# Patient Record
Sex: FEMALE | Race: WHITE | NOT HISPANIC OR LATINO | Employment: FULL TIME | URBAN - METROPOLITAN AREA
[De-identification: names, ages, dates, MRNs, and addresses within clinical notes are randomized per-mention and may not be internally consistent; named-entity substitution may affect disease eponyms.]

---

## 2017-02-02 ENCOUNTER — TELEPHONE (OUTPATIENT)
Dept: OBGYN | Facility: CLINIC | Age: 32
End: 2017-02-02

## 2017-02-02 NOTE — TELEPHONE ENCOUNTER
[facility] Call Center    Phone Message    Name of Caller: Clarissa    Phone Number: Work number on file:  [phone] (work)    Best time to return call: any    May a detailed message be left on voicemail: yes    Relation to patient: Self    Reason for Call: Form or Letter   Type or form/letter needing completion: Referral for Infertility management needs to be faxed to the patients insurance [employer]   Provider: Dr. Brush   Date form needed: ASAP  Once completed: Fax form to: [phone]      Action Taken: Message routed to:  [facility] p [phone]

## 2017-04-21 ENCOUNTER — TELEPHONE (OUTPATIENT)
Dept: OBGYN | Facility: CLINIC | Age: 32
End: 2017-04-21

## 2017-04-21 ENCOUNTER — TRANSFERRED RECORDS (OUTPATIENT)
Dept: HEALTH INFORMATION MANAGEMENT | Facility: CLINIC | Age: 32
End: 2017-04-21

## 2017-04-21 NOTE — TELEPHONE ENCOUNTER
[facility] Call Center    Phone Message    Name of Caller: Clarissa    Phone Number: Cell number on file:    Telephone Information:   Mobile [phone]       Best time to return call: any    May a detailed message be left on voicemail: yes    Relation to patient: Self    Reason for Call: Other: Patient is calling requesting her Infertility management referral be sent to [facility] in [place]. Please advise.     Action Taken: Message routed to:  [facility] p [phone]

## 2017-04-24 NOTE — TELEPHONE ENCOUNTER
Patient is requesting a referral to [facility]. Told patient a referral was faxed to insurance company on 2/2/17. She stated she will contact her insurance to see if it specifically needs to go to [facility] since one was placed.    Also, will come in to clinic and sign a SHOAIB for records.    Gena Kapoor CMA

## 2017-04-24 NOTE — TELEPHONE ENCOUNTER
Gena Kapoor contacted Clarissa on 04/24/17 and left a message. If patient calls back please inform patient that she will need to sign a SHOAIB and then we can release requested lab, office notes, etc to patient or [facility].    Gena Kapoor, SONY

## 2017-05-10 ENCOUNTER — OFFICE VISIT (OUTPATIENT)
Dept: SURGERY | Facility: CLINIC | Age: 32
End: 2017-05-10
Payer: COMMERCIAL

## 2017-05-10 VITALS
RESPIRATION RATE: 18 BRPM | HEART RATE: 74 BPM | BODY MASS INDEX: 45.99 KG/M2 | WEIGHT: 293 LBS | HEIGHT: 67 IN | SYSTOLIC BLOOD PRESSURE: 126 MMHG | OXYGEN SATURATION: 97 % | DIASTOLIC BLOOD PRESSURE: 83 MMHG | TEMPERATURE: 97 F

## 2017-05-10 DIAGNOSIS — R60.0 BILATERAL EDEMA OF LOWER EXTREMITY: ICD-10-CM

## 2017-05-10 DIAGNOSIS — N39.3 STRESS INCONTINENCE: ICD-10-CM

## 2017-05-10 DIAGNOSIS — E66.812 OBESITY, CLASS II, BMI 35-39.9: ICD-10-CM

## 2017-05-10 DIAGNOSIS — Z13.228 SCREENING FOR ENDOCRINE, NUTRITIONAL, METABOLIC AND IMMUNITY DISORDER: ICD-10-CM

## 2017-05-10 DIAGNOSIS — Z13.29 SCREENING FOR ENDOCRINE, NUTRITIONAL, METABOLIC AND IMMUNITY DISORDER: ICD-10-CM

## 2017-05-10 DIAGNOSIS — Z13.21 SCREENING FOR ENDOCRINE, NUTRITIONAL, METABOLIC AND IMMUNITY DISORDER: ICD-10-CM

## 2017-05-10 DIAGNOSIS — Z87.42 HISTORY OF INFERTILITY: ICD-10-CM

## 2017-05-10 DIAGNOSIS — E66.01 MORBID OBESITY WITH BODY MASS INDEX OF 40.0-44.9 IN ADULT (H): Primary | ICD-10-CM

## 2017-05-10 DIAGNOSIS — Z13.0 SCREENING FOR ENDOCRINE, NUTRITIONAL, METABOLIC AND IMMUNITY DISORDER: ICD-10-CM

## 2017-05-10 DIAGNOSIS — Z13.0 SCREENING FOR IRON DEFICIENCY ANEMIA: ICD-10-CM

## 2017-05-10 PROCEDURE — 97802 MEDICAL NUTRITION INDIV IN: CPT | Performed by: DIETITIAN, REGISTERED

## 2017-05-10 PROCEDURE — 99205 OFFICE O/P NEW HI 60 MIN: CPT | Performed by: PHYSICIAN ASSISTANT

## 2017-05-10 NOTE — PROGRESS NOTES
INITIAL BARIATRIC EVALUATION    [address]  [place] [address]  Phone [phone] Fax [phone]    Date: 5/10/2017    RE: OLEGARIO SESAY  MR#: [id number]  : 1985    Dear Kathleen Martin MD,   I had the pleasure of seeing your patient, OLEGARIO SESAY, to evaluate her obesity and consider her for possible weight loss surgery. As you know, OLEGARIO is 31 years old. She has been overweight since puberty. She has been morbidly obese for the last 5 years and has been unable to achieve long-term success with weight loss attempts, such as: Optifast and Weight Watchers  Comorbidities of obesity from her past medical history include: Infertility, Low back pain.  The symptoms related to her obesity include Irregular menstrual cycles, heartburn, Stress Incontinence, Knee pain, Back pain, Swelling of legs, Hip pain, Loud snoring, Shortness of Breath with activity, Varicose veins, Leg swelling. The following ADLs are hindered due to her weight: Lower body dressing.  Non-Obesity Related Past Medical History:  Anxiety - recently restarted medication and is helping  Past Surgical History:  elbow surgery, wisdom teeth extraction  Family History:  Mother- Blood clots,(Mother at age of 55 had 1 DVT believed from oral BCP) pulmonary embolism (PE), DVT;High blood pressure;Obese  PGF- Cancer: Cancer type: prostate  MGF- Diabetes  MGM- Cancer: Cancer type: Pancreatic  Sibling 1- None  Sibling 2- None  Social History:  She is here with [employer].  She works as a pharmacist  Ethnicity: white/  ETOH: Never  Illicit Drug Use: None  Tobacco Use: No    Support system: my fiance, my father will be available to help the patient in the early post op period. my whole family and my fiance and friends is who the patient can count on for support throughout her weight loss journey.  Can you afford three meals per day? Yes  ?Can you afford the $50-60 per month for vitamins? Yes  A 14 point review of system shows:  Female:  Irregular menstrual cycles,  Gastrointestinal: Reflux / heartburn, - occasional tums  Genitourinary: Stress Incontinence,  Musculoskeletal: Back pain, Hip pain, Knee pain, Swelling of legs,  Psychological: Anxiety,  Pulmonary: Shortness of Breath with activity, Snoring,  Skin: Varicose veins , Leg swelling    Vital Signs: Ht: 67 in, Wt: 298.4 lbs., BMI: 46.7, BP: 126/83 , Pulse: 74, RR: 18, Temp: 97.6    PHYSICAL EXAMINATION:  GENERAL: Alert and oriented x3. NAD  HEENT: Normocephalic, atraumatic, EOMI, Oral dentition adequate for chewing  CARDIOVASCULAR: Regular rate and rhythm, No murmurs, rubs or gallops  RESPIRATORY: Lung sounds clear to auscultation bilaterally  GASTROINTESTINAL: Soft, Obese, Nontender  LOWER EXTREMITIES: No edema  MUSCULOSKELETAL: Examination gait was normal  NEUROLOGIC: Alert and oriented x 3, no gross defect  SKIN: dry, warm to touch    In summary, OLEGARIO is morbidly obese with a body mass index of 46.7 kg/m2 and the comorbidities stated above. She attended an informational seminar and is a candidate for Laparoscopic Gastric Sleeve. She will have to complete the following pre-requisites:  Received weight loss goal of 5 lb prior to surgery.  Attend a bariatric support group  Achieve clearance from dietitian to see surgeon.  Establish care with a primary care provider.  Have preoperative laboratory tests drawn.  Psychological Evaluation with MMPI and clearance for weight loss surgery.     Today in the office we discussed gastric sleeve surgery. Preoperative, perioperative, and postoperative processes, management, and follow up were addressed. Risks and benefits were outlined including the risk of death, PE, DVT, ulcer, worsening GERD, N/V, stricture, hernia, wound infection, and vitamin deficiencies. All questions were answered. A goal sheet and support group handout were given to the patient.  Once the patient has completed the requirements set forth in paragraph one, and there are no further  recommendations, she will be allowed to see the surgeon of her choice for consultation on the Laparoscopic Gastric Sleeve surgery. Patient verbalizes understanding of the process to surgery and expectations for the postoperative period including the need for lifelong lifestyle changes, vitamin supplementation, and laboratory monitoring.  Thank you for allowing us to participate in her care.  Sincerely,    Signed by Dallas San MS, DEANDRE  At [facility] we are committed to providing you exceptional care. Our surgeons specialize in performing the following minimally invasive weight-loss surgeries:  Bob-en-Y gastric bypass  Laparoscopic adjustable gastric band  Sleeve gastrectomy    If you would like to review aspects of our weight loss surgery program, please visit our website at [email or web address]. If you have any questions, please do not hesitate to contact us at [phone].

## 2017-05-10 NOTE — MR AVS SNAPSHOT
MRN:[id number]                      After Visit Summary   5/10/2017    Clarissa Paulino    MRN: [id number]           Visit Information        Provider Department      5/10/2017 1:00 PM 1, Hannah Pina, FER [facility] BARIATRICS      Your next 10 appointments already scheduled     Jun 08, 2017  1:30 PM CDT   Weight Loss Visit with Sh Wl Diet 3, RD   [facility] ([facility])    [address] [place] [id number]   [phone]            Jun 16, 2017 12:00 PM CDT   New Visit with Lizzette Marx, PhD   [facility] ([facility])    [address] [place] [id number] [phone]            Jun 23, 2017  9:00 AM CDT   Return Visit with Lizzette Marx, PhD   [facility] ([facility])    [address] [place] [id number] [phone]            Jul 14, 2017  9:00 AM CDT   Return Visit with Lizzette Marx, PhD   [facility] ([facility])    [address] [place] [id number] [phone]              MyChart Information     [facility] gives you secure access to your electronic health record. If you see a primary care provider, you can also send messages to your care team and make appointments. If you have questions, please call your primary care clinic.  If you do not have a primary care provider, please call [phone] and they will assist you.        Care EveryWhere ID     This is your Care EveryWhere ID. This could be used by other organizations to access your [facility] records  [id number]

## 2017-05-10 NOTE — PROGRESS NOTES
Name: OLEGARIO SESAY  : 1985  Gender: Female  MRN: [id number]  Age: 31  INITIAL BARIATRIC NUTRITION ASSESSMENT  REASON FOR VISIT:  OLEGARIO SESAY is a 31 year old Female presents today for initial nutrition visit for bariatric surgery. She was accompanied by her Nick Fournier (for part of the visit)  DIAGNOSIS:  Morbid Obesity.  ANTHROPOMETRICS:  Height: 67 inches  Weight: 298.4 lbs  BMI: 46.7 kg/m2  CURRENT SUPPLEMENTS:  1 prenatal vitamin  WEIGHT LOSS ATTEMPTS:  Optifast, Weight Watchers  NUTRITION HISTORY:  Meals per day: 3  Snacking: Yes  Breakfast: protein shake-within 1 hour of waking  Lunch: Salad from [employer] or [employer]   Supper: some sort of protein and usually a side dish  Snacks: string cheese, turkey beef sticks  Drinks: at least 8 cups of water, Crystal Light  Emotional eating: Yes-related to stress (moving, infertility)  Binge eating: No  Night eating: No  Can you afford three meals per day? Yes  Can you afford the $50-60 per month for vitamins? Yes  Comments: patient works as a pharmacist (day shift) for the [employer] ; glynn does most of the cooking (works as a ); seeking weight loss surgery to help with infertility; patient is moving (locally) in the middle of    DINING OUT HISTORY:  Frequency per week: Around once a week  Location: sit-down restaurants  Type of food: burger, burrito bowl, chicken entree with potatoes  PHYSICAL ACTIVITY:  Type: Other;Walking  Frequency: 3-4x/week  Duration (min): 40  NUTRITION DIAGNOSIS:  Patient with excessive oral food/beverage intake related to intake of calorically dense food/beverages at meals and/or snacks as evidenced by BMI of 46.7  INTERVENTION:  Nutrition Prescription: Recommend nutrient/ energy modification   Goals:  Practice alternatives to emotional eating  Start: 9376-3114 mg calcium (2-3 separate does), 2000 International Units vitamin D  Avoid all pop  Implementation:  Provided written guidelines for  Laparoscopic Gastric Sleeve surgery.  Provided weight loss suggestions.  Explained diet changes that would occur after surgery.  Emphasized importance of diet and lifestyle modifications.  Discussed necessity for chewable multivitamin/ mineral supplements, calcium with vitamin D, vitamin B-12, vitamin D, Iron and vitamin C supplements.  NUTRITION MONITORING AND EVALUATION:  Verbalizes understanding of surgery diet guidelines.  Anticipated compliance: good    FOLLOW UP:  Recommend 2 to 3 follow up visits to assist with lifestyle changes or per insurance requirements.  RD name and number provided.  TIME SPENT WITH PATIENT: 50 minutes  Tod Quiñones RD, LD  [facility]  [phone]

## 2017-05-16 ENCOUNTER — OFFICE VISIT (OUTPATIENT)
Dept: FAMILY MEDICINE | Facility: CLINIC | Age: 32
End: 2017-05-16
Payer: COMMERCIAL

## 2017-05-16 VITALS
WEIGHT: 293 LBS | DIASTOLIC BLOOD PRESSURE: 82 MMHG | HEIGHT: 67 IN | BODY MASS INDEX: 45.99 KG/M2 | SYSTOLIC BLOOD PRESSURE: 125 MMHG | TEMPERATURE: 96.9 F | HEART RATE: 75 BPM | OXYGEN SATURATION: 98 %

## 2017-05-16 DIAGNOSIS — Z13.29 SCREENING FOR ENDOCRINE, NUTRITIONAL, METABOLIC AND IMMUNITY DISORDER: ICD-10-CM

## 2017-05-16 DIAGNOSIS — E66.01 MORBID OBESITY WITH BODY MASS INDEX OF 40.0-44.9 IN ADULT (H): ICD-10-CM

## 2017-05-16 DIAGNOSIS — Z13.228 SCREENING FOR ENDOCRINE, NUTRITIONAL, METABOLIC AND IMMUNITY DISORDER: ICD-10-CM

## 2017-05-16 DIAGNOSIS — F41.1 GENERALIZED ANXIETY DISORDER: Primary | ICD-10-CM

## 2017-05-16 DIAGNOSIS — Z13.21 SCREENING FOR ENDOCRINE, NUTRITIONAL, METABOLIC AND IMMUNITY DISORDER: ICD-10-CM

## 2017-05-16 DIAGNOSIS — Z13.0 SCREENING FOR IRON DEFICIENCY ANEMIA: ICD-10-CM

## 2017-05-16 DIAGNOSIS — Z00.00 ROUTINE HISTORY AND PHYSICAL EXAMINATION OF ADULT: ICD-10-CM

## 2017-05-16 DIAGNOSIS — Z13.0 SCREENING FOR ENDOCRINE, NUTRITIONAL, METABOLIC AND IMMUNITY DISORDER: ICD-10-CM

## 2017-05-16 LAB
ALBUMIN SERPL-MCNC: 3.5 G/DL (ref 3.4–5)
ALP SERPL-CCNC: 80 U/L (ref 40–150)
ALT SERPL W P-5'-P-CCNC: 37 U/L (ref 0–50)
ANION GAP SERPL CALCULATED.3IONS-SCNC: 6 MMOL/L (ref 3–14)
AST SERPL W P-5'-P-CCNC: 31 U/L (ref 0–45)
BILIRUB SERPL-MCNC: 0.9 MG/DL (ref 0.2–1.3)
BUN SERPL-MCNC: 10 MG/DL (ref 7–30)
CALCIUM SERPL-MCNC: 9 MG/DL (ref 8.5–10.1)
CHLORIDE SERPL-SCNC: 102 MMOL/L (ref 94–109)
CO2 SERPL-SCNC: 30 MMOL/L (ref 20–32)
CREAT SERPL-MCNC: 0.74 MG/DL (ref 0.52–1.04)
DEPRECATED CALCIDIOL+CALCIFEROL SERPL-MC: 38 UG/L (ref 20–75)
ERYTHROCYTE [DISTWIDTH] IN BLOOD BY AUTOMATED COUNT: 11.7 % (ref 10–15)
GFR SERPL CREATININE-BSD FRML MDRD: NORMAL ML/MIN/1.7M2
GLUCOSE SERPL-MCNC: 98 MG/DL (ref 70–99)
HBA1C MFR BLD: 5.1 % (ref 4.3–6)
HCT VFR BLD AUTO: 41.2 % (ref 35–47)
HGB BLD-MCNC: 14.6 G/DL (ref 11.7–15.7)
MCH RBC QN AUTO: 32.4 PG (ref 26.5–33)
MCHC RBC AUTO-ENTMCNC: 35.4 G/DL (ref 31.5–36.5)
MCV RBC AUTO: 92 FL (ref 78–100)
PLATELET # BLD AUTO: 243 10E9/L (ref 150–450)
POTASSIUM SERPL-SCNC: 4.2 MMOL/L (ref 3.4–5.3)
PROT SERPL-MCNC: 7.6 G/DL (ref 6.8–8.8)
RBC # BLD AUTO: 4.5 10E12/L (ref 3.8–5.2)
SODIUM SERPL-SCNC: 138 MMOL/L (ref 133–144)
WBC # BLD AUTO: 7.9 10E9/L (ref 4–11)

## 2017-05-16 PROCEDURE — 83036 HEMOGLOBIN GLYCOSYLATED A1C: CPT | Performed by: PHYSICIAN ASSISTANT

## 2017-05-16 PROCEDURE — 82306 VITAMIN D 25 HYDROXY: CPT | Performed by: PHYSICIAN ASSISTANT

## 2017-05-16 PROCEDURE — 85027 COMPLETE CBC AUTOMATED: CPT | Performed by: PHYSICIAN ASSISTANT

## 2017-05-16 PROCEDURE — 99395 PREV VISIT EST AGE 18-39: CPT | Performed by: PREVENTIVE MEDICINE

## 2017-05-16 PROCEDURE — 36415 COLL VENOUS BLD VENIPUNCTURE: CPT | Performed by: PHYSICIAN ASSISTANT

## 2017-05-16 PROCEDURE — 80053 COMPREHEN METABOLIC PANEL: CPT | Performed by: PHYSICIAN ASSISTANT

## 2017-05-16 RX ORDER — ESCITALOPRAM OXALATE 10 MG/1
10 TABLET ORAL DAILY
Qty: 90 TABLET | Refills: 1 | Status: SHIPPED | OUTPATIENT
Start: 2017-05-16 | End: 2017-07-06

## 2017-05-16 ASSESSMENT — PAIN SCALES - GENERAL: PAINLEVEL: NO PAIN (0)

## 2017-05-16 NOTE — NURSING NOTE
"   Chief Complaint   Patient presents with     Physical       Initial /82  Pulse 75  Temp 96.9  F (36.1  C) (Oral)  Ht 5' 7\" (1.702 m)  Wt 298 lb (135.2 kg)  LMP 05/10/2017  SpO2 98%  Breastfeeding? No  BMI 46.67 kg/m2 Estimated body mass index is 46.67 kg/(m^2) as calculated from the following:    Height as of this encounter: 5' 7\" (1.702 m).    Weight as of this encounter: 298 lb (135.2 kg).  Medication Reconciliation: complete   Sylvie MAZARIEGOS                "

## 2017-05-16 NOTE — PATIENT INSTRUCTIONS
At [facility], we strive to deliver an exceptional experience to you, every time we see you.    If you receive a survey in the mail, please send us back your thoughts. We really do value your feedback.    Thank you for visiting [facility]    Normal or non-critical lab and imaging results will be communicated to you by MyChart, letter or phone within 7 days.  If you do not hear from us within 10 days, please call the clinic. If you have a critical or abnormal lab result, we will notify you by phone as soon as possible.     If you have any questions regarding your visit please contact:     Team [facility]  Clinic Hours Telephone Number   Dr. Zafar Lange   7am-7pm  Monday through Thursday  7am-5pm Friday [phone]  Nona SANDHU RN   Pharmacy 8:30am-9pm Monday-Friday    9am-5pm Saturday-Sunday [phone]   Urgent Care 11am-9pm Monday-Friday        9am-5pm Saturday-Sunday [phone]     After hours, weekend or if you need to make an appointment with your primary provider please call [phone].   After Hours nurse advise: call [facility]: [phone]    Use [facility] (secure email communication and access to your chart) to send your primary care provider a message or make an appointment. Ask someone on your Team how to sign up for [employer]. To log on to [employer] or for more information in [employer] please visit the website at [email or web address].   As of October 8, 2013, all password changes, disabled accounts, or ID changes in [facility]/MyHealth will be done by our Access Services Department.   If you need help with your account or password, call: [phone]. Clinic staff no longer has the ability to change passwords.     Preventive Health Recommendations  Female Ages 26 - 39  Yearly exam:   See your health care provider every year in order  to    Review health changes.     Discuss preventive care.      Review your medicines if you your doctor has prescribed any.    Until age 30: Get a Pap test every three years (more often if you have had an abnormal result).    After age 30: Talk to your doctor about whether you should have a Pap test every 3 years or have a Pap test with HPV screening every 5 years.   You do not need a Pap test if your uterus was removed (hysterectomy) and you have not had cancer.  You should be tested each year for STDs (sexually transmitted diseases), if you're at risk.   Talk to your provider about how often to have your cholesterol checked.  If you are at risk for diabetes, you should have a diabetes test (fasting glucose).  Shots: Get a flu shot each year. Get a tetanus shot every 10 years.   Nutrition:     Eat at least 5 servings of fruits and vegetables each day.    Eat whole-grain bread, whole-wheat pasta and brown rice instead of white grains and rice.    Talk to your provider about Calcium and Vitamin D.     Lifestyle    Exercise at least 150 minutes a week (30 minutes a day, 5 days of the week). This will help you control your weight and prevent disease.    Limit alcohol to one drink per day.    No smoking.     Wear sunscreen to prevent skin cancer.    See your dentist every six months for an exam and cleaning.

## 2017-05-16 NOTE — PROGRESS NOTES
SUBJECTIVE:     CC: Clarissa Paulino is an 31 year old woman who presents for preventive health visit.     Healthy Habits:    Do you get at least three servings of calcium containing foods daily (dairy, green leafy vegetables, etc.)? No, 2 servings    Amount of exercise or daily activities, outside of work: 3-4 day(s) per week    Problems taking medications regularly No    Medication side effects: No    Have you had an eye exam in the past two years? no    Do you see a dentist twice per year? yes    Do you have sleep apnea, excessive snoring or daytime drowsiness?no        Anxiety Follow-Up    Status since last visit: No change    Other associated symptoms:None    Complicating factors:   Significant life event: No   Current substance abuse: None  Depression symptoms: No  No flowsheet data found.     GAD7                     Today's PHQ-2 Score:   PHQ-2 ( 1999 Pfizer) 5/16/2017 3/28/2016   Q1: Little interest or pleasure in doing things 0 0   Q2: Feeling down, depressed or hopeless 0 0   PHQ-2 Score 0 0   Little interest or pleasure in doing things - -   Feeling down, depressed or hopeless - -   PHQ-2 Score - -       Abuse: Current or Past(Physical, Sexual or Emotional)- No  Do you feel safe in your environment - Yes    Social History   Substance Use Topics     Smoking status: Never Smoker     Smokeless tobacco: Never Used     Alcohol use No     The patient does not drink >3 drinks per day nor >7 drinks per week.    Recent Labs   Lab Test  03/13/15   1244   CHOL  190   HDL  62   LDL  107   TRIG  105   CHOLHDLRATIO  3.1       Reviewed orders with patient.  Reviewed health maintenance and updated orders accordingly - Yes    Mammo Decision Support:  Mammogram not appropriate for this patient based on age.    Pertinent mammograms are reviewed under the imaging tab.  History of abnormal Pap smear: NO - age 30-65 PAP every 5 years with negative HPV co-testing recommended    Reviewed and updated as needed this visit by  clinical staff  Tobacco  Allergies  Meds         Reviewed and updated as needed this visit by Provider        No past medical history on file.   Past Surgical History:   Procedure Laterality Date     ORTHOPEDIC SURGERY  1991    Left elbow       ROS:  C: NEGATIVE for fever, chills, change in weight  I: NEGATIVE for worrisome rashes, moles or lesions  E: NEGATIVE for vision changes or irritation  ENT: NEGATIVE for ear, mouth and throat problems  R: NEGATIVE for significant cough or SOB  B: NEGATIVE for masses, tenderness or discharge  GI: NEGATIVE for nausea, abdominal pain, heartburn, or change in bowel habits  : NEGATIVE for unusual urinary or vaginal symptoms. Periods are regular.  M: NEGATIVE for significant arthralgias or myalgia  N: NEGATIVE for weakness, dizziness or paresthesias  E: NEGATIVE for temperature intolerance, skin/hair changes  H: NEGATIVE for bleeding problems  P: NEGATIVE for changes in mood or affect    Problem list, Medication list, Allergies, and Medical/Social/Surgical histories reviewed in EPIC and updated as appropriate.  BP Readings from Last 3 Encounters:   05/16/17 125/82   05/10/17 126/83   10/05/16 129/82    Wt Readings from Last 3 Encounters:   05/16/17 298 lb (135.2 kg)   05/10/17 298 lb 6.4 oz (135.4 kg)   10/05/16 264 lb 4.8 oz (119.9 kg)                  Patient Active Problem List   Diagnosis     CARDIOVASCULAR SCREENING; LDL GOAL LESS THAN 160     MONTEZ (generalized anxiety disorder)     Morbid obesity with body mass index of 40.0-44.9 in adult (H)     History of infertility     Stress incontinence     Bilateral edema of lower extremity     Past Surgical History:   Procedure Laterality Date     ORTHOPEDIC SURGERY  1991    Left elbow       Social History   Substance Use Topics     Smoking status: Never Smoker     Smokeless tobacco: Never Used     Alcohol use No     Family History   Problem Relation Age of Onset     Prostate Cancer Paternal Grandfather      Depression/Anxiety  "Sister      Anxiety     Depression/Anxiety Mother      Anxiety     Thyroid Disease Mother      Hypothyroid     Other Cancer Maternal Grandmother      Hodgkins Lymphoma     Anxiety Disorder Brother      Anxiety Disorder Sister      Anxiety Disorder Mother      Thyroid Disease Mother      hypothyroid     Obesity Mother      Obesity Maternal Grandmother          Current Outpatient Prescriptions   Medication Sig Dispense Refill     escitalopram (LEXAPRO) 10 MG tablet Take 1 tablet (10 mg) by mouth daily 90 tablet 1     OMEPRAZOLE PO Take 20 mg by mouth daily       Prenatal Multivit-Min-Fe-FA (PRENATAL VITAMINS PO) Take by mouth daily       [DISCONTINUED] escitalopram (LEXAPRO) 10 MG tablet TAKE 1 TABLET BY MOUTH EVERY DAY 90 tablet 1     Allergies   Allergen Reactions     No Clinical Screening - See Comments Anaphylaxis     Mice     OBJECTIVE:     /82  Pulse 75  Temp 96.9  F (36.1  C) (Oral)  Ht 5' 7\" (1.702 m)  Wt 198 lb (89.8 kg)  LMP 05/10/2017  SpO2 98%  Breastfeeding? No  BMI 31.01 kg/m2  EXAM:  GENERAL: healthy, alert and no distress  EYES: Eyes grossly normal to inspection, PERRL and conjunctivae and sclerae normal  HENT: ear canals and TM's normal, nose and mouth without ulcers or lesions  NECK: no adenopathy, no asymmetry, masses, or scars and thyroid normal to palpation  RESP: lungs clear to auscultation - no rales, rhonchi or wheezes  BREAST: normal without masses, tenderness or nipple discharge and no palpable axillary masses or adenopathy  CV: regular rate and rhythm, normal S1 S2, no S3 or S4, no murmur, click or rub, no peripheral edema and peripheral pulses strong  ABDOMEN: soft, nontender, no hepatosplenomegaly, no masses   MS: no gross musculoskeletal defects noted, Trace bilateral peripheral edema.   SKIN: no suspicious lesions or rashes  NEURO: Normal strength and tone, mentation intact and speech normal  PSYCH: mentation appears normal, affect normal/bright  LYMPH: no cervical, " "supraclavicular, axillary,  adenopathy    ASSESSMENT/PLAN:     Clarissa was seen today for physical.    Diagnoses and all orders for this visit:    Generalized anxiety disorder  -     escitalopram (LEXAPRO) 10 MG tablet; Take 1 tablet (10 mg) by mouth daily  -Refills on medication provided     Routine history and physical examination of adult  -USPSTF guidelines reviewed  -Labs ordered by Bariatric surgery  -Lipid panel normal in 2015     Morbid obesity with body mass index of 40.0-44.9 in adult (H)        COUNSELING:   Reviewed preventive health counseling, as reflected in patient instructions       Regular exercise       Healthy diet/nutrition    BP Screening:   Last 3 BP Readings:    BP Readings from Last 3 Encounters:   05/16/17 125/82   05/10/17 126/83   10/05/16 129/82       The following was recommended to the patient:  Re-screen BP within a year and recommended lifestyle modifications     reports that she has never smoked. She has never used smokeless tobacco.    Estimated body mass index is 31.01 kg/(m^2) as calculated from the following:    Height as of this encounter: 5' 7\" (1.702 m).    Weight as of this encounter: 198 lb (89.8 kg).   Weight management plan: Being followed by Bariatric surgery    Counseling Resources:  ATP IV Guidelines  Pooled Cohorts Equation Calculator  Breast Cancer Risk Calculator  FRAX Risk Assessment  ICSI Preventive Guidelines  Dietary Guidelines for Americans, 2010  USDA's MyPlate  ASA Prophylaxis  Lung CA Screening    Krista Landaverde MD MPH    [facility]  "

## 2017-05-18 ENCOUNTER — TRANSFERRED RECORDS (OUTPATIENT)
Dept: HEALTH INFORMATION MANAGEMENT | Facility: CLINIC | Age: 32
End: 2017-05-18

## 2017-05-18 LAB — PHQ9 SCORE: 0

## 2017-06-03 DIAGNOSIS — F41.1 GENERALIZED ANXIETY DISORDER: ICD-10-CM

## 2017-06-03 NOTE — TELEPHONE ENCOUNTER
escitalopram (LEXAPRO) 10 MG tablet     Last Written Prescription Date: 5/16/17  Last Fill Quantity: 90, # refills: 1  Last Office Visit with [facility] [place] care provider:  5/16/17        Last PHQ-9 score on record= No flowsheet data found.          [facility]

## 2017-06-05 RX ORDER — ESCITALOPRAM OXALATE 10 MG/1
TABLET ORAL
Qty: 90 TABLET | Refills: 0 | OUTPATIENT
Start: 2017-06-05

## 2017-06-05 NOTE — TELEPHONE ENCOUNTER
The following prescription was sent to [place], [place]:    escitalopram (LEXAPRO) 10 MG tablet 90 tablet 1 5/16/2017  No   Sig: Take 1 tablet (10 mg) by mouth daily   Class: E-Prescribe   Notes to Pharmacy: Patient will call when she needs the refills.   Route: Oral   Order: [id number]   E-Prescribing Status: Receipt confirmed by pharmacy (5/16/2017  7:46 AM CDT)     Request denied.  Too soon to fill.    Swapna Doran RN

## 2017-06-08 ENCOUNTER — OFFICE VISIT (OUTPATIENT)
Dept: SURGERY | Facility: CLINIC | Age: 32
End: 2017-06-08
Payer: COMMERCIAL

## 2017-06-08 DIAGNOSIS — E66.01 MORBID OBESITY WITH BODY MASS INDEX OF 40.0-44.9 IN ADULT (H): ICD-10-CM

## 2017-06-08 PROCEDURE — 97803 MED NUTRITION INDIV SUBSEQ: CPT | Performed by: DIETITIAN, REGISTERED

## 2017-06-08 NOTE — MR AVS SNAPSHOT
MRN:[id number]                      After Visit Summary   6/8/2017    Clarissa Paulino    MRN: [id number]           Visit Information        Provider Department      6/8/2017 1:30 PM 3, Hannah Pina RD [facility] [place] [facility]      Your next 10 appointments already scheduled     Jun 21, 2017 10:00 AM CDT   Weight Loss Visit with Sh Wl Diet 1, RD   [facility] [place] ([facility])    [address] [place] [address] [id number] [phone]              MyChart Information     [employer] gives you secure access to your electronic health record. If you see a primary care provider, you can also send messages to your care team and make appointments. If you have questions, please call your primary care clinic.  If you do not have a primary care provider, please call [phone] and they will assist you.        Care EveryWhere ID     This is your Care EveryWhere ID. This could be used by other organizations to access your [facility] medical records  [id number]

## 2017-06-08 NOTE — PROGRESS NOTES
PRE-SURGICAL WEIGHT LOSS NUTRITION APPOINTMENT  DATE OF VISIT: 2017  Name: OLEGARIO SESAY  : 1985  Gender: Female  MRN: [id number]  Age: 31  ASSESSMENT  REASON FOR VISIT:  OLEGARIO SESAY is a 31 year old Female presents today for a pre-surgical weight loss follow-up appointment.  DIAGNOSIS:  Class III Obesity    ANTHROPOMETRICS:  Height: 67 inches  Initial Weight: 298.4 lbs  Weight last visit: 298.4 lbs  Current Weight: 298.2 lbs  BMI: 46.7 kg/m2    NUTRITION HISTORY:  Breakfast: (5:50am wakes, eats at 6:15am) protein shake  Lunch: (12:30-1pm) salads or lean cuisine  Supper: house salad w/chicken, chicken tacos, baked/homemade chicken wings, grilled burgers sometimes   Snacks: (1-2x, usually afternoons and after dinner) banana, string cheese, turkey beef stick   Drinks: at least 8 cups of water, Crystal Light  Consuming liquid calories: protein drink  Eating 3 meals per day: Yes  Eating slower: Yes  Chewing foods thoroughly: Yes  Take 30 minutes to consume each meal: Yes  Fluids and meals separate by at least 30 minutes: Sometimes  Comments: 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 Shares that her fiancée has been very supportive - keeping sweets out of the house, preparing healthy meals and holding pt accountable for bariatric lifestyle habits. Good progress on task list items. Pt states she lost weight however now just started period and believes this is causing weight to appear unchanged today. Ok'd next visit in 2-3 weeks.   Desired Surgical Procedure: Laparoscopic Gastric Sleeve  PHYSICAL ACTIVITY:  Type: Walking;elliptical;Exercise bike  Frequency: 5-6x/week  Duration (min): 40  DIAGNOSIS:  Previous Nutrition  Diagnosis: Obesity related to excess energy intake as evidenced by BMI of 46.7 kg/m2.   Unchanged, modified below  Previous goals:  Practice alternatives to emotional eating - met  Start: 4739-8066 mg calcium (2-3 separate does), 2000 International Units vitamin D - partially met (will start extra vitamin D, only taking 1400 international units  Avoid all pop - met  Current Nutrition Diagnosis: Obesity related to excess energy intake as evidenced by BMI of 46.7kg/m2.   IMPLEMENTATION:  Nutrition Prescription: Recommended energy/nutrient modification  Goals:  Continue to practice  fluids and meals by 30 minutes.  Focus on protein and fruit/vegetables, reduce portions of starch by ~50%.   Implementation:  - Discussed progress towards previous goals  - Reinforced importance of making behavior changes in preparation for bariatric surgery.  NUTRITION MONITORING AND EVALUATION:  Anticipated compliance: Good  Patient verbalized good understanding of bariatric diet guidelines.  Follow up: 1 month  # of visits needed: 1  Cleared by RD: No  TIME SPENT WITH PATIENT: 20 minutes  Iman Alberto RD, LD  Clinical Dietitian

## 2017-06-21 ENCOUNTER — OFFICE VISIT (OUTPATIENT)
Dept: SURGERY | Facility: CLINIC | Age: 32
End: 2017-06-21
Payer: COMMERCIAL

## 2017-06-21 DIAGNOSIS — E66.01 MORBID OBESITY WITH BODY MASS INDEX OF 40.0-44.9 IN ADULT (H): ICD-10-CM

## 2017-06-21 PROCEDURE — 97803 MED NUTRITION INDIV SUBSEQ: CPT | Performed by: DIETITIAN, REGISTERED

## 2017-06-21 NOTE — PROGRESS NOTES
PRE-SURGICAL WEIGHT LOSS NUTRITION APPOINTMENT  DATE OF VISIT: 2017  Name: OLEGARIO SESAY  : 1985  Gender: Female  MRN: [id number]  Age: 31  ASSESSMENT  REASON FOR VISIT:  OLEGARIO SESAY is a 31 year old Female presents today for a pre-surgical weight loss follow-up appointment.  DIAGNOSIS:  Class III, Morbid Obesity.    ANTHROPOMETRICS:  Height: 67 inches  Initial Weight: 298.4 lbs  Weight last visit: 298.2 lbs  Current Weight: 296.1 lbs  BMI: 46.4 kg/m2    NUTRITION HISTORY:  Breakfast: (5:50am wakes, eats at 6:15am) protein shake  Lunch: (12:30-1pm) entrée salads or lean cuisine  Supper: house salad w/chicken, chicken tacos, baked/homemade chicken wings, grilled burgers sometimes   Snacks: (1-2x, usually afternoons and after dinner) banana, string cheese, turkey beef stick   Drinks: at least 8 cups of water, Crystal Light  Consuming liquid calories: no  Eating 3 meals per day: Yes  Eating slower: Yes  Chewing foods thoroughly: Yes  Take 30 minutes to consume each meal: Yes  Fluids and meals separate by at least 30 minutes: Yes  Comments: 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 ; glynn does most of the cooking (works as a ); seeking weight loss surgery to help with infertility; patient is moving (locally) in the middle of 17: pt is confident she can lose 3 more pounds prior to surgery; she is tracking on an kevin and feels that has been very helpful   Desired Surgical Procedure: sleeve gastrectomy  PHYSICAL ACTIVITY:  Type: Walking;elliptical;Exercise bike  Frequency: 5-6x/week  Duration (min): 40  DIAGNOSIS:  Previous Nutrition Diagnosis: Obesity related to excess energy intake as evidenced by BMI of 46.7  no change, modified below  Previous goals:  Continue to practice  fluids and meals by 30 minutes-met  Focus on protein and fruit/vegetables, reduce portions of starch by ~50%-met     Current Nutrition Diagnosis: Obesity related to excess energy  intake as evidenced by BMI of 46.4  IMPLEMENTATION:  Nutrition Prescription: Recommended energy/nutrient modification  Goals:  Continue to practice all prebariatric surgery diet guidelines  Continue to track on kevin (My Fitness Pal) and keep calories at ~ 1500 kcal .day (11 kcal/kg)  Implementation:  - Discussed progress towards previous goals  - Reinforced importance of making behavior changes in preparation for bariatric surgery.  NUTRITION MONITORING AND EVALUATION:  Anticipated compliance: good  Patient verbalized good understanding of bariatric diet guidelines.  Patient has met prebariatric surgery diet requirements.  Follow up:  # of visits needed: 0  Cleared by RD: Yes  TIME SPENT WITH PATIENT: 20 minutes  Tod Quiñones RD, LD  [facility]  [phone]

## 2017-06-21 NOTE — MR AVS SNAPSHOT
MRN:[id number]                      After Visit Summary   6/21/2017    Clarissa Paulino    MRN: [id number]           Visit Information        Provider Department      6/21/2017 10:00 AM 1, Sh Tabatha Pina, FER [facility] [place] [facility]      [facility] Information     [facility] gives you secure access to your electronic health record. If you see a primary care provider, you can also send messages to your care team and make appointments. If you have questions, please call your primary care clinic.  If you do not have a primary care provider, please call [phone] and they will assist you.        Care EveryWhere ID     This is your Care EveryWhere ID. This could be used by other organizations to access your [facility] medical records  [id number]        Equal Access to Services     QIAN SANCHEZ : Alec Broderick, wamanoj phan, topher castellanosalbay roberts, soren shelley. So [facility] [phone].    ATENCIÓN: Si habla español, tiene a chirinos disposición servicios gratuitos de asistencia lingüística. Llame al [phone].    We comply with applicable federal civil rights laws and [place] laws. We do not discriminate on the basis of race, color, national origin, age, disability sex, sexual orientation or gender identity.

## 2017-06-28 ENCOUNTER — OFFICE VISIT (OUTPATIENT)
Dept: SURGERY | Facility: CLINIC | Age: 32
End: 2017-06-28
Payer: COMMERCIAL

## 2017-06-28 ENCOUNTER — MYC MEDICAL ADVICE (OUTPATIENT)
Dept: FAMILY MEDICINE | Facility: CLINIC | Age: 32
End: 2017-06-28

## 2017-06-28 VITALS
BODY MASS INDEX: 45.59 KG/M2 | HEART RATE: 85 BPM | DIASTOLIC BLOOD PRESSURE: 77 MMHG | WEIGHT: 291.1 LBS | SYSTOLIC BLOOD PRESSURE: 126 MMHG

## 2017-06-28 DIAGNOSIS — E66.01 MORBID OBESITY WITH BMI OF 45.0-49.9, ADULT (H): Primary | ICD-10-CM

## 2017-06-28 PROCEDURE — 99215 OFFICE O/P EST HI 40 MIN: CPT | Performed by: SURGERY

## 2017-06-28 NOTE — PROGRESS NOTES
2017  RE: OLEGARIO SESAY  : 1985             Dear Krista Landaverde      I had the distinct pleasure of meeting with your patient, OLEGARIO SESAY, in the [facility].  As you may know, OLEGARIO has been undergoing the thorough preoperative screening process in anticipation of potential bariatric surgery.      At present your patient's BMI is 45.6, and weight is 291  lbs. OLEGARIO suffers from multiple medical comorbidities, some of them related to morbid obesity. The comorbidities include Infertility, Low back pain, Hip pain.      OLEGARIO has tried multiple attempts at weight loss including Optifast, Weight Watchers.      PHYSICAL EXAMINATION:    Vital Signs: Ht: 67 in, Wt: 291  lbs., BMI: 45.6, Pulse: 74,     GENERAL: Alert and oriented x3. NAD  HEENT:  Oral dentition adequate for chewing  CA  RESPIRATORY: Breathing unlabored  GASTROINTESTINAL: Soft, Obese, Nontender, no hernia  LOWER EXTREMITIES: No ankle edema, minimal spider veins  MUSCULOSKELETAL: Examination gait was normal  NEUROLOGIC: Alert and oriented x 3, no gross defect  SKIN: dry, warm to touch      In summary, OLEGARIO SESAY has been evaluated by our medical provider, dietitian and psychologist and appears to be an appropriate candidate for the procedure.      In the office today, I discussed the Laparoscopic Gastric Sleeve as well as the potential risks and benefits. I do feel that some of the related comorbidities can be improved through weight loss, and that surgical weight loss may be the best option. At present we are going to present your patient's file for prior authorization to the insurance provider. Pending prior authorization, I anticipate a surgical date in the near future.      If you have any questions regarding your patient's care, please do not hesitate to contact me. I look forward to keeping you updated on OLEGARIO's progress through our clinic.      Sincerely,      Sloan Burnette,  MD                    Total encopunter time 60 minutes, more than half spent in counseling, review of data and coordination of care

## 2017-06-28 NOTE — PROGRESS NOTES
Mild GERD at times, on once a morning omeprazole for less than a year. Mother had DVT/PE on OCP. Negative work up for clotting disorder. She chose sleeve. Risks reviewed in detail.

## 2017-06-28 NOTE — TELEPHONE ENCOUNTER
Please assist patient in setting up a My Chart or Telephone visit, to address these concerns.  Thanks,  Krista Landaverde MD MPH

## 2017-06-29 ENCOUNTER — E-VISIT (OUTPATIENT)
Dept: FAMILY MEDICINE | Facility: CLINIC | Age: 32
End: 2017-06-29
Payer: COMMERCIAL

## 2017-06-29 DIAGNOSIS — Z30.011 ENCOUNTER FOR INITIAL PRESCRIPTION OF CONTRACEPTIVE PILLS: ICD-10-CM

## 2017-06-29 PROCEDURE — 99444 ZZC PHYSICIAN ONLINE EVALUATION & MANAGEMENT SERVICE: CPT | Performed by: PREVENTIVE MEDICINE

## 2017-06-29 RX ORDER — NORETHINDRONE ACETATE AND ETHINYL ESTRADIOL .02; 1 MG/1; MG/1
1 TABLET ORAL DAILY
Qty: 90 TABLET | Refills: 3 | Status: ON HOLD | OUTPATIENT
Start: 2017-06-29 | End: 2017-08-07

## 2017-07-06 DIAGNOSIS — F41.1 GENERALIZED ANXIETY DISORDER: ICD-10-CM

## 2017-07-07 NOTE — TELEPHONE ENCOUNTER
escitalopram (LEXAPRO) 10 MG tablet     Last Written Prescription Date: 5/16/17  Last Fill Quantity: 90, # refills: 1  Last Office Visit with G primary care provider:  5/16/17        Last PHQ-9 score on record= No flowsheet data found.              Juarez Faaapoloniax  [facility]

## 2017-07-11 RX ORDER — ESCITALOPRAM OXALATE 10 MG/1
TABLET ORAL
Qty: 90 TABLET | Refills: 1 | Status: ON HOLD | OUTPATIENT
Start: 2017-07-11 | End: 2017-08-07

## 2017-07-26 ENCOUNTER — OFFICE VISIT (OUTPATIENT)
Dept: FAMILY MEDICINE | Facility: CLINIC | Age: 32
End: 2017-07-26
Payer: COMMERCIAL

## 2017-07-26 VITALS
WEIGHT: 293 LBS | HEART RATE: 86 BPM | HEIGHT: 67 IN | TEMPERATURE: 98.1 F | DIASTOLIC BLOOD PRESSURE: 78 MMHG | BODY MASS INDEX: 45.99 KG/M2 | SYSTOLIC BLOOD PRESSURE: 116 MMHG | OXYGEN SATURATION: 96 %

## 2017-07-26 DIAGNOSIS — Z01.818 PREOP GENERAL PHYSICAL EXAM: Primary | ICD-10-CM

## 2017-07-26 DIAGNOSIS — E66.01 MORBID OBESITY WITH BODY MASS INDEX OF 40.0-44.9 IN ADULT (H): ICD-10-CM

## 2017-07-26 PROCEDURE — 99214 OFFICE O/P EST MOD 30 MIN: CPT | Performed by: PREVENTIVE MEDICINE

## 2017-07-26 NOTE — NURSING NOTE
"Chief Complaint   Patient presents with     Pre-Op Exam     Pt is fasting.       Initial /78 (BP Location: Left arm, Patient Position: Chair, Cuff Size: Adult Large)  Pulse 86  Temp 98.1  F (36.7  C) (Oral)  Ht 5' 6.53\" (1.69 m)  Wt 296 lb (134.3 kg)  LMP 07/05/2017  SpO2 96%  Breastfeeding? No  BMI 47.01 kg/m2 Estimated body mass index is 47.01 kg/(m^2) as calculated from the following:    Height as of this encounter: 5' 6.53\" (1.69 m).    Weight as of this encounter: 296 lb (134.3 kg).  Medication Reconciliation: complete   An,CMA (AMAA)      "

## 2017-07-26 NOTE — PROGRESS NOTES
Faxed Pre-op notes, no labs, no EKG to [facility],  [phone], right fax confirmed at 1:29 pm today.  Oneida Fuller MA/  For Teams Spirit and Vandana

## 2017-07-26 NOTE — MR AVS SNAPSHOT
After Visit Summary   7/26/2017    Clarissa Paulino    MRN: [id number]           Patient Information     Date Of Birth          1985        Visit Information        Provider Department      7/26/2017 11:00 AM Krista Landaverde MD [facility]        Today's Diagnoses     Preop general physical exam    -  1    Morbid obesity with body mass index of 40.0-44.9 in adult (H)          Care Instructions      Before Your Surgery      Call your surgeon if there is any change in your health. This includes signs of a cold or flu (such as a sore throat, runny nose, cough, rash or fever).    Do not smoke, drink alcohol or take over the counter medicine (unless your surgeon or primary care doctor tells you to) for the 24 hours before and after surgery.    If you take prescribed drugs: Follow your doctor s orders about which medicines to take and which to stop until after surgery.    Eating and drinking prior to surgery: follow the instructions from your surgeon    Take a shower or bath the night before surgery. Use the soap your surgeon gave you to gently clean your skin. If you do not have soap from your surgeon, use your regular soap. Do not shave or scrub the surgery site.  Wear clean pajamas and have clean sheets on your bed.         Based on your medical history and these are the current health maintenance or preventive care services that you are due for (some may have been done at this visit)  There are no preventive care reminders to display for this patient.      At [facility], we strive to deliver an exceptional experience to you, every time we see you.    If you receive a survey in the mail, please send us back your thoughts. We really do value your feedback.    Your care team's suggested websites for health information:  Www.[facility].org : Up to date and easily searchable information on multiple topics.  Www.medlineplus.gov : medication info, interactive  tutorials, watch real surgeries online  Www.familydoctor.org : good info from the Academy of Family Physicians  Www.cdc.gov : public health info, travel advisories, epidemics (H1N1)  Www.aap.org : children's health info, normal development, vaccinations  Www.health.state.[place].us : [place] dept of health, public health issues in [place], N1N1    How to contact your care team:   Team Vandana/Spirit [phone]         Pharmacy [phone]    Dr. Stephen, Stephanie Davis PA-C, Dr. Landaverde, Gabriela ALCALA CNP, Radha Purvis PA-C, Dr. Rocha, and CARI Hicks CNP    Team RNs: Nona & Fany      Clinic hours  M-Th 7 am-7 pm   Fri 7 am-5 pm.   Urgent care M-F 11 am-9 pm,   Sat/Sun 9 am-5 pm.  Pharmacy M-Th 8 am-8 pm Fri 8 am-6 pm  Sat/Sun 9 am-5 pm.     All password changes, disabled accounts, or ID changes in [employer]MyHealth will be done by our Access Services Department.    If you need help with your account or password, call: [phone]. Clinic staff no longer has the ability to change passwords.               Follow-ups after your visit        Your next 10 appointments already scheduled     Aug 07, 2017  7:30 AM CDT   [facility] OR with Sloan Burnette MD   [facility])    [facility])   [phone]            Aug 07, 2017   Procedure with Sloan Burnette MD   [facility] (--)    [address] [place] [address], [address]  [facility] [address] [phone]            Aug 15, 2017  1:00 PM CDT   Post Op with Hannah Mays Rn, RN   [facility] ([facility])    [address]  [address]  [facility] [phone] [phone]            Aug 15, 2017  1:20 PM CDT   Post Op with Dallas San PA-C   [facility] [place] ([facility])    [address]  [place] [address]  [place] [address]   [phone]            Aug 21, 2017 10:00 AM CDT   Post Op with Hannah Mays Rn, RN   [facility] [place] ([facility])    [address] [place], [place] [address]  [place] [address]   [phone]            Aug 21, 2017 10:30 AM CDT   Post Op with Sh Tabatha Diet 1, RD   [facility] [place] ([facility])    [address] [place] [address]  [place] [address]   [phone]            Sep 07, 2017  9:00 AM CDT   Post Op with Sh Tabatha Diet 3, RD   [facility] [place] ([facility])    [address] [place], [place] [address]  [place] [address] [phone]              Who to contact     If you have questions or need follow up information about today's clinic visit or your schedule please contact [facility] directly at [phone].  Normal or non-critical lab and imaging results will be communicated to you by [employer], letter or phone within 4 business days after the clinic has received the results. If you do not hear from us within 7 days, please contact the clinic through [employer] or phone. If you have a critical or abnormal lab result, we will notify you by phone as soon as possible.  Submit refill requests through [employer] or call your pharmacy and they will forward the refill request to us. Please allow 3 business days for your refill to be completed.          Additional Information About Your Visit        [employer] Information     [employer] gives you secure access to your electronic health record. If you see a primary care provider, you can also send messages to your care team and make appointments. If you have questions, please call your primary care clinic.  If you do not have a primary care provider, please call [phone] and they will assist you.        Care EveryWhere ID     This is your Care EveryWhere ID. This  "could be used by other organizations to access your [facility] medical records  [id number]        Your Vitals Were     Pulse Temperature Height Last Period Pulse Oximetry Breastfeeding?    86 98.1  F (36.7  C) (Oral) 5' 6.53\" (1.69 m) 07/05/2017 96% No    BMI (Body Mass Index)                   47.01 kg/m2            Blood Pressure from Last 3 Encounters:   07/26/17 116/78   06/28/17 126/77   05/16/17 125/82    Weight from Last 3 Encounters:   07/26/17 296 lb (134.3 kg)   06/28/17 291 lb 1.6 oz (132 kg)   05/16/17 298 lb (135.2 kg)              Today, you had the following     No orders found for display       Primary Care Provider Office Phone # Fax #    Krista Landaverde [phone]       [facility] [address]  [facility] [address]        Equal Access to Services     [facility] : Hadii aad ku hadasho Soomaali, waaxda luqadaha, qaybta kaalmada adeegyada, waxay idiin hayaan paulineeg nicolásaraisabela la'madina . So [facility] [phone].    ATENCIÓN: Si habla español, tiene a chirinos disposición servicios gratuitos de asistencia lingüística. Llame al [phone].    We comply with applicable federal civil rights laws and [place] laws. We do not discriminate on the basis of race, color, national origin, age, disability sex, sexual orientation or gender identity.            Thank you!     Thank you for choosing [facility]  for your care. Our goal is always to provide you with excellent care. Hearing back from our patients is one way we can continue to improve our services. Please take a few minutes to complete the written survey that you may receive in the mail after your visit with us. Thank you!             Your Updated Medication List - Protect others around you: Learn how to safely use, store and throw away your medicines at www.disposemymeds.org.          This list is accurate as of: 7/26/17 12:10 PM.  Always use your most recent med list.                   Brand Name Dispense " Instructions for use Diagnosis    calcium citrate-vitamin D 315-250 MG-UNIT Tabs per tablet    CITRACAL     Take by mouth daily        escitalopram 10 MG tablet    LEXAPRO    90 tablet    TAKE 1 TABLET BY MOUTH EVERY DAY    Generalized anxiety disorder       norethindrone-ethinyl estradiol 1-20 MG-MCG per tablet    MICROGESTIN 1/20    90 tablet    Take 1 tablet by mouth daily    Encounter for initial prescription of contraceptive pills       OMEPRAZOLE PO      Take 20 mg by mouth daily        PRENATAL VITAMINS PO      Take by mouth daily        VITAMIN D (CHOLECALCIFEROL) PO      Take 2,000 Units by mouth daily

## 2017-07-26 NOTE — PROGRESS NOTES
[address] [address]  [phone]  Dept: [phone]    PRE-OP EVALUATION:  Today's date: 2017    Clarissa Paulino (: 1985) presents for pre-operative evaluation assessment as requested by Sloan Bustamante MD.  She requires evaluation and anesthesia risk assessment prior to undergoing surgery/procedure for treatment of COMBINED LAPAROSCOPIC GASTRIC SLEEVE, CHOLECYSTECTOMY .  Proposed procedure: LAPAROSCOPIC GASTRIC SLEEVE, POSSIBLE LAPAROSCOPIC CHOLECYSTECTOMY    Date of Surgery/ Procedure: 2017  Time of Surgery/ Procedure: 7:30 am  Hospital/Surgical Facility: [facility]  Fax number for surgical facility: [phone]  Primary Physician: Krista Landaverde  Type of Anesthesia Anticipated: General     Patient has a Health Care Directive or Living Will:  NO    1. NO - Do you have a history of heart attack, stroke, stent, bypass or surgery on an artery in the head, neck, heart or legs?  2. NO - Do you ever have any pain or discomfort in your chest?  3. NO - Do you have a history of  Heart Failure?  4. NO - Are you troubled by shortness of breath when: walking on the level, up a slight hill or at night?  5. NO - Do you currently have a cold, bronchitis or other respiratory infection?  6. NO - Do you have a cough, shortness of breath or wheezing?  7. NO - Do you sometimes get pains in the calves of your legs when you walk?  8. YES - Do you or anyone in your family have previous history of blood clots? Mother with DVT and PE in her 50s, hypercoagulation work up was negative, believed to be due to exogenous hormones   9. NO - Do you or does anyone in your family have a serious bleeding problem such as prolonged bleeding following surgeries or cuts?  10. NO - Have you ever had problems with anemia or been told to take iron pills?  11. NO - Have you had any abnormal blood loss such as black, tarry or bloody stools, or  abnormal vaginal bleeding?  12. NO - Have you ever had a blood transfusion?  13. NO - Have you or any of your relatives ever had problems with anesthesia?  14. NO - Do you have sleep apnea, excessive snoring or daytime drowsiness?  15. NO - Do you have any prosthetic heart valves?  16. NO - Do you have prosthetic joints?  17. NO - Is there any chance that you may be pregnant?        HPI:                                                      Brief HPI related to upcoming procedure: Scheduled for elective weight loss surgery       See problem list for active medical problems.  Problems all longstanding and stable, except as noted/documented.  See ROS for pertinent symptoms related to these conditions.                                                                                                  .    MEDICAL HISTORY:                                                    Patient Active Problem List    Diagnosis Date Noted     Morbid obesity with body mass index of 40.0-44.9 in adult (H) 05/10/2017     Priority: Medium     History of infertility 05/10/2017     Priority: Medium     Stress incontinence 05/10/2017     Priority: Medium     Bilateral edema of lower extremity 05/10/2017     Priority: Medium     MONTEZ (generalized anxiety disorder) 03/28/2016     Priority: Medium     CARDIOVASCULAR SCREENING; LDL GOAL LESS THAN 160 10/31/2010     Priority: Medium      History reviewed. No pertinent past medical history.  Past Surgical History:   Procedure Laterality Date     ORTHOPEDIC SURGERY  1991    Left elbow     Current Outpatient Prescriptions   Medication Sig Dispense Refill     calcium citrate-vitamin D (CITRACAL) 315-250 MG-UNIT TABS per tablet Take by mouth daily       escitalopram (LEXAPRO) 10 MG tablet TAKE 1 TABLET BY MOUTH EVERY DAY 90 tablet 1     norethindrone-ethinyl estradiol (MICROGESTIN 1/20) 1-20 MG-MCG per tablet Take 1 tablet by mouth daily 90 tablet 3     VITAMIN D, CHOLECALCIFEROL, PO Take 2,000 Units by  "mouth daily       OMEPRAZOLE PO Take 20 mg by mouth daily       Prenatal Multivit-Min-Fe-FA (PRENATAL VITAMINS PO) Take by mouth daily       OTC products: None, except as noted above    Allergies   Allergen Reactions     No Clinical Screening - See Comments Anaphylaxis     Mice      Latex Allergy: NO    Social History   Substance Use Topics     Smoking status: Never Smoker     Smokeless tobacco: Never Used     Alcohol use No     History   Drug Use No       REVIEW OF SYSTEMS:                                                    Constitutional, neuro, ENT, endocrine, pulmonary, cardiac, gastrointestinal, genitourinary, musculoskeletal, integument and psychiatric systems are negative, except as otherwise noted.      EXAM:                                                    /78 (BP Location: Left arm, Patient Position: Chair, Cuff Size: Adult Large)  Pulse 86  Temp 98.1  F (36.7  C) (Oral)  Ht 5' 6.53\" (1.69 m)  Wt 296 lb (134.3 kg)  LMP 07/05/2017  SpO2 96%  Breastfeeding? No  BMI 47.01 kg/m2    GENERAL APPEARANCE: healthy, alert and no distress     EYES: EOMI, PERRL     HENT: ear canals and TM's normal and nose and mouth without ulcers or lesions     NECK: no adenopathy, no asymmetry, masses, or scars and thyroid normal to palpation     RESP: lungs clear to auscultation - no rales, rhonchi or wheezes     CV: regular rates and rhythm, normal S1 S2, no S3 or S4 and no murmur, click or rub     ABDOMEN:  soft, nontender, no HSM or masses      MS: extremities normal- no gross deformities noted, no evidence of inflammation in joints, FROM in all extremities.     SKIN: no suspicious lesions or rashes     NEURO: Normal strength and tone, sensory exam grossly normal, mentation intact and speech normal     PSYCH: mentation appears normal. and affect normal/bright     LYMPHATICS: No cervical, or supraclavicular nodes    DIAGNOSTICS:                                                      EKG: Not indicated due to " non-vascular surgery and low risk of event (age <65 and without cardiac risk factors)  Labs Resulted Today:   Results for orders placed or performed in visit on 05/18/17   PHQ-9 DEPRESSION SCREENING ORDER   Result Value Ref Range    PHQ9 SCORE 0     Narrative    [facility]AL[facility] 5/18/17.       Recent Labs   Lab Test  05/16/17   0801  06/28/16   1603   HGB  14.6  13.9   PLT  243  227   NA  138  137   POTASSIUM  4.2  3.6   CR  0.74  0.83   A1C  5.1   --         IMPRESSION:                                                    Reason for surgery/procedure: Morbid Obesity/laparascopic gastric lsleeve  Diagnosis/reason for consult: Pre operative evaluation     The proposed surgical procedure is considered INTERMEDIATE risk.    REVISED CARDIAC RISK INDEX  The patient has the following serious cardiovascular risks for perioperative complications such as (MI, PE, VFib and 3  AV Block):  No serious cardiac risks  INTERPRETATION: 0 risks: Class I (very low risk - 0.4% complication rate)    Caprini score 3     The patient has the following additional risks for perioperative complications:  No identified additional risks  The ASCVD Risk score (Dianne DC Jr, et al., 2013) failed to calculate for the following reasons:    The 2013 ASCVD risk score is only valid for ages 40 to 79    Encounter Diagnoses   Name Primary?     Preop general physical exam Yes     Morbid obesity with body mass index of 40.0-44.9 in adult (H)          RECOMMENDATIONS:                                                        Cardiovascular Risk  Performs 4 METs exercise without symptoms (Light housework (dusting, washing dishes) and Climb a flight of stairs) .         --Patient is to take all scheduled medications on the day of surgery EXCEPT for modifications listed below.  --Has not started oral contraceptives at this time, we discussed holding off on starting them to reduce risk of thromboembolic events, till her next  menstrual cycle after surgery. Is using condoms for contraception. Urine pregnancy test to be checked on day of surgery.     APPROVAL GIVEN to proceed with proposed procedure, without further diagnostic evaluation       Signed Electronically by: Krista Landaverde MD MPH    Copy of this evaluation report is provided to requesting physician.    [facility] Preop Guidelines

## 2017-07-26 NOTE — PATIENT INSTRUCTIONS
Before Your Surgery      Call your surgeon if there is any change in your health. This includes signs of a cold or flu (such as a sore throat, runny nose, cough, rash or fever).    Do not smoke, drink alcohol or take over the counter medicine (unless your surgeon or primary care doctor tells you to) for the 24 hours before and after surgery.    If you take prescribed drugs: Follow your doctor s orders about which medicines to take and which to stop until after surgery.    Eating and drinking prior to surgery: follow the instructions from your surgeon    Take a shower or bath the night before surgery. Use the soap your surgeon gave you to gently clean your skin. If you do not have soap from your surgeon, use your regular soap. Do not shave or scrub the surgery site.  Wear clean pajamas and have clean sheets on your bed.         Based on your medical history and these are the current health maintenance or preventive care services that you are due for (some may have been done at this visit)  There are no preventive care reminders to display for this patient.      At [facility], we strive to deliver an exceptional experience to you, every time we see you.    If you receive a survey in the mail, please send us back your thoughts. We really do value your feedback.    Your care team's suggested websites for health information:  Www.[place].org : Up to date and easily searchable information on multiple topics.  Www.medlineplus.gov : medication info, interactive tutorials, watch real surgeries online  Www.familydoctor.org : good info from the Academy of Family Physicians  Www.cdc.gov : public health info, travel advisories, epidemics (H1N1)  Www.aap.org : children's health info, normal development, vaccinations  Www.health.state.[place].us : [place] dept of health, public health issues in [place], N1N1    How to contact your care team:   Team [facility] [phone]         Pharmacy [phone]      Zafar, Stephanie Davis PA-C, Dr. Landaverde, Gabriela ALCALA CNP, Radha Purvis PA-C, Dr. Rocha, and CARI Hicks CNP    Team RNs: Nona Soares      Clinic hours  M-Th 7 am-7 pm   Fri 7 am-5 pm.   Urgent care M-F 11 am-9 pm,   Sat/Sun 9 am-5 pm.  Pharmacy M-Th 8 am-8 pm Fri 8 am-6 pm  Sat/Sun 9 am-5 pm.     All password changes, disabled accounts, or ID changes in [facility] will be done by our Access Services Department.    If you need help with your account or password, call: [phone]. Clinic staff no longer has the ability to change passwords.

## 2017-07-31 ENCOUNTER — TELEPHONE (OUTPATIENT)
Dept: SURGERY | Facility: CLINIC | Age: 32
End: 2017-07-31

## 2017-07-31 NOTE — TELEPHONE ENCOUNTER
LM for patient to call clinic.  When she returns call, please inform her:    Her weight was 296# at preop on 7/26/17.  She was 299.1# at preop class on 7/6/17.  Her goal is 293.4#.  She will need to be seen in clinic this week for weigh in and needs to be within 2# of goal.  Leatha Bradford, MS, RD, RN

## 2017-08-01 NOTE — TELEPHONE ENCOUNTER
Patient called.  Stated she can get in for a weight check with a nurse in her clinic at 0720 Friday 8/4/17.  Informed her that this was okay and we would look for her weight to be posted early Friday morning.  Leatha Bradford, MS, RD, RN

## 2017-08-01 NOTE — TELEPHONE ENCOUNTER
Patient returned call this morning.  Informed her re: weight goal as listed below.  She stated she will go into [facility] clinic closest to home to have weight rechecked on Thursday 8/3/17.  She is aware she needs to be at 295# when weighed on Thursday.  Leatha Bradford, MS, RD, RN

## 2017-08-03 NOTE — H&P (VIEW-ONLY)
[address] [address]  [phone]  Dept: [phone]    PRE-OP EVALUATION:  Today's date: 2017    Clarissa Paulino (: 1985) presents for pre-operative evaluation assessment as requested by Sloan Bustamante MD.  She requires evaluation and anesthesia risk assessment prior to undergoing surgery/procedure for treatment of COMBINED LAPAROSCOPIC GASTRIC SLEEVE, CHOLECYSTECTOMY .  Proposed procedure: LAPAROSCOPIC GASTRIC SLEEVE, POSSIBLE LAPAROSCOPIC CHOLECYSTECTOMY    Date of Surgery/ Procedure: 2017  Time of Surgery/ Procedure: 7:30 am  Hospital/Surgical Facility: [facility]  Fax number for surgical facility: [phone]  Primary Physician: Krista Landaverde  Type of Anesthesia Anticipated: General     Patient has a Health Care Directive or Living Will:  NO    1. NO - Do you have a history of heart attack, stroke, stent, bypass or surgery on an artery in the head, neck, heart or legs?  2. NO - Do you ever have any pain or discomfort in your chest?  3. NO - Do you have a history of  Heart Failure?  4. NO - Are you troubled by shortness of breath when: walking on the level, up a slight hill or at night?  5. NO - Do you currently have a cold, bronchitis or other respiratory infection?  6. NO - Do you have a cough, shortness of breath or wheezing?  7. NO - Do you sometimes get pains in the calves of your legs when you walk?  8. YES - Do you or anyone in your family have previous history of blood clots? Mother with DVT and PE in her 50s, hypercoagulation work up was negative, believed to be due to exogenous hormones   9. NO - Do you or does anyone in your family have a serious bleeding problem such as prolonged bleeding following surgeries or cuts?  10. NO - Have you ever had problems with anemia or been told to take iron pills?  11. NO - Have you had any abnormal blood loss such as black, tarry or bloody stools, or  abnormal vaginal bleeding?  12. NO - Have you ever had a blood transfusion?  13. NO - Have you or any of your relatives ever had problems with anesthesia?  14. NO - Do you have sleep apnea, excessive snoring or daytime drowsiness?  15. NO - Do you have any prosthetic heart valves?  16. NO - Do you have prosthetic joints?  17. NO - Is there any chance that you may be pregnant?        HPI:                                                      Brief HPI related to upcoming procedure: Scheduled for elective weight loss surgery       See problem list for active medical problems.  Problems all longstanding and stable, except as noted/documented.  See ROS for pertinent symptoms related to these conditions.                                                                                                  .    MEDICAL HISTORY:                                                    Patient Active Problem List    Diagnosis Date Noted     Morbid obesity with body mass index of 40.0-44.9 in adult (H) 05/10/2017     Priority: Medium     History of infertility 05/10/2017     Priority: Medium     Stress incontinence 05/10/2017     Priority: Medium     Bilateral edema of lower extremity 05/10/2017     Priority: Medium     MONTEZ (generalized anxiety disorder) 03/28/2016     Priority: Medium     CARDIOVASCULAR SCREENING; LDL GOAL LESS THAN 160 10/31/2010     Priority: Medium      History reviewed. No pertinent past medical history.  Past Surgical History:   Procedure Laterality Date     ORTHOPEDIC SURGERY  1991    Left elbow     Current Outpatient Prescriptions   Medication Sig Dispense Refill     calcium citrate-vitamin D (CITRACAL) 315-250 MG-UNIT TABS per tablet Take by mouth daily       escitalopram (LEXAPRO) 10 MG tablet TAKE 1 TABLET BY MOUTH EVERY DAY 90 tablet 1     norethindrone-ethinyl estradiol (MICROGESTIN 1/20) 1-20 MG-MCG per tablet Take 1 tablet by mouth daily 90 tablet 3     VITAMIN D, CHOLECALCIFEROL, PO Take 2,000 Units by  "mouth daily       OMEPRAZOLE PO Take 20 mg by mouth daily       Prenatal Multivit-Min-Fe-FA (PRENATAL VITAMINS PO) Take by mouth daily       OTC products: None, except as noted above    Allergies   Allergen Reactions     No Clinical Screening - See Comments Anaphylaxis     Mice      Latex Allergy: NO    Social History   Substance Use Topics     Smoking status: Never Smoker     Smokeless tobacco: Never Used     Alcohol use No     History   Drug Use No       REVIEW OF SYSTEMS:                                                    Constitutional, neuro, ENT, endocrine, pulmonary, cardiac, gastrointestinal, genitourinary, musculoskeletal, integument and psychiatric systems are negative, except as otherwise noted.      EXAM:                                                    /78 (BP Location: Left arm, Patient Position: Chair, Cuff Size: Adult Large)  Pulse 86  Temp 98.1  F (36.7  C) (Oral)  Ht 5' 6.53\" (1.69 m)  Wt 296 lb (134.3 kg)  LMP 07/05/2017  SpO2 96%  Breastfeeding? No  BMI 47.01 kg/m2    GENERAL APPEARANCE: healthy, alert and no distress     EYES: EOMI, PERRL     HENT: ear canals and TM's normal and nose and mouth without ulcers or lesions     NECK: no adenopathy, no asymmetry, masses, or scars and thyroid normal to palpation     RESP: lungs clear to auscultation - no rales, rhonchi or wheezes     CV: regular rates and rhythm, normal S1 S2, no S3 or S4 and no murmur, click or rub     ABDOMEN:  soft, nontender, no HSM or masses      MS: extremities normal- no gross deformities noted, no evidence of inflammation in joints, FROM in all extremities.     SKIN: no suspicious lesions or rashes     NEURO: Normal strength and tone, sensory exam grossly normal, mentation intact and speech normal     PSYCH: mentation appears normal. and affect normal/bright     LYMPHATICS: No cervical, or supraclavicular nodes    DIAGNOSTICS:                                                      EKG: Not indicated due to " non-vascular surgery and low risk of event (age <65 and without cardiac risk factors)  Labs Resulted Today:   Results for orders placed or performed in visit on 05/18/17   PHQ-9 DEPRESSION SCREENING ORDER   Result Value Ref Range    PHQ9 SCORE 0     Narrative    [facility] EVALUATION 5/18/17.       Recent Labs   Lab Test  05/16/17   0801  06/28/16   1603   HGB  14.6  13.9   PLT  243  227   NA  138  137   POTASSIUM  4.2  3.6   CR  0.74  0.83   A1C  5.1   --         IMPRESSION:                                                    Reason for surgery/procedure: Morbid Obesity/laparascopic gastric lsleeve  Diagnosis/reason for consult: Pre operative evaluation     The proposed surgical procedure is considered INTERMEDIATE risk.    REVISED CARDIAC RISK INDEX  The patient has the following serious cardiovascular risks for perioperative complications such as (MI, PE, VFib and 3  AV Block):  No serious cardiac risks  INTERPRETATION: 0 risks: Class I (very low risk - 0.4% complication rate)    Caprini score 3     The patient has the following additional risks for perioperative complications:  No identified additional risks  The ASCVD Risk score (Dianne DC Jr, et al., 2013) failed to calculate for the following reasons:    The 2013 ASCVD risk score is only valid for ages 40 to 79    Encounter Diagnoses   Name Primary?     Preop general physical exam Yes     Morbid obesity with body mass index of 40.0-44.9 in adult (H)          RECOMMENDATIONS:                                                        Cardiovascular Risk  Performs 4 METs exercise without symptoms (Light housework (dusting, washing dishes) and Climb a flight of stairs) .         --Patient is to take all scheduled medications on the day of surgery EXCEPT for modifications listed below.  --Has not started oral contraceptives at this time, we discussed holding off on starting them to reduce risk of thromboembolic events, till her next  menstrual cycle after surgery. Is using condoms for contraception. Urine pregnancy test to be checked on day of surgery.     APPROVAL GIVEN to proceed with proposed procedure, without further diagnostic evaluation       Signed Electronically by: Krista Landaverde MD MPH    Copy of this evaluation report is provided to requesting physician.    [place] Preop Guidelines

## 2017-08-04 ENCOUNTER — ALLIED HEALTH/NURSE VISIT (OUTPATIENT)
Dept: NURSING | Facility: CLINIC | Age: 32
End: 2017-08-04

## 2017-08-04 VITALS — BODY MASS INDEX: 46.82 KG/M2 | WEIGHT: 293 LBS

## 2017-08-04 DIAGNOSIS — E66.01 MORBID OBESITY WITH BODY MASS INDEX OF 40.0-44.9 IN ADULT (H): Primary | ICD-10-CM

## 2017-08-07 ENCOUNTER — APPOINTMENT (OUTPATIENT)
Dept: SURGERY | Facility: PHYSICIAN GROUP | Age: 32
End: 2017-08-07
Payer: COMMERCIAL

## 2017-08-07 ENCOUNTER — ANESTHESIA EVENT (OUTPATIENT)
Dept: SURGERY | Facility: CLINIC | Age: 32
DRG: 621 | End: 2017-08-07
Payer: COMMERCIAL

## 2017-08-07 ENCOUNTER — HOSPITAL ENCOUNTER (INPATIENT)
Facility: CLINIC | Age: 32
LOS: 2 days | Discharge: HOME | DRG: 621 | End: 2017-08-09
Attending: SURGERY | Admitting: SURGERY
Payer: COMMERCIAL

## 2017-08-07 ENCOUNTER — ANESTHESIA (OUTPATIENT)
Dept: SURGERY | Facility: CLINIC | Age: 32
DRG: 621 | End: 2017-08-07
Payer: COMMERCIAL

## 2017-08-07 ENCOUNTER — SURGERY (OUTPATIENT)
Age: 32
End: 2017-08-07

## 2017-08-07 DIAGNOSIS — E66.01 MORBID OBESITY WITH BODY MASS INDEX OF 40.0-44.9 IN ADULT (H): Primary | ICD-10-CM

## 2017-08-07 LAB
CREAT SERPL-MCNC: 0.6 MG/DL (ref 0.52–1.04)
GFR SERPL CREATININE-BSD FRML MDRD: NORMAL ML/MIN/1.7M2
HCG SERPL QL: NEGATIVE
HGB BLD-MCNC: 14 G/DL (ref 11.7–15.7)
HGB BLD-MCNC: 14.8 G/DL (ref 11.7–15.7)
PLATELET # BLD AUTO: 203 10E9/L (ref 150–450)

## 2017-08-07 PROCEDURE — 88300 SURGICAL PATH GROSS: CPT | Mod: 26 | Performed by: SURGERY

## 2017-08-07 PROCEDURE — 25000566 ZZH SEVOFLURANE, EA 15 MIN: Performed by: SURGERY

## 2017-08-07 PROCEDURE — 71000012 ZZH RECOVERY PHASE 1 LEVEL 1 FIRST HR: Performed by: SURGERY

## 2017-08-07 PROCEDURE — 0DB64Z3 EXCISION OF STOMACH, PERCUTANEOUS ENDOSCOPIC APPROACH, VERTICAL: ICD-10-PCS | Performed by: SURGERY

## 2017-08-07 PROCEDURE — 25000128 H RX IP 250 OP 636: Performed by: SURGERY

## 2017-08-07 PROCEDURE — 37000008 ZZH ANESTHESIA TECHNICAL FEE, 1ST 30 MIN: Performed by: SURGERY

## 2017-08-07 PROCEDURE — 36000063 ZZH SURGERY LEVEL 4 EA 15 ADDTL MIN: Performed by: SURGERY

## 2017-08-07 PROCEDURE — 25000125 ZZHC RX 250: Performed by: ANESTHESIOLOGY

## 2017-08-07 PROCEDURE — 12000007 ZZH R&B INTERMEDIATE

## 2017-08-07 PROCEDURE — 85018 HEMOGLOBIN: CPT | Performed by: ANESTHESIOLOGY

## 2017-08-07 PROCEDURE — 85049 AUTOMATED PLATELET COUNT: CPT | Performed by: PHYSICIAN ASSISTANT

## 2017-08-07 PROCEDURE — 25800025 ZZH RX 258: Performed by: SURGERY

## 2017-08-07 PROCEDURE — 82565 ASSAY OF CREATININE: CPT | Performed by: PHYSICIAN ASSISTANT

## 2017-08-07 PROCEDURE — 40000169 ZZH STATISTIC PRE-PROCEDURE ASSESSMENT I: Performed by: SURGERY

## 2017-08-07 PROCEDURE — 71000013 ZZH RECOVERY PHASE 1 LEVEL 1 EA ADDTL HR: Performed by: SURGERY

## 2017-08-07 PROCEDURE — 84703 CHORIONIC GONADOTROPIN ASSAY: CPT | Performed by: ANESTHESIOLOGY

## 2017-08-07 PROCEDURE — 36415 COLL VENOUS BLD VENIPUNCTURE: CPT | Performed by: PHYSICIAN ASSISTANT

## 2017-08-07 PROCEDURE — 43775 LAP SLEEVE GASTRECTOMY: CPT | Performed by: SURGERY

## 2017-08-07 PROCEDURE — 25000128 H RX IP 250 OP 636: Performed by: ANESTHESIOLOGY

## 2017-08-07 PROCEDURE — 25000125 ZZHC RX 250: Performed by: SURGERY

## 2017-08-07 PROCEDURE — 36000093 ZZH SURGERY LEVEL 4 1ST 30 MIN: Performed by: SURGERY

## 2017-08-07 PROCEDURE — 85018 HEMOGLOBIN: CPT | Performed by: PHYSICIAN ASSISTANT

## 2017-08-07 PROCEDURE — 25000125 ZZHC RX 250: Performed by: NURSE ANESTHETIST, CERTIFIED REGISTERED

## 2017-08-07 PROCEDURE — 27210794 ZZH OR GENERAL SUPPLY STERILE: Performed by: SURGERY

## 2017-08-07 PROCEDURE — 88300 SURGICAL PATH GROSS: CPT | Performed by: SURGERY

## 2017-08-07 PROCEDURE — 25000128 H RX IP 250 OP 636: Performed by: PHYSICIAN ASSISTANT

## 2017-08-07 PROCEDURE — 37000009 ZZH ANESTHESIA TECHNICAL FEE, EACH ADDTL 15 MIN: Performed by: SURGERY

## 2017-08-07 PROCEDURE — 25000128 H RX IP 250 OP 636: Performed by: NURSE ANESTHETIST, CERTIFIED REGISTERED

## 2017-08-07 PROCEDURE — 36415 COLL VENOUS BLD VENIPUNCTURE: CPT | Performed by: ANESTHESIOLOGY

## 2017-08-07 PROCEDURE — 40000275 ZZH STATISTIC RCP TIME EA 10 MIN

## 2017-08-07 RX ORDER — CEFAZOLIN SODIUM 1 G/50ML
3 SOLUTION INTRAVENOUS
Status: COMPLETED | OUTPATIENT
Start: 2017-08-07 | End: 2017-08-07

## 2017-08-07 RX ORDER — ESCITALOPRAM OXALATE 10 MG/1
10 TABLET ORAL DAILY
Status: DISCONTINUED | OUTPATIENT
Start: 2017-08-08 | End: 2017-08-09 | Stop reason: HOSPADM

## 2017-08-07 RX ORDER — NEOSTIGMINE METHYLSULFATE 1 MG/ML
VIAL (ML) INJECTION PRN
Status: DISCONTINUED | OUTPATIENT
Start: 2017-08-07 | End: 2017-08-07

## 2017-08-07 RX ORDER — LIDOCAINE 40 MG/G
CREAM TOPICAL
Status: DISCONTINUED | OUTPATIENT
Start: 2017-08-07 | End: 2017-08-09 | Stop reason: HOSPADM

## 2017-08-07 RX ORDER — NALOXONE HYDROCHLORIDE 0.4 MG/ML
.1-.4 INJECTION, SOLUTION INTRAMUSCULAR; INTRAVENOUS; SUBCUTANEOUS
Status: DISCONTINUED | OUTPATIENT
Start: 2017-08-07 | End: 2017-08-09 | Stop reason: HOSPADM

## 2017-08-07 RX ORDER — KETOROLAC TROMETHAMINE 30 MG/ML
INJECTION, SOLUTION INTRAMUSCULAR; INTRAVENOUS PRN
Status: DISCONTINUED | OUTPATIENT
Start: 2017-08-07 | End: 2017-08-07

## 2017-08-07 RX ORDER — FENTANYL CITRATE 50 UG/ML
INJECTION, SOLUTION INTRAMUSCULAR; INTRAVENOUS PRN
Status: DISCONTINUED | OUTPATIENT
Start: 2017-08-07 | End: 2017-08-07

## 2017-08-07 RX ORDER — OXYCODONE HCL 5 MG/5 ML
5-10 SOLUTION, ORAL ORAL EVERY 4 HOURS PRN
Status: DISCONTINUED | OUTPATIENT
Start: 2017-08-07 | End: 2017-08-09 | Stop reason: HOSPADM

## 2017-08-07 RX ORDER — EPHEDRINE SULFATE 50 MG/ML
INJECTION, SOLUTION INTRAMUSCULAR; INTRAVENOUS; SUBCUTANEOUS PRN
Status: DISCONTINUED | OUTPATIENT
Start: 2017-08-07 | End: 2017-08-07

## 2017-08-07 RX ORDER — PROPOFOL 10 MG/ML
INJECTION, EMULSION INTRAVENOUS PRN
Status: DISCONTINUED | OUTPATIENT
Start: 2017-08-07 | End: 2017-08-07

## 2017-08-07 RX ORDER — LIDOCAINE HYDROCHLORIDE 20 MG/ML
INJECTION, SOLUTION INFILTRATION; PERINEURAL PRN
Status: DISCONTINUED | OUTPATIENT
Start: 2017-08-07 | End: 2017-08-07

## 2017-08-07 RX ORDER — KETOROLAC TROMETHAMINE 30 MG/ML
30 INJECTION, SOLUTION INTRAMUSCULAR; INTRAVENOUS EVERY 6 HOURS
Status: COMPLETED | OUTPATIENT
Start: 2017-08-07 | End: 2017-08-08

## 2017-08-07 RX ORDER — ONDANSETRON 4 MG/1
4 TABLET, ORALLY DISINTEGRATING ORAL EVERY 6 HOURS PRN
Status: DISCONTINUED | OUTPATIENT
Start: 2017-08-07 | End: 2017-08-09 | Stop reason: HOSPADM

## 2017-08-07 RX ORDER — CEFAZOLIN SODIUM 2 G/100ML
2 INJECTION, SOLUTION INTRAVENOUS EVERY 8 HOURS
Status: COMPLETED | OUTPATIENT
Start: 2017-08-07 | End: 2017-08-08

## 2017-08-07 RX ORDER — ONDANSETRON 2 MG/ML
4 INJECTION INTRAMUSCULAR; INTRAVENOUS EVERY 6 HOURS PRN
Status: DISCONTINUED | OUTPATIENT
Start: 2017-08-07 | End: 2017-08-09 | Stop reason: HOSPADM

## 2017-08-07 RX ORDER — DIPHENHYDRAMINE HCL 25 MG
25 CAPSULE ORAL EVERY 6 HOURS PRN
Status: DISCONTINUED | OUTPATIENT
Start: 2017-08-07 | End: 2017-08-09 | Stop reason: HOSPADM

## 2017-08-07 RX ORDER — SODIUM CHLORIDE, SODIUM LACTATE, POTASSIUM CHLORIDE, CALCIUM CHLORIDE 600; 310; 30; 20 MG/100ML; MG/100ML; MG/100ML; MG/100ML
INJECTION, SOLUTION INTRAVENOUS CONTINUOUS
Status: DISCONTINUED | OUTPATIENT
Start: 2017-08-07 | End: 2017-08-09 | Stop reason: HOSPADM

## 2017-08-07 RX ORDER — ONDANSETRON 4 MG/1
4 TABLET, ORALLY DISINTEGRATING ORAL EVERY 30 MIN PRN
Status: DISCONTINUED | OUTPATIENT
Start: 2017-08-07 | End: 2017-08-07 | Stop reason: HOSPADM

## 2017-08-07 RX ORDER — SODIUM CHLORIDE, SODIUM LACTATE, POTASSIUM CHLORIDE, CALCIUM CHLORIDE 600; 310; 30; 20 MG/100ML; MG/100ML; MG/100ML; MG/100ML
INJECTION, SOLUTION INTRAVENOUS CONTINUOUS
Status: DISCONTINUED | OUTPATIENT
Start: 2017-08-07 | End: 2017-08-07 | Stop reason: HOSPADM

## 2017-08-07 RX ORDER — ONDANSETRON 2 MG/ML
4 INJECTION INTRAMUSCULAR; INTRAVENOUS EVERY 30 MIN PRN
Status: DISCONTINUED | OUTPATIENT
Start: 2017-08-07 | End: 2017-08-07 | Stop reason: HOSPADM

## 2017-08-07 RX ORDER — DEXAMETHASONE SODIUM PHOSPHATE 4 MG/ML
INJECTION, SOLUTION INTRA-ARTICULAR; INTRALESIONAL; INTRAMUSCULAR; INTRAVENOUS; SOFT TISSUE PRN
Status: DISCONTINUED | OUTPATIENT
Start: 2017-08-07 | End: 2017-08-07

## 2017-08-07 RX ORDER — DIPHENHYDRAMINE HYDROCHLORIDE 50 MG/ML
25 INJECTION INTRAMUSCULAR; INTRAVENOUS EVERY 6 HOURS PRN
Status: DISCONTINUED | OUTPATIENT
Start: 2017-08-07 | End: 2017-08-09 | Stop reason: HOSPADM

## 2017-08-07 RX ORDER — MEPERIDINE HYDROCHLORIDE 25 MG/ML
12.5 INJECTION INTRAMUSCULAR; INTRAVENOUS; SUBCUTANEOUS EVERY 5 MIN PRN
Status: DISCONTINUED | OUTPATIENT
Start: 2017-08-07 | End: 2017-08-07 | Stop reason: HOSPADM

## 2017-08-07 RX ORDER — FENTANYL CITRATE 0.05 MG/ML
25-50 INJECTION, SOLUTION INTRAMUSCULAR; INTRAVENOUS
Status: DISCONTINUED | OUTPATIENT
Start: 2017-08-07 | End: 2017-08-07 | Stop reason: HOSPADM

## 2017-08-07 RX ORDER — ONDANSETRON 2 MG/ML
INJECTION INTRAMUSCULAR; INTRAVENOUS PRN
Status: DISCONTINUED | OUTPATIENT
Start: 2017-08-07 | End: 2017-08-07

## 2017-08-07 RX ORDER — BUPIVACAINE HYDROCHLORIDE AND EPINEPHRINE 2.5; 5 MG/ML; UG/ML
INJECTION, SOLUTION INFILTRATION; PERINEURAL PRN
Status: DISCONTINUED | OUTPATIENT
Start: 2017-08-07 | End: 2017-08-07 | Stop reason: HOSPADM

## 2017-08-07 RX ORDER — GLYCOPYRROLATE 0.2 MG/ML
INJECTION, SOLUTION INTRAMUSCULAR; INTRAVENOUS PRN
Status: DISCONTINUED | OUTPATIENT
Start: 2017-08-07 | End: 2017-08-07

## 2017-08-07 RX ORDER — PROCHLORPERAZINE MALEATE 5 MG
5-10 TABLET ORAL EVERY 6 HOURS PRN
Status: DISCONTINUED | OUTPATIENT
Start: 2017-08-07 | End: 2017-08-09 | Stop reason: HOSPADM

## 2017-08-07 RX ORDER — VECURONIUM BROMIDE 1 MG/ML
INJECTION, POWDER, LYOPHILIZED, FOR SOLUTION INTRAVENOUS PRN
Status: DISCONTINUED | OUTPATIENT
Start: 2017-08-07 | End: 2017-08-07

## 2017-08-07 RX ORDER — HEPARIN SODIUM 5000 [USP'U]/.5ML
5000 INJECTION, SOLUTION INTRAVENOUS; SUBCUTANEOUS
Status: COMPLETED | OUTPATIENT
Start: 2017-08-07 | End: 2017-08-07

## 2017-08-07 RX ADMIN — Medication 5 MG: at 08:15

## 2017-08-07 RX ADMIN — HYDROMORPHONE HYDROCHLORIDE 0.2 MG: 1 INJECTION, SOLUTION INTRAMUSCULAR; INTRAVENOUS; SUBCUTANEOUS at 09:20

## 2017-08-07 RX ADMIN — CEFAZOLIN SODIUM 2 G: 2 INJECTION, SOLUTION INTRAVENOUS at 18:10

## 2017-08-07 RX ADMIN — KETOROLAC TROMETHAMINE 30 MG: 30 INJECTION, SOLUTION INTRAMUSCULAR at 16:12

## 2017-08-07 RX ADMIN — ONDANSETRON 4 MG: 2 INJECTION INTRAMUSCULAR; INTRAVENOUS at 09:59

## 2017-08-07 RX ADMIN — SODIUM CHLORIDE, POTASSIUM CHLORIDE, SODIUM LACTATE AND CALCIUM CHLORIDE: 600; 310; 30; 20 INJECTION, SOLUTION INTRAVENOUS at 08:25

## 2017-08-07 RX ADMIN — MIDAZOLAM HYDROCHLORIDE 2 MG: 1 INJECTION, SOLUTION INTRAMUSCULAR; INTRAVENOUS at 07:30

## 2017-08-07 RX ADMIN — PROPOFOL 200 MG: 10 INJECTION, EMULSION INTRAVENOUS at 07:38

## 2017-08-07 RX ADMIN — NEOSTIGMINE METHYLSULFATE 5 MG: 1 INJECTION INTRAMUSCULAR; INTRAVENOUS; SUBCUTANEOUS at 10:16

## 2017-08-07 RX ADMIN — HYDROMORPHONE HYDROCHLORIDE 0.3 MG: 1 INJECTION, SOLUTION INTRAMUSCULAR; INTRAVENOUS; SUBCUTANEOUS at 09:01

## 2017-08-07 RX ADMIN — Medication 5 MG: at 08:42

## 2017-08-07 RX ADMIN — HYDROMORPHONE HYDROCHLORIDE 0.5 MG: 1 INJECTION, SOLUTION INTRAMUSCULAR; INTRAVENOUS; SUBCUTANEOUS at 09:30

## 2017-08-07 RX ADMIN — VECURONIUM BROMIDE 1 MG: 1 INJECTION, POWDER, LYOPHILIZED, FOR SOLUTION INTRAVENOUS at 09:02

## 2017-08-07 RX ADMIN — FENTANYL CITRATE 100 MCG: 50 INJECTION, SOLUTION INTRAMUSCULAR; INTRAVENOUS at 08:05

## 2017-08-07 RX ADMIN — VECURONIUM BROMIDE 1 MG: 1 INJECTION, POWDER, LYOPHILIZED, FOR SOLUTION INTRAVENOUS at 08:46

## 2017-08-07 RX ADMIN — VECURONIUM BROMIDE 1 MG: 1 INJECTION, POWDER, LYOPHILIZED, FOR SOLUTION INTRAVENOUS at 08:59

## 2017-08-07 RX ADMIN — VECURONIUM BROMIDE 2 MG: 1 INJECTION, POWDER, LYOPHILIZED, FOR SOLUTION INTRAVENOUS at 08:22

## 2017-08-07 RX ADMIN — Medication 1 G: at 09:45

## 2017-08-07 RX ADMIN — VECURONIUM BROMIDE 1 MG: 1 INJECTION, POWDER, LYOPHILIZED, FOR SOLUTION INTRAVENOUS at 09:39

## 2017-08-07 RX ADMIN — SODIUM CHLORIDE, POTASSIUM CHLORIDE, SODIUM LACTATE AND CALCIUM CHLORIDE: 600; 310; 30; 20 INJECTION, SOLUTION INTRAVENOUS at 06:17

## 2017-08-07 RX ADMIN — ROCURONIUM BROMIDE 50 MG: 10 INJECTION INTRAVENOUS at 07:38

## 2017-08-07 RX ADMIN — HYDROMORPHONE HYDROCHLORIDE: 10 INJECTION, SOLUTION INTRAMUSCULAR; INTRAVENOUS; SUBCUTANEOUS at 10:51

## 2017-08-07 RX ADMIN — KETOROLAC TROMETHAMINE 30 MG: 30 INJECTION, SOLUTION INTRAMUSCULAR at 10:08

## 2017-08-07 RX ADMIN — FENTANYL CITRATE 100 MCG: 50 INJECTION, SOLUTION INTRAMUSCULAR; INTRAVENOUS at 07:38

## 2017-08-07 RX ADMIN — HYDROMORPHONE HYDROCHLORIDE 1.8 MG: 10 INJECTION, SOLUTION INTRAMUSCULAR; INTRAVENOUS; SUBCUTANEOUS at 21:52

## 2017-08-07 RX ADMIN — HEPARIN SODIUM 5000 UNITS: 10000 INJECTION, SOLUTION INTRAVENOUS; SUBCUTANEOUS at 07:48

## 2017-08-07 RX ADMIN — GLYCOPYRROLATE 0.8 MG: 0.2 INJECTION, SOLUTION INTRAMUSCULAR; INTRAVENOUS at 10:16

## 2017-08-07 RX ADMIN — SODIUM CHLORIDE 1000 ML: 900 IRRIGANT IRRIGATION at 08:26

## 2017-08-07 RX ADMIN — SODIUM CHLORIDE, POTASSIUM CHLORIDE, SODIUM LACTATE AND CALCIUM CHLORIDE: 600; 310; 30; 20 INJECTION, SOLUTION INTRAVENOUS at 19:18

## 2017-08-07 RX ADMIN — KETOROLAC TROMETHAMINE 30 MG: 30 INJECTION, SOLUTION INTRAMUSCULAR at 21:53

## 2017-08-07 RX ADMIN — PROCHLORPERAZINE EDISYLATE 10 MG: 5 INJECTION INTRAMUSCULAR; INTRAVENOUS at 12:44

## 2017-08-07 RX ADMIN — Medication 3 G: at 07:45

## 2017-08-07 RX ADMIN — LIDOCAINE HYDROCHLORIDE 100 MG: 20 INJECTION, SOLUTION INFILTRATION; PERINEURAL at 07:38

## 2017-08-07 RX ADMIN — LIDOCAINE HYDROCHLORIDE 1 ML: 10 INJECTION, SOLUTION EPIDURAL; INFILTRATION; INTRACAUDAL; PERINEURAL at 06:17

## 2017-08-07 RX ADMIN — HYDROMORPHONE HYDROCHLORIDE 0.5 MG: 1 INJECTION, SOLUTION INTRAMUSCULAR; INTRAVENOUS; SUBCUTANEOUS at 11:04

## 2017-08-07 RX ADMIN — BUPIVACAINE HYDROCHLORIDE AND EPINEPHRINE BITARTRATE 30 ML: 2.5; .005 INJECTION, SOLUTION EPIDURAL; INFILTRATION; INTRACAUDAL; PERINEURAL at 10:10

## 2017-08-07 RX ADMIN — ONDANSETRON 4 MG: 2 SOLUTION INTRAMUSCULAR; INTRAVENOUS at 11:20

## 2017-08-07 RX ADMIN — SODIUM CHLORIDE, POTASSIUM CHLORIDE, SODIUM LACTATE AND CALCIUM CHLORIDE: 600; 310; 30; 20 INJECTION, SOLUTION INTRAVENOUS at 12:15

## 2017-08-07 RX ADMIN — DEXAMETHASONE SODIUM PHOSPHATE 4 MG: 4 INJECTION, SOLUTION INTRA-ARTICULAR; INTRALESIONAL; INTRAMUSCULAR; INTRAVENOUS; SOFT TISSUE at 07:59

## 2017-08-07 ASSESSMENT — PAIN DESCRIPTION - DESCRIPTORS: DESCRIPTORS: CONSTANT

## 2017-08-07 NOTE — DISCHARGE INSTRUCTIONS
For informational purposes only. Not to replace the advice of your health care provider. Copyright   2006 [facility]. All rights reserved. [employer] [id number] - REV 09/14  After Bariatric Surgery  [facility]  Note: Before you go home, ask your nurse to order your pain medicine from the pharmacy. Be sure you have your medicine with you when you leave.  How much fluid should I drink?    Drink at least 1 ounce of fluid every 15 minutes (1/2 cup per hour) during the day.     Carry a water bottle with you. Drink from it often.    Keep track of how much fluid you drink in a day.    Adjustable stomach band: Do not overeat or drink too much. This can cause vomiting (throwing up), stretch your stomach, or make your stomach slip up over your band.    Remember:  - Do not use straws, chew gum or suck on hard candies. They may cause painful gas.   - No cold drinks.  - No coffee, soda pop or drinks with caffeine. These may cause stomach pain.  - No alcohol. It is bad for your liver and will cause stomach pain. It also adds a lot of calories.  What can I do for pain control?      You had major abdominal surgery that involves all layers of your abdominal muscles.   Pain is expected, even as far out as 6-8 weeks postop.  Moving, sneezing, coughing, and  breathing will cause pain because these activities use your abdominal muscles.      You can take the liquid pain medicine as prescribed. You can also try to wean off from it  as soon as you feel comfortable.  Do not drive while you are taking pain medicine.   This is dangerous.     You may take liquid Tylenol (acetaminophen) for pain in place of the prescribed pain  medicine. Do not take more than 3000 mg in a 24 hour period.     You may also apply ice or heat to the affected area(s).  Just remember to wrap the ice  in something and limit icing sessions to 20 minutes. Excessive icing can irritate the skin  or cause tissue damage.   You can apply  heat with a hot, wet towel or heating pad. Just like cold therapy, limit  heat application to 20 minutes. Never sleep with a heating pad on. It could cause severe  burns to your skin.    Do not take NSAID s (ibuprofen, Motrin, Advil, Aleve, Naproxen). They will increase you risk for bleeding or getting an ulcer.    If you have any of the following pain issues, we would like to talk to you:  - pain that does not improve with rest  - pain that gets worse and worse  - pain that is not controlled by your pain medicine  - a sudden severe increase in pain  -   If your doctor prescribes Zantac, take it as ordered. Take it for 3 months to prevent       Ulcers.  -   If you took an antacid before you had surgery, keep taking it for 3 months after           surgery. This will help prevent ulcers.   - Take any other medicines that your doctor tells you to take.   - Wear your binder to support your belly muscles.  Please call the clinic at [phone] to discuss prior to going to ED.     How much rest do I need?  Get plenty of rest the first few days after surgery. Balance rest and activity.  Do not nap more than one hour during the day. Set a timer to wake yourself up, if needed. Too much sleep will keep you from drinking enough fluid during the day.  What should I know about my incisions (cuts)?  - Change your bandages as needed or if they get wet.   -Call your doctor if you have any of these signs of infection:   - Redness around the site.   - Drainage that smells bad.   - Fever of 101  F (38.3  C) or higher when taken under the tongue.- Chills.  If you     have a drain:  - Do not pull on the drain. Do not pull the drain out. We will take out your drain at your first clinic visit.  - The color and amount of fluid varies. Right after surgery the fluid is bright red. Over time, it changes to light pink and may become clear or the color of straw.   Will my urine or bowel movements change?   You might not have a bowel movement  for several days after surgery. Your first bowel movements will likely be liquid.   Gastric bypass or sleeve gastrectomy: You may also notice old blood or a darker color in your stools (bowel movements).   Your urine should be clear to light yellow. This shows that you are drinking enough fluid. You should urinate (pass water) at least 2 to 3 times during the day. If not, call us.  What kind of activity is safe?  For 4 weeks after surgery:     Walk for a short time every day.    Do not jog or run.    No weight lifting or belly exercises.  No swimming, baths or hot tubs until your cuts are healed (scabs are gone). You may shower.  No outside activity in hot, humid weather until you can drink 48 to 64 ounces of fluid in 24 hours. If you sweat a lot, your body may lose too much water.   The first month after surgery, do not take any trips where you must sit for a long time. You could get a blood clot in your legs.    Call the clinic at [phone] if:    Your pain medicine is not working.    You do not urinate (pass water) 2 to 3 times per day.    You have any signs of infection.    You have belly pain that gets worse and worse.    You have swollen legs with pain behind the knee or calf.    You have chest pain or feel very short of breath.    You have any questions or concerns.    You have a sudden severe increase in heart rate.    You have vomiting that gets worse and worse.  When should I go back to the clinic?  Time Gastric bypass or sleeve gastrectomy Adjustable gastric band   Week 1 See the physician or physician assistant (PA). See the nurse and physical therapist.   Week 2 See the nurse and dietitian. See the nurse and dietitian.   Week 4 Have a nutrition consult with the dietitian. See the PA and dietitian.   Week 6  Go for the first adjustment (fill) of your stomach band.   For the first year (or until your BMI is less than 30) Go to the clinic each month to see if you need a band adjustment (fill).

## 2017-08-07 NOTE — PROGRESS NOTES
"Patient refused to wear CPAP for the night. Pt stated, \"I don't wear CPAP at home.\"  8/7/2017  Theresa Chan RRT  "

## 2017-08-07 NOTE — IP AVS SNAPSHOT
[facility] [facility]    [address] [place] [address]    Phone:  [phone]                                       After Visit Summary   8/7/2017    Clarissa Paulino    MRN: [id number]           After Visit Summary Signature Page     I have received my discharge instructions, and my questions have been answered. I have discussed any challenges I see with this plan with the nurse or doctor.    ..........................................................................................................................................  Patient/Patient Representative Signature      ..........................................................................................................................................  Patient Representative Print Name and Relationship to Patient    ..................................................               ................................................  Date                                            Time    ..........................................................................................................................................  Reviewed by Signature/Title    ...................................................              ..............................................  Date                                                            Time

## 2017-08-07 NOTE — BRIEF OP NOTE
General Surgery Brief Operative Note    Pre-operative diagnosis: MORBID OBESITY   Post-operative diagnosis Same   Procedure: Procedure(s):  LAPAROSCOPIC GASTRIC SLEEVE - Wound Class: I-Clean    Surgeon(s), Assistant(s): Surgeon(s) and Role:     * Sloan Burnette MD - Primary     * Blanca Hernandez PA-C - Assisting   Estimated blood loss: 25 mL   Drains: None   Specimens:   ID Type Source Tests Collected by Time Destination   A : portion of stomach Tissue Other SURGICAL PATHOLOGY EXAM Sloan Burnette MD 8/7/2017  9:56 AM       Findings: See postop diagnosis   Condition: Stable   Comments:      Blanca Hernandez PA-C See dictated operative report for full details

## 2017-08-07 NOTE — IP AVS SNAPSHOT
MRN:[id number]                      After Visit Summary   8/7/2017    Clarissa Paulino    MRN: [id number]           Thank you!     Thank you for choosing [facility] for your care. Our goal is always to provide you with excellent care. Hearing back from our patients is one way we can continue to improve our services. Please take a few minutes to complete the written survey that you may receive in the mail after you visit with us. Thank you!        Patient Information     Date Of Birth          1985        Designated Caregiver       Most Recent Value    Caregiver    Will someone help with your care after discharge? yes    Name of designated caregiver Nick    Phone number of caregiver [phone]    Caregiver address [address] [place] [address]      About your hospital stay     You were admitted on:  August 7, 2017 You last received care in the:  [facility] [address] [facility]    You were discharged on:  August 9, 2017        Reason for your hospital stay       Gastric sleeve                  Who to Call     For medical emergencies, please call 911.  For non-urgent questions about your medical care, please call your primary care provider or clinic, [phone]  For questions related to your surgery, please call your surgery clinic        Attending Provider     Provider Specialty    Sloan Burnette MD Surgery       Primary Care Provider Office Phone # Fax #    Krista Landaverde [phone]      After Care Instructions     Activity       Your activity upon discharge: activity as tolerated. No heavy lifting > 20 lbs or strenuous exercise x 2-3 weeks. No driving or alcohol while on pain meds.            Diet       Follow this diet upon discharge: bariatric fulls            Wound care and dressings       Instructions to care for your wound at home: keep wound(s) clean and dry. You may shower, but do not soak incisions x 2 weeks. Leave steri strips in  place, they will fall off on their own. Apply dry dressing as needed.                  Follow-up Appointments     Follow Up and recommended labs and tests       Follow up at the [facility] next week as scheduled.  Please call the clinic if you have any questions or concerns.  Follow up with your PCP in 4-6 weeks.  Continue to ambulate and use your incentive spirometer at home.  Do not take NSAIDs (such as ibuprofen, naproxen, or aspirin).  You may hold your vitamins/supplements if you are having nausea, try to resume your chewable vitamin if you are able to.  You may take a chewable calcium in place of the tablet for 1-2 months.                  Your next 10 appointments already scheduled     Aug 15, 2017  1:00 PM CDT   Post Op with Hannah Mays Rn, RN   [facility] ([facility])    [address] [place] [address]   [phone]            Aug 15, 2017  1:20 PM CDT   Post Op with Dallas San PA-C   [facility])    [address] [place] [address]   [phone]            Aug 21, 2017 10:00 AM CDT   Post Op with Hannah Mays Rn, RN   [facility] ([facility])    [address] [place] [address]   [phone]            Aug 21, 2017 10:30 AM CDT   Post Op with Hannah Mays Diet 1, RD   [facility])    [address] [place] [address]   [phone]            Sep 07, 2017  9:00 AM CDT   Post Op with Hannah Mays Diet 3, RD   [facility] ([facility])    [address] [place] [address]   [phone]              Further instructions from your care team            For informational purposes  only. Not to replace the advice of your health care provider. Copyright   2006 [facility]. All rights reserved. [employer] [id number] - REV 09/14  After Bariatric Surgery  [facility] Weight Loss  Note: Before you go home, ask your nurse to order your pain medicine from the pharmacy. Be sure you have your medicine with you when you leave.  How much fluid should I drink?    Drink at least 1 ounce of fluid every 15 minutes (1/2 cup per hour) during the day.     Carry a water bottle with you. Drink from it often.    Keep track of how much fluid you drink in a day.    Adjustable stomach band: Do not overeat or drink too much. This can cause vomiting (throwing up), stretch your stomach, or make your stomach slip up over your band.    Remember:  - Do not use straws, chew gum or suck on hard candies. They may cause painful gas.   - No cold drinks.  - No coffee, soda pop or drinks with caffeine. These may cause stomach pain.  - No alcohol. It is bad for your liver and will cause stomach pain. It also adds a lot of calories.  What can I do for pain control?      You had major abdominal surgery that involves all layers of your abdominal muscles.   Pain is expected, even as far out as 6-8 weeks postop.  Moving, sneezing, coughing, and  breathing will cause pain because these activities use your abdominal muscles.      You can take the liquid pain medicine as prescribed. You can also try to wean off from it  as soon as you feel comfortable.  Do not drive while you are taking pain medicine.   This is dangerous.     You may take liquid Tylenol (acetaminophen) for pain in place of the prescribed pain  medicine. Do not take more than 3000 mg in a 24 hour period.     You may also apply ice or heat to the affected area(s).  Just remember to wrap the ice  in something and limit icing sessions to 20 minutes. Excessive icing can irritate the skin  or cause tissue damage.   You can apply heat with a hot, wet towel or  heating pad. Just like cold therapy, limit  heat application to 20 minutes. Never sleep with a heating pad on. It could cause severe  burns to your skin.    Do not take NSAID s (ibuprofen, Motrin, Advil, Aleve, Naproxen). They will increase you risk for bleeding or getting an ulcer.    If you have any of the following pain issues, we would like to talk to you:  - pain that does not improve with rest  - pain that gets worse and worse  - pain that is not controlled by your pain medicine  - a sudden severe increase in pain  -   If your doctor prescribes Zantac, take it as ordered. Take it for 3 months to prevent       Ulcers.  -   If you took an antacid before you had surgery, keep taking it for 3 months after           surgery. This will help prevent ulcers.   - Take any other medicines that your doctor tells you to take.   - Wear your binder to support your belly muscles.  Please call the clinic at [phone] to discuss prior to going to ED.     How much rest do I need?  Get plenty of rest the first few days after surgery. Balance rest and activity.  Do not nap more than one hour during the day. Set a timer to wake yourself up, if needed. Too much sleep will keep you from drinking enough fluid during the day.  What should I know about my incisions (cuts)?  - Change your bandages as needed or if they get wet.   -Call your doctor if you have any of these signs of infection:   - Redness around the site.   - Drainage that smells bad.   - Fever of 101  F (38.3  C) or higher when taken under the tongue.- Chills.  If you     have a drain:  - Do not pull on the drain. Do not pull the drain out. We will take out your drain at your first clinic visit.  - The color and amount of fluid varies. Right after surgery the fluid is bright red. Over time, it changes to light pink and may become clear or the color of straw.   Will my urine or bowel movements change?   You might not have a bowel movement for several days after surgery.  Your first bowel movements will likely be liquid.   Gastric bypass or sleeve gastrectomy: You may also notice old blood or a darker color in your stools (bowel movements).   Your urine should be clear to light yellow. This shows that you are drinking enough fluid. You should urinate (pass water) at least 2 to 3 times during the day. If not, call us.  What kind of activity is safe?  For 4 weeks after surgery:     Walk for a short time every day.    Do not jog or run.    No weight lifting or belly exercises.  No swimming, baths or hot tubs until your cuts are healed (scabs are gone). You may shower.  No outside activity in hot, humid weather until you can drink 48 to 64 ounces of fluid in 24 hours. If you sweat a lot, your body may lose too much water.   The first month after surgery, do not take any trips where you must sit for a long time. You could get a blood clot in your legs.    Call the clinic at [phone] if:    Your pain medicine is not working.    You do not urinate (pass water) 2 to 3 times per day.    You have any signs of infection.    You have belly pain that gets worse and worse.    You have swollen legs with pain behind the knee or calf.    You have chest pain or feel very short of breath.    You have any questions or concerns.    You have a sudden severe increase in heart rate.    You have vomiting that gets worse and worse.  When should I go back to the clinic?  Time Gastric bypass or sleeve gastrectomy Adjustable gastric band   Week 1 See the physician or physician assistant (PA). See the nurse and physical therapist.   Week 2 See the nurse and dietitian. See the nurse and dietitian.   Week 4 Have a nutrition consult with the dietitian. See the PA and dietitian.   Week 6  Go for the first adjustment (fill) of your stomach band.   For the first year (or until your BMI is less than 30) Go to the clinic each month to see if you need a band adjustment (fill).         Pending Results     No orders  "found from 8/5/2017 to 8/8/2017.            Statement of Approval     Ordered          08/09/17 0920  I have reviewed and agree with all the recommendations and orders detailed in this document.  EFFECTIVE NOW     Approved and electronically signed by:  Blanca Hernandez PA-C             Admission Information     Date & Time Provider Department Dept. Phone    8/7/2017 Sloan Burnette MD [facility] [address] [facility] [phone]      Your Vitals Were     Blood Pressure Pulse Temperature Respirations Height Weight    149/90 73 97.6  F (36.4  C) (Oral) 16 1.702 m (5' 7\") 132.9 kg (293 lb)    Last Period Pulse Oximetry BMI (Body Mass Index)             07/05/2017 96% 45.89 kg/m2         MyChart Information     [facility] gives you secure access to your electronic health record. If you see a primary care provider, you can also send messages to your care team and make appointments. If you have questions, please call your primary care clinic.  If you do not have a primary care provider, please call [phone] and they will assist you.        Care EveryWhere ID     This is your Care EveryWhere ID. This could be used by other organizations to access your [facility] medical records  [id number]        Equal Access to Services     QIAN SANCHEZ AH: Hadii john lunsfordo Soeusebio, waaxda luqadaha, qaybta kaalmada adeegyada, soren shelley. So [facility] [phone].    ATENCIÓN: Si habla español, tiene a chirinos disposición servicios gratuitos de asistencia lingüística. Llame al [phone].    We comply with applicable federal civil rights laws and [place] laws. We do not discriminate on the basis of race, color, national origin, age, disability sex, sexual orientation or gender identity.               Review of your medicines      START taking        Dose / Directions    ondansetron 4 MG ODT tab   Commonly known as:  ZOFRAN-ODT   Used for:  Morbid obesity with body mass index of 40.0-44.9 in " adult (H)        Dose:  4 mg   Take 1 tablet (4 mg) by mouth every 6 hours as needed for nausea or vomiting   Quantity:  20 tablet   Refills:  0       oxyCODONE 5 MG/5ML solution   Commonly known as:  ROXICODONE   Used for:  Morbid obesity with body mass index of 40.0-44.9 in adult (H)        Dose:  5-10 mg   Take 5-10 mLs (5-10 mg) by mouth every 4 hours as needed for moderate to severe pain   Quantity:  320 mL   Refills:  0       ranitidine 150 MG tablet   Commonly known as:  ZANTAC   Used for:  Morbid obesity with body mass index of 40.0-44.9 in adult (H)        Dose:  150 mg   Take 1 tablet (150 mg) by mouth 2 times daily   Quantity:  60 tablet   Refills:  2         CONTINUE these medicines which have NOT CHANGED        Dose / Directions    calcium citrate-vitamin D 315-250 MG-UNIT Tabs per tablet   Commonly known as:  CITRACAL        Dose:  2 tablet   Take 2 tablets by mouth 2 times daily   Refills:  0       LEXAPRO PO        Dose:  10 mg   Take 10 mg by mouth daily   Refills:  0       PRENATAL VITAMINS PO        Dose:  1 tablet   Take 1 tablet by mouth daily   Refills:  0       VITAMIN D (CHOLECALCIFEROL) PO        Dose:  2000 Units   Take 2,000 Units by mouth daily   Refills:  0         STOP taking     OMEPRAZOLE PO                Where to get your medicines      These medications were sent to [facility] - [address]  [address], [place] [address]     Phone:  [phone]     ondansetron 4 MG ODT tab    ranitidine 150 MG tablet         Some of these will need a paper prescription and others can be bought over the counter. Ask your nurse if you have questions.     Bring a paper prescription for each of these medications     oxyCODONE 5 MG/5ML solution                Protect others around you: Learn how to safely use, store and throw away your medicines at www.disposemymeds.org.             Medication List: This is a list of all your medications and when to take  them. Check marks below indicate your daily home schedule. Keep this list as a reference.      Medications           Morning Afternoon Evening Bedtime As Needed    calcium citrate-vitamin D 315-250 MG-UNIT Tabs per tablet   Commonly known as:  CITRACAL   Take 2 tablets by mouth 2 times daily                                LEXAPRO PO   Take 10 mg by mouth daily   Last time this was given:  10 mg on 8/9/2017  9:01 AM   Next Dose Due:  8/10/17                                  ondansetron 4 MG ODT tab   Commonly known as:  ZOFRAN-ODT   Take 1 tablet (4 mg) by mouth every 6 hours as needed for nausea or vomiting   Last time this was given:  4 mg on 8/9/2017  3:40 AM                                oxyCODONE 5 MG/5ML solution   Commonly known as:  ROXICODONE   Take 5-10 mLs (5-10 mg) by mouth every 4 hours as needed for moderate to severe pain   Last time this was given:  10 mg on 8/9/2017  9:28 AM   Next Dose Due:  130pm 8/9/2017                                      PRENATAL VITAMINS PO   Take 1 tablet by mouth daily                                ranitidine 150 MG tablet   Commonly known as:  ZANTAC   Take 1 tablet (150 mg) by mouth 2 times daily   Last time this was given:  150 mg on 8/9/2017  9:01 AM   Next Dose Due:  8/9/2017 pm                                VITAMIN D (CHOLECALCIFEROL) PO   Take 2,000 Units by mouth daily

## 2017-08-07 NOTE — PLAN OF CARE
Problem: Goal Outcome Summary  Goal: Goal Outcome Summary  Outcome: No Change  VSS, bowels hypo, intermittent nausea, 8 zofran total given between OR and PACU, 7 lap sites and some scratches on belly, IS up to 750

## 2017-08-07 NOTE — ANESTHESIA POSTPROCEDURE EVALUATION
Patient: Clarissa Paulino    Procedure(s):  LAPAROSCOPIC GASTRIC SLEEVE - Wound Class: I-Clean    Diagnosis:MORBID  Diagnosis Additional Information: No value filed.    Anesthesia Type:  General, ETT    Note:  Anesthesia Post Evaluation    Patient location during evaluation: bedside  Patient participation: Able to fully participate in evaluation  Level of consciousness: awake  Pain management: adequate  Airway patency: patent  Cardiovascular status: acceptable  Respiratory status: acceptable  Hydration status: acceptable  PONV: none     Anesthetic complications: None    Comments: No anesthetic complications noted.         Last vitals:  Vitals:    08/07/17 1240 08/07/17 1311 08/07/17 1410   BP: 125/70 122/67 117/66   Pulse:      Resp: 16 16 16   Temp:      SpO2: 97%  95%         Electronically Signed By: Prince Baumann DO, DO  August 7, 2017  3:47 PM

## 2017-08-07 NOTE — ANESTHESIA PREPROCEDURE EVALUATION
Anesthesia Evaluation     .             ROS/MED HX    ENT/Pulmonary:  - neg pulmonary ROS    (-) sleep apnea   Neurologic:  - neg neurologic ROS     Cardiovascular:  - neg cardiovascular ROS       METS/Exercise Tolerance:     Hematologic:  - neg hematologic  ROS       Musculoskeletal:  - neg musculoskeletal ROS       GI/Hepatic:     (+) GERD       Renal/Genitourinary:  - ROS Renal section negative       Endo:     (+) Obesity, .      Psychiatric:     (+) psychiatric history anxiety      Infectious Disease:         Malignancy:         Other:                     Physical Exam  Normal systems: cardiovascular, pulmonary and dental    Airway   Mallampati: II  TM distance: >3 FB  Neck ROM: full    Dental     Cardiovascular       Pulmonary                     Anesthesia Plan      History & Physical Review  History and physical reviewed and following examination; no interval change.    ASA Status:  2 .    NPO Status:  > 8 hours    Plan for General and ETT with Intravenous induction. Maintenance will be Balanced.    PONV prophylaxis:  Ondansetron (or other 5HT-3) and Dexamethasone or Solumedrol  Additional equipment: Videolaryngoscope      Postoperative Care  Postoperative pain management:  IV analgesics.      Consents  Anesthetic plan, risks, benefits and alternatives discussed with:  Patient.  Use of blood products discussed: Yes.   Use of blood products discussed with Patient.  Consented to blood products.  .            Procedure: Procedure(s):  COMBINED LAPAROSCOPIC GASTRIC SLEEVE, CHOLECYSTECTOMY  Preop diagnosis: MORBID    Allergies   Allergen Reactions     No Clinical Screening - See Comments Anaphylaxis     Mice     Past Medical History:   Diagnosis Date     Gastroesophageal reflux disease      Past Surgical History:   Procedure Laterality Date     ORTHOPEDIC SURGERY  1991    Left elbow     Prior to Admission medications    Medication Sig Start Date End Date Taking? Authorizing Provider   Escitalopram Oxalate  (LEXAPRO PO) Take 10 mg by mouth daily   Yes Reported, Patient   calcium citrate-vitamin D (CITRACAL) 315-250 MG-UNIT TABS per tablet Take 2 tablets by mouth 2 times daily    Yes Reported, Patient   VITAMIN D, CHOLECALCIFEROL, PO Take 2,000 Units by mouth daily   Yes Reported, Patient   OMEPRAZOLE PO Take 20 mg by mouth daily   Yes Reported, Patient   Prenatal Multivit-Min-Fe-FA (PRENATAL VITAMINS PO) Take 1 tablet by mouth daily    Yes Reported, Patient     Current Facility-Administered Medications Ordered in Epic   Medication Dose Route Frequency Last Rate Last Dose     heparin sodium PF injection 5,000 Units  5,000 Units Subcutaneous Pre-Op/Pre-procedure x 1 dose         ceFAZolin (ANCEF) intermittent infusion 3 g (pre-mix)  3 g Intravenous Pre-Op/Pre-procedure x 1 dose         lidocaine 1 % 1 mL  1 mL Other Q1H PRN   1 mL at 08/07/17 0617     lactated ringers infusion   Intravenous Continuous 25 mL/hr at 08/07/17 0617       No current [facility]-ordered outpatient prescriptions on file.     Wt Readings from Last 1 Encounters:   08/07/17 132.9 kg (293 lb)     Temp Readings from Last 1 Encounters:   08/07/17 36.6  C (97.9  F) (Temporal)     BP Readings from Last 6 Encounters:   08/07/17 137/78   07/26/17 116/78   06/28/17 126/77   05/16/17 125/82   05/10/17 126/83   10/05/16 129/82     Pulse Readings from Last 4 Encounters:   08/07/17 86   07/26/17 86   06/28/17 85   05/16/17 75     Resp Readings from Last 1 Encounters:   08/07/17 16     SpO2 Readings from Last 1 Encounters:   08/07/17 99%     Recent Labs   Lab Test  05/16/17   0801  06/28/16   1603   NA  138  137   POTASSIUM  4.2  3.6   CHLORIDE  102  102   CO2  30  28   ANIONGAP  6  7   GLC  98  81   BUN  10  20   CR  0.74  0.83   JUSTIN  9.0  8.1*     Recent Labs   Lab Test  05/16/17   0801  06/28/16   1603   AST  31  30   ALT  37  40     Recent Labs   Lab Test  08/07/17   0555  05/16/17   0801  06/28/16   1603   WBC   --   7.9  9.3   HGB  14.8  14.6  13.9   PLT   --    243  227     No results for input(s): INR in the last 89974 hours.    Invalid input(s): APTT   No results for input(s): TROPI in the last 71122 hours.  RECENT LABS:   ECG:   ECHO:   CXR:                  .

## 2017-08-07 NOTE — ANESTHESIA CARE TRANSFER NOTE
Patient: Clarissa Paulino    Procedure(s):  LAPAROSCOPIC GASTRIC SLEEVE - Wound Class: I-Clean    Diagnosis: MORBID  Diagnosis Additional Information: No value filed.    Anesthesia Type:   General, ETT     Note:  Airway :Face Mask  Patient transferred to:PACU  Comments: Awake, VSS, report to RN, monitors and alarms on, IV patent.      Vitals: (Last set prior to Anesthesia Care Transfer)    CRNA VITALS  8/7/2017 0959 - 8/7/2017 1035      8/7/2017             Resp Rate (set): 10                Electronically Signed By: CARI Skinner CRNA  August 7, 2017  10:35 AM

## 2017-08-07 NOTE — PROGRESS NOTES
Admission medication history interview status for the 8/7/2017  admission is complete. See EPIC admission navigator for prior to admission medications     Medication history source reliability:Good    Medication history interview source(s):Patient    Medication history resources (including written lists, pill bottles, clinic record):None    Primary pharmacy.[facility]    Additional medication history information not noted on PTA med list :None    Time spent in this activity: 40 minutes    Prior to Admission medications    Medication Sig Last Dose Taking? Auth Provider   Escitalopram Oxalate (LEXAPRO PO) Take 10 mg by mouth daily 8/7/2017 at 0445 Yes Reported, Patient   calcium citrate-vitamin D (CITRACAL) 315-250 MG-UNIT TABS per tablet Take 2 tablets by mouth 2 times daily  8/6/2017 at PM Yes Reported, Patient   VITAMIN D, CHOLECALCIFEROL, PO Take 2,000 Units by mouth daily 8/7/2017 at 0445 Yes Reported, Patient   OMEPRAZOLE PO Take 20 mg by mouth daily 8/7/2017 at 0445 Yes Reported, Patient   Prenatal Multivit-Min-Fe-FA (PRENATAL VITAMINS PO) Take 1 tablet by mouth daily  8/7/2017 at 0445 Yes Reported, Patient

## 2017-08-08 ENCOUNTER — APPOINTMENT (OUTPATIENT)
Dept: GENERAL RADIOLOGY | Facility: CLINIC | Age: 32
DRG: 621 | End: 2017-08-08
Attending: PHYSICIAN ASSISTANT
Payer: COMMERCIAL

## 2017-08-08 LAB
ANION GAP SERPL CALCULATED.3IONS-SCNC: 8 MMOL/L (ref 3–14)
CHLORIDE SERPL-SCNC: 105 MMOL/L (ref 94–109)
CO2 SERPL-SCNC: 27 MMOL/L (ref 20–32)
COPATH REPORT: NORMAL
HGB BLD-MCNC: 12.5 G/DL (ref 11.7–15.7)
POTASSIUM SERPL-SCNC: 3.5 MMOL/L (ref 3.4–5.3)
SODIUM SERPL-SCNC: 140 MMOL/L (ref 133–144)

## 2017-08-08 PROCEDURE — 40000986 XR UPPER GI WATER SOLUBLE

## 2017-08-08 PROCEDURE — 25000128 H RX IP 250 OP 636: Performed by: PHYSICIAN ASSISTANT

## 2017-08-08 PROCEDURE — 12000007 ZZH R&B INTERMEDIATE

## 2017-08-08 PROCEDURE — 25000132 ZZH RX MED GY IP 250 OP 250 PS 637: Performed by: SURGERY

## 2017-08-08 PROCEDURE — 80051 ELECTROLYTE PANEL: CPT | Performed by: PHYSICIAN ASSISTANT

## 2017-08-08 PROCEDURE — 85018 HEMOGLOBIN: CPT | Performed by: PHYSICIAN ASSISTANT

## 2017-08-08 PROCEDURE — 36415 COLL VENOUS BLD VENIPUNCTURE: CPT | Performed by: PHYSICIAN ASSISTANT

## 2017-08-08 PROCEDURE — 25000132 ZZH RX MED GY IP 250 OP 250 PS 637: Performed by: PHYSICIAN ASSISTANT

## 2017-08-08 RX ORDER — DIAZEPAM 10 MG/2ML
2.5 INJECTION, SOLUTION INTRAMUSCULAR; INTRAVENOUS EVERY 4 HOURS PRN
Status: DISCONTINUED | OUTPATIENT
Start: 2017-08-08 | End: 2017-08-09 | Stop reason: HOSPADM

## 2017-08-08 RX ORDER — SIMETHICONE 80 MG
80 TABLET,CHEWABLE ORAL EVERY 6 HOURS PRN
Status: DISCONTINUED | OUTPATIENT
Start: 2017-08-08 | End: 2017-08-09 | Stop reason: HOSPADM

## 2017-08-08 RX ADMIN — CEFAZOLIN SODIUM 2 G: 2 INJECTION, SOLUTION INTRAVENOUS at 01:24

## 2017-08-08 RX ADMIN — ESCITALOPRAM 10 MG: 10 TABLET, FILM COATED ORAL at 14:24

## 2017-08-08 RX ADMIN — SODIUM CHLORIDE, POTASSIUM CHLORIDE, SODIUM LACTATE AND CALCIUM CHLORIDE: 600; 310; 30; 20 INJECTION, SOLUTION INTRAVENOUS at 22:03

## 2017-08-08 RX ADMIN — ENOXAPARIN SODIUM 40 MG: 40 INJECTION SUBCUTANEOUS at 21:07

## 2017-08-08 RX ADMIN — OXYCODONE HYDROCHLORIDE 10 MG: 5 SOLUTION ORAL at 22:03

## 2017-08-08 RX ADMIN — SODIUM CHLORIDE, POTASSIUM CHLORIDE, SODIUM LACTATE AND CALCIUM CHLORIDE: 600; 310; 30; 20 INJECTION, SOLUTION INTRAVENOUS at 16:12

## 2017-08-08 RX ADMIN — SODIUM CHLORIDE, POTASSIUM CHLORIDE, SODIUM LACTATE AND CALCIUM CHLORIDE: 600; 310; 30; 20 INJECTION, SOLUTION INTRAVENOUS at 02:44

## 2017-08-08 RX ADMIN — OXYCODONE HYDROCHLORIDE 5 MG: 5 SOLUTION ORAL at 18:10

## 2017-08-08 RX ADMIN — ENOXAPARIN SODIUM 40 MG: 40 INJECTION SUBCUTANEOUS at 09:41

## 2017-08-08 RX ADMIN — SIMETHICONE CHEW TAB 80 MG 80 MG: 80 TABLET ORAL at 20:32

## 2017-08-08 RX ADMIN — PROCHLORPERAZINE EDISYLATE 10 MG: 5 INJECTION INTRAMUSCULAR; INTRAVENOUS at 09:37

## 2017-08-08 RX ADMIN — DIATRIZOATE MEGLUMINE AND DIATRIZOATE SODIUM 30 ML: 660; 100 SOLUTION ORAL; RECTAL at 08:32

## 2017-08-08 RX ADMIN — OXYCODONE HYDROCHLORIDE 5 MG: 5 SOLUTION ORAL at 14:10

## 2017-08-08 RX ADMIN — KETOROLAC TROMETHAMINE 30 MG: 30 INJECTION, SOLUTION INTRAMUSCULAR at 04:02

## 2017-08-08 RX ADMIN — SODIUM CHLORIDE, POTASSIUM CHLORIDE, SODIUM LACTATE AND CALCIUM CHLORIDE: 600; 310; 30; 20 INJECTION, SOLUTION INTRAVENOUS at 09:37

## 2017-08-08 RX ADMIN — ACETAMINOPHEN 650 MG: 325 SOLUTION ORAL at 20:07

## 2017-08-08 RX ADMIN — KETOROLAC TROMETHAMINE 30 MG: 30 INJECTION, SOLUTION INTRAMUSCULAR at 13:28

## 2017-08-08 NOTE — PLAN OF CARE
Problem: Goal Outcome Summary  Goal: Goal Outcome Summary  Outcome: Improving  intermittent nausea this shift. No flatus. Taking small amount of clear liquid. voiding clear ira urine.  Po analgesic started.  encouraged walking.  fiance here and ambulating with her in halls.  Reviewing fluid log and post op information.

## 2017-08-08 NOTE — PLAN OF CARE
Problem: Goal Outcome Summary  Goal: Goal Outcome Summary  Outcome: No Change  Patient A&O. VSS on RA, temp max of 99.1. Pain managed with PCA and scheduled Toradol. BS active. LS clear. Dressings CDI ex dried drainage. Scabbing on abdomen. Tolerating ice chips. CMS intact. Denies nausea.

## 2017-08-08 NOTE — PLAN OF CARE
Problem: Goal Outcome Summary  Goal: Goal Outcome Summary  Outcome: No Change  VSS x4. Stood at bedside Ax1. Pain managed with PCA and scheduled Toradol. BS hypoactive. LS clear. Dressings CDI ex dried drainage. Scabbing on abdomen. Tolerating ice chips. CMS intact. VSS on RA. Denies nausea. IS to 1600.

## 2017-08-08 NOTE — CONSULTS
"NUTRITION EDUCATION    REASON FOR ASSESSMENT:  Bariatric Surgery Consult    CURRENT DIET:  Bariatric Clear Liquids    NUTRITION HISTORY:  Patient worked with clinical dietitian in [facility] prior to surgery to assist with lifestyle modification.    ANTHROPOMETRICS:   Ht: 5'7\"  Wt: 132.9 kg  BMI: 45. 89 kg/m2  IBW: 61.4 kg  %IBW: 216%    ASSESSED NUTRITION NEEDS:  Energy Needs: 6700-8557 kcals/day (11 - 14 kcal/kg ABW)                      Protein Needs: 61-92 g/day (1 - 1.5 gm/kg IBW)  Fluid Needs: 3474-5644 mL/day (1mL/kcal/ABW)    Know patient will not meet assessed needs due to the restrictive nature of surgery.    NUTRITION DIAGNOSIS:   Food- and nutrition related knowledge deficit related to bariatric surgery as evidence by Laparoscopic Sleeve Gastrectomy surgery on 08/07/2017.    INTERVENTIONS:    Nutrition Prescription:    Recommended patient follow bariatric diet advancement for Laparoscopic Sleeve Gastrectomy    Implementation:    Nutrition Education (Content):  a) Reviewed Laparoscopic Sleeve Gastrectomy diet guidelines  b) Provided handouts:  Nutrition After Laparoscopic Sleeve Gastrectomy and Vitamin and Mineral Supplements After Weight Loss Surgery    Nutrition Education (Application):  c) Discussed current eating habits and recommended alternative food choices  d) Patient verbalizes understanding of diet by listing appropriate foods for home    Anticipate good compliance    Diet Education - refer to Education Flowsheet    Goals:    Patient will follow bariatric diet advancement schedule    Patient will follow post-operative vitamin mineral schedule      Follow Up:    Patient to follow up in 2 weeks with RD in [facility]    Provided RD contact information for future questions        Iman Alberto RD, LD  Clinical Dietitian    "

## 2017-08-08 NOTE — PROGRESS NOTES
"Bariatric Surgery Progress Note         Assessment:      Clarissa Paulino is a 31 year old female S/P laparoscopic gastric sleeve, POD #1   Morbid Obesity, BMI >40            Plan:   UGI this morning WNL:    - Start water now  - Clears at lunch  - Fulls tomorrow morning    - D/C PCA when tolerating PO and start liquid pain medication. Can have 1 more dose of toradol today for a total of 4 doses  - Antiemetics PRN N/V  - Lovenox and PCDs.  Ambulate at least QID  - Work on incentive spirometer      Dispo: Likely home tomorrow if continues to progress        Interval History:   Afebrile overnight, VSS. Electrolytes and Hgb WNL, no evidence of blood loss. Moreno was removed this morning, patient is about to get up and walk in the halls. States she feels well today, a little sore. Toradol very helpful, has also been using dilaudid PCA. Had some intermittent nausea yesterday after surgery, resolved.         Physical Exam:   /81 (BP Location: Right arm)  Pulse 79  Temp 98.3  F (36.8  C) (Oral)  Resp 16  Ht 1.702 m (5' 7\")  Wt 132.9 kg (293 lb)  LMP 07/05/2017  SpO2 96%  BMI 45.89 kg/m2  I/O last 3 completed shifts:  In: 4627 [P.O.:50; I.V.:4577]  Out: 925 [Urine:900; Blood:25]  General: NAD, pleasant, alert and oriented x3; sitting up on edge of bed  Abdomen: soft, obese, with binder on, nondistended, minimally tender to palpation  Incisions: clean, dry and intact with steris and bandaids in place      Labs:          Platelet count   Result Value Ref Range    Platelet Count 203 150 - 450 10e9/L   Creatinine   Result Value Ref Range    Creatinine 0.60 0.52 - 1.04 mg/dL    GFR Estimate >90  Non  GFR Calc   >60 mL/min/1.7m2    GFR Estimate If Black >90   GFR Calc   >60 mL/min/1.7m2   Hemoglobin   Result Value Ref Range    Hemoglobin 12.5 11.7 - 15.7 g/dL   Electrolyte panel   Result Value Ref Range    Sodium 140 133 - 144 mmol/L    Potassium 3.5 3.4 - 5.3 mmol/L    Chloride 105 94 " - 109 mmol/L    Carbon Dioxide 27 20 - 32 mmol/L    Anion Gap 8 3 - 14 mmol/L   XR Upper GI Water Soluble: Gastric Sleeve    Narrative    UPPER GI WATER SOLUBLE   8/8/2017 8:36 AM     HISTORY: Evaluate for leak.    COMPARISON: None.    TECHNIQUE: Water-soluble contrast given orally. 14 spot images  obtained in AP and lateral projections. Fluoroscopy time 0.3 minutes.      Impression    IMPRESSION: Esophagus is patent. There is rapid passage of contrast  into the gastric remnant. No evidence of leak. Contrast passes readily  into the small bowel.         Layla Solomonkathia  [facility]: [phone]

## 2017-08-08 NOTE — OP NOTE
Surgeon: Sloan Burnette MD.  1st Assistant: Blanca Hernandez PA-C, The physicians assistant was medically necessary for their expertise in camera management, suctioning, suturing, and retraction.    PREOPERATIVE DIAGNOSES:   1. Morbid obesity. Mutiple comorbidities  POSTOPERATIVE DIAGNOSES:   1. Morbid obesity.   OPERATIVE PROCEDURE: Laparoscopic sleeve gastrectomy for morbid obesity.   ANESTHESIA: General.   ESTIMATED BLOOD LOSS: Less than 5 mL.   OPERATIVE PROCEDURE: After induction of general endotracheal anesthesia, the abdomen was prepped and draped in the usual sterile fashion. I attempted insufflation With a Veress needle in the supraumbilical and infraumbilical sites. It did not feel convincing. I then used an optiview technique through a left supraumbilical site and pneumoperitoneum was established with low pressures. A 5mm trocar was introduced.  Initial survey of the abdomen showed a normal appearance to the liver and omentum. A 15 mm trocar was placed in the right mid abdomen, a 5 mm trocar was placed in the left mid abdomen, and a 5 mm trocar was placed in the left flank, and a 5mm trocar in the right subxiphoid position. A Annemarie retractor was placed through a subxiphoid incision and used to elevate the left lobe of the liver anterior and to the right.  We then took down the angle of His with LigaSure and blunt dissection. This was followed by evaluation of the pylorus and shoaib the greater curve of the stomach 6 cm proximal to the pylorus. We then took down the short gastric vessels with LigaSure device all the way from our shoaib 6 cm proximal to the pylorus up to the GE junction. Once the stomach was mobilized, we ensured no posterior adhesions to the pancreas were inhibiting adequate positioning of the stomach. At this point, a 40-Frisian orogastric bougie was advanced into the patient's stomach and down into the duodenum and utilized to gauge the size of our sleeve gastrectomy. We start firing Two Black  60 Endo-AME staplers which had incorporated buttressing material from the 6 cm shoaib, proximal to the pylorus all the way up to the GE junction. At first we utilized black loads, later on purple loads with suture line reinforcement. With each load we tightened the load against the bougie, had the bougie moved slightly out and in without resistance, and then fired the load.  We ensured no twisting of the sleeve, good size of the sleeve and hemostatic and secure apposition of staples.  Staple line from top to bottom was sewn with 2-0 Vicryl, attaching the adjacent omentum to the staple line. Once the resection was completed, the amputated stomach was removed from the patient's abdomen and sent to pathology. Gallbladder evaluated and found to be normal. A small area of splenic capsular bleeding was covered with NuKnit. Later observation of this area showed no further bleeding.   The abdomen was surveyed 1 more time no evidence of injuries noted. The 15  mm trocar in the right abdomen was removed under direct visualization without evidence of bleeding and closed with Gui-Pattie device using an 0 Vicryl suture. The remaining trocars were removed under direct visualization without evidence of bleeding. Pneumoperitoneum was released and skin was approximated in all port sites with 4-0 Vicryl. Steri-Strips and sterile dressing applied. No immediate complications. Sponge and needle count reported correct.     Sloan Burnette M.D.

## 2017-08-09 VITALS
DIASTOLIC BLOOD PRESSURE: 87 MMHG | TEMPERATURE: 98.6 F | RESPIRATION RATE: 16 BRPM | WEIGHT: 293 LBS | HEIGHT: 67 IN | OXYGEN SATURATION: 94 % | BODY MASS INDEX: 45.99 KG/M2 | HEART RATE: 73 BPM | SYSTOLIC BLOOD PRESSURE: 140 MMHG

## 2017-08-09 LAB — GLUCOSE BLDC GLUCOMTR-MCNC: 98 MG/DL (ref 70–99)

## 2017-08-09 PROCEDURE — 00000146 ZZHCL STATISTIC GLUCOSE BY METER IP

## 2017-08-09 PROCEDURE — 25000125 ZZHC RX 250: Performed by: PHYSICIAN ASSISTANT

## 2017-08-09 PROCEDURE — 25000132 ZZH RX MED GY IP 250 OP 250 PS 637: Performed by: PHYSICIAN ASSISTANT

## 2017-08-09 PROCEDURE — 25000128 H RX IP 250 OP 636: Performed by: PHYSICIAN ASSISTANT

## 2017-08-09 RX ORDER — OXYCODONE HCL 5 MG/5 ML
5-10 SOLUTION, ORAL ORAL EVERY 4 HOURS PRN
Qty: 320 ML | Refills: 0 | Status: SHIPPED | OUTPATIENT
Start: 2017-08-09 | End: 2017-08-21

## 2017-08-09 RX ORDER — ONDANSETRON 4 MG/1
4 TABLET, ORALLY DISINTEGRATING ORAL EVERY 6 HOURS PRN
Qty: 20 TABLET | Refills: 0 | Status: SHIPPED | OUTPATIENT
Start: 2017-08-09 | End: 2017-08-21

## 2017-08-09 RX ADMIN — SODIUM CHLORIDE, POTASSIUM CHLORIDE, SODIUM LACTATE AND CALCIUM CHLORIDE: 600; 310; 30; 20 INJECTION, SOLUTION INTRAVENOUS at 04:02

## 2017-08-09 RX ADMIN — ONDANSETRON 4 MG: 4 TABLET, ORALLY DISINTEGRATING ORAL at 03:40

## 2017-08-09 RX ADMIN — RANITIDINE 150 MG: 150 TABLET ORAL at 09:01

## 2017-08-09 RX ADMIN — ACETAMINOPHEN 650 MG: 325 SOLUTION ORAL at 00:52

## 2017-08-09 RX ADMIN — ESCITALOPRAM 10 MG: 10 TABLET, FILM COATED ORAL at 09:01

## 2017-08-09 RX ADMIN — ENOXAPARIN SODIUM 40 MG: 40 INJECTION SUBCUTANEOUS at 09:01

## 2017-08-09 RX ADMIN — OXYCODONE HYDROCHLORIDE 10 MG: 5 SOLUTION ORAL at 09:28

## 2017-08-09 RX ADMIN — ACETAMINOPHEN 650 MG: 325 SOLUTION ORAL at 09:00

## 2017-08-09 RX ADMIN — OXYCODONE HYDROCHLORIDE 10 MG: 5 SOLUTION ORAL at 03:40

## 2017-08-09 NOTE — PLAN OF CARE
Problem: Goal Outcome Summary  Goal: Goal Outcome Summary  Outcome: No Change  A&O. VSS. CMS intact. Pain decreased with PRN tylenol, oxycodone. Lap sites CDI. Up Ind in stafford tolerating diet. BS improving. Denies flatus. Nausea reduced with PRN zofran x1. Endorses belching. Plan to d/c today.

## 2017-08-09 NOTE — DISCHARGE SUMMARY
"  Discharge Summary    Clarissa Paulino MRN# [id number]   YOB: 1985 Age: 31 year old     Date of Admission:  8/7/2017  Date of Discharge:  8/9/2017  Admitting Physician:  Sloan Burnette MD  Discharging Service:  Surgery  Primary Provider: Krista Landaverde         Admission Diagnosis:   Principle Diagnosis: Morbid Obesity Body mass index is 45.89 kg/(m^2).  Secondary Diagnosis: None         Procedures:   Procedure(s):  LAPAROSCOPIC GASTRIC SLEEVE         Brief History of Illness:   This patient was a 31 year old female who presented to the [facility] for potential bariatric surgery.  After pre-op screening, discussing the risks, benefits, and possible complications, an informed consent was obtained and the patient underwent the above procedure.  Please see the Operative Report for full details.         Hospital Course:   The patient recovered as anticipated.  The patient was discharged to home in stable condition by the surgical service.  She verbalized understanding of all discharge instructions.  She was asked to call with any further questions or concerns.  Please see chart for details.          Consultations:   Consultation during this admission received from nutrition          Discharge Disposition:   Discharged to home          Condition on Discharge:   Discharge condition: Stable   Discharge vitals: Blood pressure 149/90, pulse 73, temperature 97.6  F (36.4  C), temperature source Oral, resp. rate 16, height 5' 7\" (1.702 m), weight 293 lb (132.9 kg), last menstrual period 07/05/2017, SpO2 96 %, not currently breastfeeding.           Discharge Medications:     Current Discharge Medication List      START taking these medications    Details   ondansetron (ZOFRAN-ODT) 4 MG ODT tab Take 1 tablet (4 mg) by mouth every 6 hours as needed for nausea or vomiting  Qty: 20 tablet, Refills: 0    Associated Diagnoses: Morbid obesity with body mass index of 40.0-44.9 in adult (H) "      oxyCODONE (ROXICODONE) 5 MG/5ML solution Take 5-10 mLs (5-10 mg) by mouth every 4 hours as needed for moderate to severe pain  Qty: 320 mL, Refills: 0    Associated Diagnoses: Morbid obesity with body mass index of 40.0-44.9 in adult (H)      ranitidine (ZANTAC) 150 MG tablet Take 1 tablet (150 mg) by mouth 2 times daily  Qty: 60 tablet, Refills: 2    Associated Diagnoses: Morbid obesity with body mass index of 40.0-44.9 in adult (H)         CONTINUE these medications which have NOT CHANGED    Details   Escitalopram Oxalate (LEXAPRO PO) Take 10 mg by mouth daily      calcium citrate-vitamin D (CITRACAL) 315-250 MG-UNIT TABS per tablet Take 2 tablets by mouth 2 times daily       VITAMIN D, CHOLECALCIFEROL, PO Take 2,000 Units by mouth daily      Prenatal Multivit-Min-Fe-FA (PRENATAL VITAMINS PO) Take 1 tablet by mouth daily          STOP taking these medications       OMEPRAZOLE PO Comments:   Reason for Stopping:                    Discharge Instructions:   Follow up at the [facility] next week as scheduled.  Please call   the clinic if you have any questions or concerns.  Follow up with your PCP   in 4-6 weeks.  Continue to ambulate and use your incentive spirometer at   home.  Do not take NSAIDs (such as ibuprofen, naproxen, or aspirin).  You   may hold your vitamins/supplements if you are having nausea, try to resume   your chewable vitamin if you are able to.  You may take a chewable calcium   in place of the tablet for 1-2 months.                  After Care Instructions     Activity       Your activity upon discharge: activity as tolerated. No heavy lifting > 20 lbs or strenuous exercise x 2-3 weeks. No driving or alcohol while on pain meds.            Diet       Follow this diet upon discharge: bariatric fulls            Wound care and dressings       Instructions to care for your wound at home: keep wound(s) clean and dry. You may shower, but do not soak incisions x 2 weeks. Leave steri strips  in place, they will fall off on their own. Apply dry dressing as needed.                      Blanca Hernandez PA-C, dictating on behalf of Sloan Burnette MD  Office #: [phone]

## 2017-08-09 NOTE — PLAN OF CARE
Problem: Goal Outcome Summary  Goal: Goal Outcome Summary  Outcome: No Change  VSS. Pt reported increasing gas pain, experienced relief with simethicone and pain meds. Incisions intact with adhesive strips. Ambulating independently in stafford. Voiding adequate amounts. Tolerating bariatric clears denies nausea. Denies flatus, belching present. Plan to d/c tomorrow.

## 2017-08-09 NOTE — PROGRESS NOTES
"[facility]  Bariatric Surgery Progress Note    Admission Date: 2017         Assessment and Plan:   Clarissa Paulino is a 31 year old female S/P Procedure(s):  LAPAROSCOPIC GASTRIC SLEEVE, 2 Days Post-Op.     - Continue bariatric fulls  - Encourage ambulation and IS  - Shower  - Home later today, will give Zofran  - DC instructions discussed             Interval History:   Some nausea, not sure if its related to pain meds or food, relieved with Zofran, tolerating diet, pain controlled with PO meds, UO adequate, ambulating, + flatus. Meds reviewed.                     Physical Exam:   Blood pressure 149/90, pulse 73, temperature 97.6  F (36.4  C), temperature source Oral, resp. rate 16, height 5' 7\" (1.702 m), weight 293 lb (132.9 kg), last menstrual period 2017, SpO2 96 %, not currently breastfeeding.  Temperature Temp  Av.7  F (36.5  C)  Min: 96.6  F (35.9  C)  Max: 98.2  F (36.8  C)   I/O last 3 completed shifts:  In: 3038 [P.O.:820; I.V.:2218]  Out: 2350 [Urine:2350]  Constitutional:  Awake, alert, oriented, and in no apparent distress.   Lungs: No increased work of breathing, good air exchange, clear to auscultation bilaterally, and no crackles or wheezing.   Cardiovascular: Regular rate and rhythm, normal S1 and S2, and no murmur noted.   Abdomen: Soft, non-distended, appropriately tender at incision(s), + BS. Binder present.    Wounds: Clean, dry, and intact.  No erythema or drainage.    Extremities: No edema or calf tenderness. +SCDs.          Data:     Hemoglobin   Date Value Ref Range Status   2017 12.5 11.7 - 15.7 g/dL Final   ]       Recent Labs  Lab 17  0623   BGM 98       Blanca Hernandez PA-C  [facility]  [phone]  "

## 2017-08-10 ENCOUNTER — TELEPHONE (OUTPATIENT)
Dept: FAMILY MEDICINE | Facility: CLINIC | Age: 32
End: 2017-08-10

## 2017-08-10 DIAGNOSIS — E66.01 MORBID OBESITY WITH BODY MASS INDEX OF 40.0-44.9 IN ADULT (H): ICD-10-CM

## 2017-08-10 NOTE — TELEPHONE ENCOUNTER
Patient discharged from [facility] IP  ( Inpatient or ER).    Discharge location: [facility]  Discharge date: 8/9/17  Diagnosis: Morbid, Morbid Obesity (H)  Patient has been in the ER/IP 0/1 times.  Care Coord:  NA  Please follow up as appropriate. If no follow up required, please close encounter.

## 2017-08-11 RX ORDER — ONDANSETRON 4 MG/1
4 TABLET, ORALLY DISINTEGRATING ORAL EVERY 6 HOURS PRN
Qty: 20 TABLET | Refills: 0 | Status: CANCELLED | OUTPATIENT
Start: 2017-08-11

## 2017-08-11 NOTE — TELEPHONE ENCOUNTER
"ED/Discharge Protocol    \"Hi, my name is Swapna Dumas, a registered nurse, and I am calling on behalf of Dr. Landaverde's office at [place].  I am calling to follow up and see how things are going for you after your recent visit.\"    \"I see that you were in the (ER/UC/IP) on IP.    How are you doing now that you are home?\" good able to keep up on walks and drinking    Is patient experiencing symptoms that may require a hospital visit?  no    Discharge Instructions    \"Let's review your discharge instructions.  What is/are the follow-up recommendations?  Pt. Response: PRN    \"Were you instructed to make a follow-up appointment?\"  Pt. Response: No.       \"When you see the provider, I would recommend that you bring your discharge instructions with you.    Medications    \"How many new medications are you on since your hospitalization/ED visit?\"    2 or more - Ranitidine 150 mg BID; Oxycodone 5-10 mg every 4 hours as needed for pain ; multiple vitamins and will bring them in when she comes  \"How many of your current medicines changed (dose, timing, name, etc.) while you were in the hospital/ED visit?\"   0-1 D/C Omeprozole  \"Do you have questions about your medications?\"   No  \"Were you newly diagnosed with heart failure, COPD, diabetes or did you have a heart attack?\"   No  For patients on insulin: \"Did you start on insulin in the hospital or did you have your insulin dose changed?\"   No    Medication reconciliation completed? Yes    Was MTM referral placed (*Make sure to put transitions as reason for referral)?   No    Call Summary    \"Do you have any questions or concerns about your condition or care plan at the moment?\"    No        \"If you have questions or things don't continue to improve, we encourage you contact us through the main clinic number,  [phone].  Even if the clinic is not open, triage nurses are available 24/7 to help you.     We would like you to know that our clinic has extended hours (provide " "information).  We also have urgent care (provide details on closest location and hours/contact info)\"      \"Thank you for your time and take care!\"      "

## 2017-08-15 ENCOUNTER — OFFICE VISIT (OUTPATIENT)
Dept: SURGERY | Facility: CLINIC | Age: 32
End: 2017-08-15
Payer: COMMERCIAL

## 2017-08-15 VITALS
HEART RATE: 93 BPM | OXYGEN SATURATION: 96 % | WEIGHT: 284.2 LBS | RESPIRATION RATE: 14 BRPM | SYSTOLIC BLOOD PRESSURE: 123 MMHG | BODY MASS INDEX: 44.51 KG/M2 | TEMPERATURE: 97.6 F | DIASTOLIC BLOOD PRESSURE: 80 MMHG

## 2017-08-15 DIAGNOSIS — E66.01 MORBID OBESITY (H): Primary | ICD-10-CM

## 2017-08-15 DIAGNOSIS — Z98.84 BARIATRIC SURGERY STATUS: ICD-10-CM

## 2017-08-15 DIAGNOSIS — E66.01 MORBID OBESITY WITH BODY MASS INDEX OF 40.0-44.9 IN ADULT (H): Primary | ICD-10-CM

## 2017-08-15 PROCEDURE — 99207 ZZC NO CHARGE NURSE ONLY: CPT

## 2017-08-15 PROCEDURE — 99024 POSTOP FOLLOW-UP VISIT: CPT | Performed by: PHYSICIAN ASSISTANT

## 2017-08-15 RX ORDER — CALCIUM CARBONATE 500 MG/1
1 TABLET, CHEWABLE ORAL
COMMUNITY
End: 2017-08-21

## 2017-08-15 NOTE — MR AVS SNAPSHOT
After Visit Summary   8/15/2017    Clarissa Paulino    MRN: [id number]           Patient Information     Date Of Birth          1985        Visit Information        Provider Department      8/15/2017 1:20 PM Dallas San PA-C [facility]      Today's Diagnoses     Morbid obesity with body mass index of 40.0-44.9 in adult (H)    -  1    Bariatric surgery status          Care Instructions    PLAN:   Restart taking omeprazole 20 mg daily.  Use zantact 150 mg as needed.  Sleep with pillows and avoid drinking/eating within 1 hour of bedtime.  If worse contact clinic  Also seek medical attention if chest heaviness is worse.    Strive to drink 64 oz of fluid daily.  Strive to move hourly while awake to decrease risk of developing a blood clot.  Continue to do deep breathing exercises twice daily or use your spirometer.  Follow up with your primary care provider to discuss obesity related conditions by one month post op.   Start recommended postoperative vitamins within in the first 6 weeks.  Advance diet per Dietitian at your 2 week appointment.   Make follow up appointment to meet with psychologist at 1 and 3 months post operatively.   Wear binder to support abdominal muscles and gradually wean off over the next few weeks.   Return to clinic for 2 wk post op appointment and bring post op vitamins along.  Call with questions or concerns at any time          Follow-ups after your visit        Your next 10 appointments already scheduled     Aug 21, 2017 10:00 AM CDT   Post Op with Hannah Mays Rn, RN   [facility] ([facility])    [address] [place] [address]   [phone]            Aug 21, 2017 10:30 AM CDT   Post Op with Hannah Mays Diet 1, RD   [facility] ([facility])    [address] [place] [address]  [place] [id number]  [place] [address] [phone]            Sep 07, 2017  9:00 AM CDT   Post Op with Hannah Mays Diet 3, RD   [facility] ([facility])    [address]  [place] [address] [phone]              Who to contact     If you have questions or need follow up information about today's clinic visit or your schedule please contact [facility] directly at [phone].  Normal or non-critical lab and imaging results will be communicated to you by [employer], letter or phone within 4 business days after the clinic has received the results. If you do not hear from us within 7 days, please contact the clinic through [employer] or phone. If you have a critical or abnormal lab result, we will notify you by phone as soon as possible.  Submit refill requests through [employer] or call your pharmacy and they will forward the refill request to us. Please allow 3 business days for your refill to be completed.          Additional Information About Your Visit        MyChart Information     [employer] gives you secure access to your electronic health record. If you see a primary care provider, you can also send messages to your care team and make appointments. If you have questions, please call your primary care clinic.  If you do not have a primary care provider, please call [phone] and they will assist you.        Care EveryWhere ID     This is your Care EveryWhere ID. This could be used by other organizations to access your [facility] [facility] medical records  [id number]        Your Vitals Were     Pulse Temperature Respirations Last Period Pulse Oximetry Breastfeeding?    93 97.6  F (36.4  C) 14 07/05/2017 96% No    BMI (Body Mass Index)                   44.51 kg/m2            Blood Pressure from Last 3 Encounters:   08/15/17 123/80   08/09/17 140/87   07/26/17 116/78    Weight from Last 3 Encounters:    08/15/17 284 lb 3.2 oz (128.9 kg)   08/07/17 293 lb (132.9 kg)   08/04/17 294 lb 12.8 oz (133.7 kg)              Today, you had the following     No orders found for display       Primary Care Provider Office Phone # Fax #    Krista Landaverde [phone]       [address]  [place] [facility] [address]        Equal Access to Services     [facility]: Hadii aad ku hadasho Soomaali, waaxda luqadaha, qaybta kaalmada adeegyada, waxay idiin hayaan adeeg kharash la'portillon . So [facility] [phone].    ATENCIÓN: Si habla español, tiene a chirinos disposición servicios gratuitos de asistencia lingüística. [facility] [phone].    We comply with applicable federal civil rights laws and [place] laws. We do not discriminate on the basis of race, color, national origin, age, disability sex, sexual orientation or gender identity.            Thank you!     Thank you for choosing [facility]  for your care. Our goal is always to provide you with excellent care. Hearing back from our patients is one way we can continue to improve our services. Please take a few minutes to complete the written survey that you may receive in the mail after your visit with us. Thank you!             Your Updated Medication List - Protect others around you: Learn how to safely use, store and throw away your medicines at www.disposemymeds.org.          This list is accurate as of: 8/15/17  1:48 PM.  Always use your most recent med list.                   Brand Name Dispense Instructions for use Diagnosis    B-12-SL SL      Place 1 tablet under the tongue        calcium carbonate 500 MG chewable tablet    TUMS     Take 1 chew tab by mouth Taking 4 daily        calcium citrate-vitamin D 315-250 MG-UNIT Tabs per tablet    CITRACAL     Take 2 tablets by mouth 2 times daily        LEXAPRO PO      Take 10 mg by mouth daily        multivitamin  peds with iron 60 MG chewable tablet      Take 2 chew tab by mouth every  morning        ondansetron 4 MG ODT tab    ZOFRAN-ODT    20 tablet    Take 1 tablet (4 mg) by mouth every 6 hours as needed for nausea or vomiting    Morbid obesity with body mass index of 40.0-44.9 in adult (H)       oxyCODONE 5 MG/5ML solution    ROXICODONE    320 mL    Take 5-10 mLs (5-10 mg) by mouth every 4 hours as needed for moderate to severe pain    Morbid obesity with body mass index of 40.0-44.9 in adult (H)       ranitidine 150 MG tablet    ZANTAC    60 tablet    Take 1 tablet (150 mg) by mouth 2 times daily    Morbid obesity with body mass index of 40.0-44.9 in adult (H)       TYLENOL PO      Take 650 mg by mouth every 8 hours as needed for mild pain or fever        VITAMIN D (CHOLECALCIFEROL) PO      Take 2,000 Units by mouth daily        VITRON-C PO      Take 1 tablet by mouth every morning

## 2017-08-15 NOTE — MR AVS SNAPSHOT
After Visit Summary   8/15/2017    Clarissa Paulino    MRN: [id number]           Patient Information     Date Of Birth          1985        Visit Information        Provider Department      8/15/2017 1:00 PM Rn, Hannah Mays, RN [facility]      Today's Diagnoses     Morbid obesity (H)    -  1    Bariatric surgery status           Follow-ups after your visit        Your next 10 appointments already scheduled     Aug 21, 2017 10:00 AM CDT   Post Op with Hannah Mays Rn, RN   [facility] ([facility])    [address] [place] [address]   [phone]            Aug 21, 2017 10:30 AM CDT   Post Op with Hannah Mays Diet 1, RD   [facility] ([facility])    [address] [place] [address] [phone]            Sep 07, 2017  9:00 AM CDT   Post Op with Hannah Mays Diet 3, RD   [facility] ([facility])    [address] [place] [address] [phone]              Who to contact     If you have questions or need follow up information about today's clinic visit or your schedule please contact [facility] directly at [phone].  Normal or non-critical lab and imaging results will be communicated to you by MyChart, letter or phone within 4 business days after the clinic has received the results. If you do not hear from us within 7 days, please contact the clinic through MyChart or phone. If you have a critical or abnormal lab result, we will notify you by phone as soon as possible.  Submit refill requests through [employer] or call your pharmacy and they will forward the refill request to us. Please allow 3 business days for your refill to be completed.          Additional  Information About Your Visit        [employer]ell[employer] Information     [employer] gives you secure access to your electronic health record. If you see a primary care provider, you can also send messages to your care team and make appointments. If you have questions, please call your primary care clinic.  If you do not have a primary care provider, please call [phone] and they will assist you.        Care EveryWhere ID     This is your Care EveryWhere ID. This could be used by other organizations to access your [facility] medical records  [id number]        Your Vitals Were     Last Period                   07/05/2017            Blood Pressure from Last 3 Encounters:   08/15/17 123/80   08/09/17 140/87   07/26/17 116/78    Weight from Last 3 Encounters:   08/15/17 284 lb 3.2 oz (128.9 kg)   08/07/17 293 lb (132.9 kg)   08/04/17 294 lb 12.8 oz (133.7 kg)              Today, you had the following     No orders found for display       Primary Care Provider Office Phone # Fax #    Krista Landaverde [phone]       [address] [facility] [address]        Equal Access to Services     [facility]: Hadii aad ku hadguanacoo Soeusebio, waaxda luqadaha, qaybta kaalmada adeegyada, soren davila . So [facility] [phone].    ATENCIÓN: Si habla español, tiene a chirinos disposición servicios gratuitos de asistencia lingüística. [facility] [phone].    We comply with applicable federal civil rights laws and [place] laws. We do not discriminate on the basis of race, color, national origin, age, disability sex, sexual orientation or gender identity.            Thank you!     Thank you for choosing [facility]  for your care. Our goal is always to provide you with excellent care. Hearing back from our patients is one way we can continue to improve our services. Please take a few minutes to complete the written survey that you may receive in the mail after your visit  with us. Thank you!             Your Updated Medication List - Protect others around you: Learn how to safely use, store and throw away your medicines at www.disposemymeds.org.          This list is accurate as of: 8/15/17  1:51 PM.  Always use your most recent med list.                   Brand Name Dispense Instructions for use Diagnosis    B-12-SL SL      Place 1 tablet under the tongue        calcium carbonate 500 MG chewable tablet    TUMS     Take 1 chew tab by mouth Taking 4 daily        calcium citrate-vitamin D 315-250 MG-UNIT Tabs per tablet    CITRACAL     Take 2 tablets by mouth 2 times daily        LEXAPRO PO      Take 10 mg by mouth daily        multivitamin  peds with iron 60 MG chewable tablet      Take 2 chew tab by mouth every morning        ondansetron 4 MG ODT tab    ZOFRAN-ODT    20 tablet    Take 1 tablet (4 mg) by mouth every 6 hours as needed for nausea or vomiting    Morbid obesity with body mass index of 40.0-44.9 in adult (H)       oxyCODONE 5 MG/5ML solution    ROXICODONE    320 mL    Take 5-10 mLs (5-10 mg) by mouth every 4 hours as needed for moderate to severe pain    Morbid obesity with body mass index of 40.0-44.9 in adult (H)       ranitidine 150 MG tablet    ZANTAC    60 tablet    Take 1 tablet (150 mg) by mouth 2 times daily    Morbid obesity with body mass index of 40.0-44.9 in adult (H)       TYLENOL PO      Take 650 mg by mouth every 8 hours as needed for mild pain or fever        VITAMIN D (CHOLECALCIFEROL) PO      Take 2,000 Units by mouth daily        VITRON-C PO      Take 1 tablet by mouth every morning

## 2017-08-15 NOTE — PATIENT INSTRUCTIONS
PLAN:   Restart taking omeprazole 20 mg daily.  Use zantact 150 mg as needed.  Sleep with pillows and avoid drinking/eating within 1 hour of bedtime.  If worse contact clinic  Also seek medical attention if chest heaviness is worse.    Strive to drink 64 oz of fluid daily.  Strive to move hourly while awake to decrease risk of developing a blood clot.  Continue to do deep breathing exercises twice daily or use your spirometer.  Follow up with your primary care provider to discuss obesity related conditions by one month post op.   Start recommended postoperative vitamins within in the first 6 weeks.  Advance diet per Dietitian at your 2 week appointment.   Make follow up appointment to meet with psychologist at 1 and 3 months post operatively.   Wear binder to support abdominal muscles and gradually wean off over the next few weeks.   Return to clinic for 2 wk post op appointment and bring post op vitamins along.  Call with questions or concerns at any time

## 2017-08-15 NOTE — PROGRESS NOTES
"[facility]   1 Week Surgical Follow-Up     PCP:  Krista Landaverde    DOS: 08/07/17  Surgeon: PLB  Surgery Type: Sleeve  HISTORY OF PRESENT ILLNESS:  Clarissa Paulino returns today for her follow-up appointment status post bariatric surgery.  She is currently using Tylenol for pain medication.  Took about 1300 mg yesterday and none so far today.  Refill Needed: No.  Patient's Pain Scale: 2. The pain is located on abdomen.  Patient's current daily fluid intake in oz is: 64 oz. daily mostly water, Powerade Zero, Premier Protein at meals.  Patient has started postoperative Vitamins: Yes.  Does report some chest heaviness that started last night made worse with lying on her back.  Better today but has a mild amount.  Also is reporting some heartburn symptoms. Able to feel in the back of her throat.  Does say she is sleeping with pillows.  Denies any fever/chills. No cough.    Activity:   Activity: walking inside and outside 50-50 \" daily  REVIEW OF SYSTEMS:  GI:    Nausea: No  Vomiting: No  Diarrhea: No  Constipation: No  Last BM: this morning formed and little bit dark brown  Dysphagia: No  GERD: No    CV/Pulmonary:   Chest Pain: No  Dizzy: No  Light Headed: No  SOB: No Using IS more than 4 times daily getting over 1500.   Endo:  Diabetes: No  :    Contraception: Condoms and birth control - will start with next cycle  Dysuria: No  Vascular:    LE Edema: No  PHYSICAL EXAMINATION:    /80 (BP Location: Left arm, Patient Position: Chair, Cuff Size: Adult Large)  Pulse 93  Temp 97.6  F (36.4  C)  Resp 14  Wt 284 lb 3.2 oz (128.9 kg)  LMP 07/05/2017  SpO2 96%  Breastfeeding? No  BMI 44.51 kg/m2    GENERAL: No acute distress. Alert and oriented time 3.  HEART: Regular rate and rhythm.  LUNGS:  Clear to auscultation bilaterally.  ABDOMEN: Soft, incisions clean,dry, and intact. Tenderness WNL for post op.  EXTREMITIES: No lower extremity edema bilaterally. No calf swelling or tenderness. Negative " criss's  SKIN: No rashes.  PSYCHOLOGICAL: Stable. Pleasant    ASSESSMENT:    1. S/P bariatric surgery.  2. Post surgical malabsorption  3. heartburn    PLAN:   Restart taking omeprazole 20 mg daily.  Use zantact 150 mg as needed.  Sleep with pillows and avoid drinking/eating within 1 hour of bedtime.  If worse contact clinic  Also seek medical attention if chest heaviness is worse.    Strive to drink 64 oz of fluid daily.  Strive to move hourly while awake to decrease risk of developing a blood clot.  Continue to do deep breathing exercises twice daily or use your spirometer.  Follow up with your primary care provider to discuss obesity related conditions by one month post op.   Start recommended postoperative vitamins within in the first 6 weeks.  Advance diet per Dietitian at your 2 week appointment.   Make follow up appointment to meet with psychologist at 1 and 3 months post operatively.   Wear binder to support abdominal muscles and gradually wean off over the next few weeks.   Return to clinic for 2 wk post op appointment and bring post op vitamins along.  Call with questions or concerns at any time.

## 2017-08-21 ENCOUNTER — OFFICE VISIT (OUTPATIENT)
Dept: SURGERY | Facility: CLINIC | Age: 32
End: 2017-08-21
Payer: COMMERCIAL

## 2017-08-21 VITALS
BODY MASS INDEX: 44.56 KG/M2 | WEIGHT: 284.5 LBS | OXYGEN SATURATION: 98 % | RESPIRATION RATE: 14 BRPM | HEART RATE: 68 BPM | SYSTOLIC BLOOD PRESSURE: 108 MMHG | DIASTOLIC BLOOD PRESSURE: 72 MMHG | TEMPERATURE: 98.3 F

## 2017-08-21 DIAGNOSIS — Z98.84 BARIATRIC SURGERY STATUS: ICD-10-CM

## 2017-08-21 DIAGNOSIS — E66.01 MORBID OBESITY (H): Primary | ICD-10-CM

## 2017-08-21 PROCEDURE — 99207 ZZC NO CHARGE NURSE ONLY: CPT

## 2017-08-21 PROCEDURE — 97803 MED NUTRITION INDIV SUBSEQ: CPT | Performed by: DIETITIAN, REGISTERED

## 2017-08-21 NOTE — PROGRESS NOTES
"BARIATRIC PROGRESS NOTE - 2 Week Post Op  DATE OF VISIT: 8/21/17    Clarissa Paulino  1985  female  [id number]  31 year old    ASSESSMENT:    REASON FOR VISIT:  Clarissa Paulino is a 31 year old year old female presents today for a 2 Week Post Op nutrition follow-up appointment. Patient is accompanied by self.     DIAGNOSIS:  Status: post gastric sleeve surgery.  Obesity:  Obesity Grade III BMI >40    ANTHROPOMETRICS:  Height:  67\"  Initial weight: 298.4 lb  Current Weight: 129 kg (284 lb 8 oz)  BMI: 44.56  kg/(m^2).    VITAMINS AND MINERALS:   2 Multivitamin with Minerals-morning  600 mg Calcium With Vitamin D BID-two hours away from multivitamin/ mineral and Vitron C  2000 International units Vitamin D  1000 mcg Vitamin B-12 sublingual  1 Vitron C    NUTRITION HISTORY:  Tolerating diet: Bariatric full liquid diet  Fluids/water intake: 60 ounces/day  Milk intake: 0 ounces/day  Small bites: Yes  Portion size: 1/2-1 cup  20-30 minute meals: Yes  Fluids and meals  by 30 minutes: Yes  Chew foods thoroughly: Yes  Breakfast: sugar free pudding or gelatin or Protein drink with 30 g protein  Lunch: 1/3-1/2 cup blended soup  Dinner: 1/2 cup Greek yogurt  Snacks: drinkable yogurt/ 1.5 protein drinks  Nausea: none  Vomiting: none  Additional Information: has tried 1 bite of mashed potatoes and cottage cheese without any problems      PHYSICAL ACTIVITY:  Type: walking  Frequency: 7 days a week.  Duration: 45 minutes.    DIAGNOSIS:   Previous Nutrition Diagnosis: Altered gastrointestinal function related to alternation in gastrointestinal structure as evidenced by history of gastric sleeve.   No change    Previous Goals:  n/a    Current Nutrition Diagnosis: Altered gastrointestinal function related to alternation in gastrointestinal structure as evidenced by history of gastric sleeve.     INTERVENTION  Nutrition Prescription: Recommended bariatric puree diet.    Goals:  Start puree diet at day 14 post " op.  Continue MVI, calcium with vitamin D, vitamin B12, vitamin D, and  iron with vitamin C.  Aim for 60 to 90 grams protein.  Continue 48 to 64 oz of fluid per day.    Implementation:  Discussed transition to puree diet.  Emphasized importance of adequate protein.  Reviewed required vitamins and mineral supplements.  Verbalizes good understanding of surgery diet guidelines.  Assessed learning needs and learning preferences.      NUTRITION MONITORING AND EVALUATION:   Monitor diet tolerance and weight loss.      Anticipated Compliance: good  Follow Up: Continue to monitor patient closely regarding weight loss and diet.  At 4 weeks Post Op    TIME SPENT WITH PATIENT: 20 minutes.  Tod Quiñones RD, LD  [facility]  [phone]

## 2017-08-21 NOTE — MR AVS SNAPSHOT
After Visit Summary   8/21/2017    Clarissa Paulino    MRN: [id number]           Patient Information     Date Of Birth          1985        Visit Information        Provider Department      8/21/2017 10:00 AM Rn, Hannah Mays, RN [facility]      Today's Diagnoses     Morbid obesity (H)    -  1    Bariatric surgery status           Follow-ups after your visit        Your next 10 appointments already scheduled     Sep 07, 2017  9:00 AM CDT   Post Op with Hannah Mays Diet 3, RD   [facility] ([facility])    [address] [place] [phone]              Who to contact     If you have questions or need follow up information about today's clinic visit or your schedule please contact [facility] directly at [phone].  Normal or non-critical lab and imaging results will be communicated to you by Rupeetalkhart, letter or phone within 4 business days after the clinic has received the results. If you do not hear from us within 7 days, please contact the clinic through Rupeetalkhart or phone. If you have a critical or abnormal lab result, we will notify you by phone as soon as possible.  Submit refill requests through 9flats or call your pharmacy and they will forward the refill request to us. Please allow 3 business days for your refill to be completed.          Additional Information About Your Visit        MyChart Information     9flats gives you secure access to your electronic health record. If you see a primary care provider, you can also send messages to your care team and make appointments. If you have questions, please call your primary care clinic.  If you do not have a primary care provider, please call [phone] and they will assist you.        Care EveryWhere ID     This is your Care EveryWhere ID. This  could be used by other organizations to access your [place] medical records  [id number]        Your Vitals Were     Pulse Temperature Respirations Last Period Pulse Oximetry Breastfeeding?    68 98.3  F (36.8  C) 14 07/05/2017 98% No    BMI (Body Mass Index)                   44.56 kg/m2            Blood Pressure from Last 3 Encounters:   08/21/17 108/72   08/15/17 123/80   08/09/17 140/87    Weight from Last 3 Encounters:   08/21/17 284 lb 8 oz (129 kg)   08/15/17 284 lb 3.2 oz (128.9 kg)   08/07/17 293 lb (132.9 kg)              Today, you had the following     No orders found for display       Primary Care Provider Office Phone # Fax #    Krista Landaverde [phone]       [address] [place]  [facility] [address]        Equal Access to Services     [facility]: Hadii john lunsfordo Soeusebio, waaxda luqadaha, qaybta kaalmada ademaritayada, soren davila . So [facility] [phone].    ATENCIÓN: Si habla español, tiene a chirinos disposición servicios gratuitos de asistencia lingüística. Llame al [phone].    We comply with applicable federal civil rights laws and [place] laws. We do not discriminate on the basis of race, color, national origin, age, disability sex, sexual orientation or gender identity.            Thank you!     Thank you for choosing [facility]  for your care. Our goal is always to provide you with excellent care. Hearing back from our patients is one way we can continue to improve our services. Please take a few minutes to complete the written survey that you may receive in the mail after your visit with us. Thank you!             Your Updated Medication List - Protect others around you: Learn how to safely use, store and throw away your medicines at www.disposemymeds.org.          This list is accurate as of: 8/21/17  1:36 PM.  Always use your most recent med list.                   Brand Name Dispense Instructions for use  Diagnosis    B-12-SL SL      Place 1,000 mcg under the tongue daily        calcium citrate-vitamin D 315-250 MG-UNIT Tabs per tablet    CITRACAL     Take 2 tablets by mouth 2 times daily        LEXAPRO PO      Take 10 mg by mouth daily        multivitamin  peds with iron 60 MG chewable tablet      Take 2 chew tab by mouth every morning        OMEPRAZOLE PO      Take 20 mg by mouth daily        ranitidine 150 MG tablet    ZANTAC    60 tablet    Take 1 tablet (150 mg) by mouth 2 times daily    Morbid obesity with body mass index of 40.0-44.9 in adult (H)       TYLENOL PO      Take 650 mg by mouth every 8 hours as needed for mild pain or fever        VITAMIN D (CHOLECALCIFEROL) PO      Take 2,000 Units by mouth daily        VITRON-C PO      Take 1 tablet by mouth every morning

## 2017-08-21 NOTE — MR AVS SNAPSHOT
MRN:[id number]                      After Visit Summary   8/21/2017    Clarissa Paulino    MRN: [id number]           Visit Information        Provider Department      8/21/2017 10:30 AM 1, Hannah Pina, FER [facility]      Your next 10 appointments already scheduled     Sep 07, 2017  9:00 AM CDT   Post Op with Hannah Mays Diet 3, RD   [facility] ([facility])    [address] [place] [phone]              MyChart Information     Netlogon gives you secure access to your electronic health record. If you see a primary care provider, you can also send messages to your care team and make appointments. If you have questions, please call your primary care clinic.  If you do not have a primary care provider, please call [phone] and they will assist you.        Care EveryWhere ID     This is your Care EveryWhere ID. This could be used by other organizations to access your [facility] medical records  [id number]        Equal Access to Services     QIAN SANCHEZ : Hadii john lunsfordo Soeusebio, waaxda luqadaha, qaybta kaalmada adesantos, soren davila . So [facility] [phone].    ATENCIÓN: Si habla español, tiene a chirinos disposición servicios gratuitos de asistencia lingüística. Llame al [phone].    We comply with applicable federal civil rights laws and [place] laws. We do not discriminate on the basis of race, color, national origin, age, disability sex, sexual orientation or gender identity.

## 2017-08-21 NOTE — PROGRESS NOTES
"[facility]  2 Week Surgical Follow-Up     Current PCP:  Krista Landaverde  Surgeon: MIAN  HISTORY OF PRESENT ILLNESS:  Clarissa Paulino returns today for her follow-up appointment status post Surgery Type: Sleeve DOS: 08/07/17.  She is accompanied by Self. Her daily fluid intake in oz is: 60 oz. Powerade and water and Premier Pro 1.5 daily.  Feels well emotionally Yes. Discussed that if she doesn't feel well emotionally, she should discuss medication change with PCP. Patient verbalized understanding and is agreeable to plan.    Patient reports using: Alcohol - No     Cigarettes - No  Pain:    She is currently using Pain Meds: nothing since last Tuesday for pain relief. She rates her pain at a Pain Scale: 0 on the pain scale. Refill Needed: No  Activity:  The patient is considered a fall risk: No.  What are you doing for physical activity? Activity: waking inside and outside   How many days per week?  7 days  How many minutes per day?  about 30\" of unplanned activity daily.  Patient plans to use elliptical at home as well as T 25 DVD's and free weights.    Review of Systems:   GI:  Nausea: No   Vomiting: No Diarrhea: No .         Constipation: No   Patient's Last BM: Regular BM's every other day - LBM yesterday.   GERD: non with change to Omeprazole 20 mg daily.      Neuro/CV/Pulmonary:   Dizzy: No   Light Headed: No   SOB: No   Chest Pain: No   Vascular:    LE Edema: No  Marilia's Negative  :  Form of birth control is Contraception: Condoms and BCP  Symptoms of UTI:  Dysuria: No  Endo: Diabetes: No  BS check (freq): na                  Avg. BS Level: na  PHYSICAL EXAMINATION:    VITALS:  /72 (BP Location: Left arm, Patient Position: Chair, Cuff Size: Adult Large)  Pulse 68  Temp 98.3  F (36.8  C)  Resp 14  Wt 284 lb 8 oz (129 kg)  LMP 07/05/2017  SpO2 98%  Breastfeeding? No  BMI 44.56 kg/m2                    LUNGS:  clear to auscultation  ABDOMEN: Abdomen soft, non-tender. BS normal. No " masses, organomegaly  INCISION: dry and intact    MEDICATIONS/VITAMINS/ALLERGIES REVIEWED: Yes  ASA Hx: No    ASSESSMENT:    1.  DOS: 08/07/17 status post gastric sleeve surgery.    Steri strip removed?  Yes       PLAN:    Make follow up appointment to meet with psychologist  post operatively.   Follow up with your primary care provider to obesity related conditions by one month post op - this was done with a phone call last week.   Advance diet per Dietitian.   Strive to drink 64 oz of fluid daily.  Call with questions or concerns at any time.  Return to clinic in 4 weeks for nutrition visit.  Return to clinic postop month 3.    Guidelines provided re: how to increase activity/exercise?  Yes

## 2017-08-21 NOTE — PROGRESS NOTES
"Progress Notes  Encounter Date: 8/21/2017  Tod Quiñones, RD, LD   Nutrition      []Hide copied text  []Hover for attribution information  BARIATRIC PROGRESS NOTE - 2 Week Post Op  DATE OF VISIT: 8/21/17     Clarissa Paulino  1985  female  [id number]  31 year old     ASSESSMENT:     REASON FOR VISIT:  Clarissa Paulino is a 31 year old year old female presents today for a 2 Week Post Op nutrition follow-up appointment. Patient is accompanied by self.      DIAGNOSIS:  Status: post gastric sleeve surgery.  Obesity:  Obesity Grade III BMI >40     ANTHROPOMETRICS:  Height:  67\"  Initial weight: 298.4 lb  Current Weight: 129 kg (284 lb 8 oz)  BMI: 44.56  kg/(m^2).     VITAMINS AND MINERALS:   2 Multivitamin with Minerals-morning  600 mg Calcium With Vitamin D BID-two hours away from multivitamin/ mineral and Vitron C  2000 International units Vitamin D  1000 mcg Vitamin B-12 sublingual  1 Vitron C     NUTRITION HISTORY:  Tolerating diet: Bariatric full liquid diet  Fluids/water intake: 60 ounces/day  Milk intake: 0 ounces/day  Small bites: Yes  Portion size: 1/2-1 cup  20-30 minute meals: Yes  Fluids and meals  by 30 minutes: Yes  Chew foods thoroughly: Yes  Breakfast: sugar free pudding or gelatin or Protein drink with 30 g protein  Lunch: 1/3-1/2 cup blended soup  Dinner: 1/2 cup Greek yogurt  Snacks: drinkable yogurt/ 1.5 protein drinks  Nausea: none  Vomiting: none  Additional Information: has tried 1 bite of mashed potatoes and cottage cheese without any problems        PHYSICAL ACTIVITY:  Type: walking  Frequency: 7 days a week.  Duration: 45 minutes.     DIAGNOSIS:   Previous Nutrition Diagnosis: Altered gastrointestinal function related to alternation in gastrointestinal structure as evidenced by history of gastric sleeve.   No change     Previous Goals:  n/a     Current Nutrition Diagnosis: Altered gastrointestinal function related to alternation in gastrointestinal structure as evidenced by " history of gastric sleeve.      INTERVENTION  Nutrition Prescription: Recommended bariatric puree diet.     Goals:  Start puree diet at day 14 post op.  Continue MVI, calcium with vitamin D, vitamin B12, vitamin D, and  iron with vitamin C.  Aim for 60 to 90 grams protein.  Continue 48 to 64 oz of fluid per day.     Implementation:  Discussed transition to puree diet.  Emphasized importance of adequate protein.  Reviewed required vitamins and mineral supplements.  Verbalizes good understanding of surgery diet guidelines.  Assessed learning needs and learning preferences.        NUTRITION MONITORING AND EVALUATION:   Monitor diet tolerance and weight loss.        Anticipated Compliance: good  Follow Up: Continue to monitor patient closely regarding weight loss and diet.  At 4 weeks Post Op     TIME SPENT WITH PATIENT: 20 minutes.  Tod Quiñones RD, MURALI  [facility]  [phone]      Electronically signed by Tod Quiñones RD, MURALI at 8/21/2017  1:36 PM        Office Visit on 8/21/2017              Detailed Report

## 2017-09-07 ENCOUNTER — OFFICE VISIT (OUTPATIENT)
Dept: SURGERY | Facility: CLINIC | Age: 32
End: 2017-09-07
Payer: COMMERCIAL

## 2017-09-07 DIAGNOSIS — E66.01 MORBID OBESITY WITH BODY MASS INDEX OF 40.0-44.9 IN ADULT (H): ICD-10-CM

## 2017-09-07 DIAGNOSIS — Z98.84 BARIATRIC SURGERY STATUS: ICD-10-CM

## 2017-09-07 PROCEDURE — 97803 MED NUTRITION INDIV SUBSEQ: CPT | Performed by: DIETITIAN, REGISTERED

## 2017-09-07 NOTE — MR AVS SNAPSHOT
MRN:[id number]                      After Visit Summary   9/7/2017    Clarissa Paulino    MRN: [id number]           Visit Information        Provider Department      9/7/2017 9:00 AM 3, Sh Tabatha Diet, RD [facility]      Your next 10 appointments already scheduled     Nov 08, 2017  9:30 AM CST   Return Visit with Dallas San PA-C   [facility] ([facility])    [address] [place] [phone]            Nov 08, 2017 10:00 AM CST   Return Visit with Hannah Wl Diet 3, RD   [facility] ([facility])    [facility] [address] [phone]              MyChart Information     Breathing Buildingst gives you secure access to your electronic health record. If you see a primary care provider, you can also send messages to your care team and make appointments. If you have questions, please call your primary care clinic.  If you do not have a primary care provider, please call [phone] and they will assist you.        Care EveryWhere ID     This is your Care EveryWhere ID. This could be used by other organizations to access your [facility] medical records  [id number]        Equal Access to Services     QIAN SANCHEZ : Hadii john ocampo Soeusebio, waaxda luqadaha, qaybta kaalmada adeegyada, soren shelley. So [facility] [phone].    ATENCIÓN: Si habla español, tiene a chirinos disposición servicios gratuitos de asistencia lingüística. Llame al [phone].    We comply with applicable federal civil rights laws and [place] laws. We do not discriminate on the basis of race, color, national origin, age, disability sex, sexual orientation or gender identity.

## 2017-09-07 NOTE — PROGRESS NOTES
POST-OPERATIVE NUTRITION APPOINTMENT  DATE OF VISIT: 2017  Name: OLEGARIO SESAY  : 1985  Gender: Female  MRN: [id number]  Age: 31    ASSESSMENT:  REASON FOR VISIT:  OLEGARIO SESAY is a 31 year old Female presents today for 4-week post-op nutrition follow-up appointment.  DIAGNOSIS:  Status post Laparoscopic Gastric Sleeve surgery.  Obesity Class III  ANTHROPOMETRICS:  Height: 67 inches  Initial Weight: 298.4 lbs  Weight at last visit: 284 lbs   Current Weight: 277.7 lbs  BMI: 43.5 kg/m2  VITAMINS AND MINERALS:  2 Multivitamin with Minerals-morning 600 mg   Calcium With Vitamin D BID-two hours away from multivitamin/ mineral and Vitron C   2000 International units Vitamin D   1000 mcg Vitamin B-12 sublingual   1 Vitron C  NUTRITION HISTORY:  Breakfast: protein drink if nauseous OR eggs OR milk-soaked cereal   Lunch: soup, ground chicken, ground beef, mashed potatoes, toast just recently   Supper: (same as lunch)  Snacks: rare; spoonful or two of cottage cheese  Beverages:water (60-70oz), protein drinks, powerade zero  Consuming liquid calories: Protein supplements/shakes, Milk  Protein intake: 60-90 grams/day  Tolerate regular texture food: No  Any foods not tolerated details: ground beef, scrambled eggs at first (now okay)   Portion size: 1/2 - 1 cup  Take 30 minutes to consume each meal: Yes  Eat protein foods first: Yes  Fluids and meals separate by at least 30 minutes: Yes  Chew foods 20 plus times: Yes  Tolerating diet: bariatric pureed diet  Drinking high protein supplements/shakes: Yes  Consuming meals per day: 3  Consuming snacks per day: 1  Comment: Pt struggled with N/V as she transitioned to purees, believes it to be getting better. Has tried a few soft solids in the last two days with good tolerance.     PHYSICAL ACTIVITY:  Type: walking, elliptical  Frequency: 4-5 times a week  Duration (min): 40  DIAGNOSIS:  Previous Nutrition Diagnosis: Altered gastrointestinal function related to  alteration in gastrointestinal structure as evidenced by history of Laparoscopic Gastric Sleeve surgery.  Unchanged, modified below  Previous goals:  Start puree diet at day 14 post op. - met  Continue MVI, calcium with vitamin D, vitamin B12, vitamin D, and iron with vitamin C. - met  Aim for 60 to 90 grams protein. - met  Continue 48 to 64 oz of fluid per day. - met  Current Nutrition Diagnosis: Altered gastrointestinal function related to alteration in gastrointestinal structure as evidenced by history of Laparoscopic Gastric Sleeve   Unchanged  INTERVENTION:  Nutrition Prescription: Eat 3 meals a day at regular intervals. Consume 60-90 grams of protein daily. Follow post-surgical vitamins and minerals protocol.  Goals:  Aim for 3 food groups per meal   Aim for 10-15g of fiber   Continue supplementing with protein drinks to obtain goal of 60-90g/day  Implementation: Discussed progress toward previous goals; reinforced importance of following bariatric lifestyle changes  NUTRITION MONITORING AND EVALUATION:  Anticipated compliance: Good  Verbalized understanding by discussing the importance of variety in the diet after bariatric surgery    Follow up: At 3 months post op  Patient to follow up in 2 months.  TIME SPENT WITH PATIENT:  23 minutes  Iman Alberto RD, LD  Clinical Dietitian

## 2017-11-03 DIAGNOSIS — Z98.84 BARIATRIC SURGERY STATUS: ICD-10-CM

## 2017-11-03 DIAGNOSIS — K91.2 POSTSURGICAL MALABSORPTION: ICD-10-CM

## 2017-11-06 DIAGNOSIS — K91.2 POSTSURGICAL MALABSORPTION: ICD-10-CM

## 2017-11-06 DIAGNOSIS — Z98.84 BARIATRIC SURGERY STATUS: ICD-10-CM

## 2017-11-06 LAB
DEPRECATED CALCIDIOL+CALCIFEROL SERPL-MC: 57 UG/L (ref 20–75)
FERRITIN SERPL-MCNC: 150 NG/ML (ref 12–150)
IRON SATN MFR SERPL: 36 % (ref 15–46)
IRON SERPL-MCNC: 107 UG/DL (ref 35–180)
PTH-INTACT SERPL-MCNC: 24 PG/ML (ref 12–72)
TIBC SERPL-MCNC: 295 UG/DL (ref 240–430)
VIT B12 SERPL-MCNC: 688 PG/ML (ref 193–986)

## 2017-11-06 PROCEDURE — 36415 COLL VENOUS BLD VENIPUNCTURE: CPT | Performed by: PHYSICIAN ASSISTANT

## 2017-11-06 PROCEDURE — 83540 ASSAY OF IRON: CPT | Performed by: PHYSICIAN ASSISTANT

## 2017-11-06 PROCEDURE — 82306 VITAMIN D 25 HYDROXY: CPT | Performed by: PHYSICIAN ASSISTANT

## 2017-11-06 PROCEDURE — 83550 IRON BINDING TEST: CPT | Performed by: PHYSICIAN ASSISTANT

## 2017-11-06 PROCEDURE — 82728 ASSAY OF FERRITIN: CPT | Performed by: PHYSICIAN ASSISTANT

## 2017-11-06 PROCEDURE — 82607 VITAMIN B-12: CPT | Performed by: PHYSICIAN ASSISTANT

## 2017-11-06 PROCEDURE — 83970 ASSAY OF PARATHORMONE: CPT | Performed by: PHYSICIAN ASSISTANT

## 2017-11-08 ENCOUNTER — OFFICE VISIT (OUTPATIENT)
Dept: SURGERY | Facility: CLINIC | Age: 32
End: 2017-11-08
Payer: COMMERCIAL

## 2017-11-08 VITALS
DIASTOLIC BLOOD PRESSURE: 77 MMHG | SYSTOLIC BLOOD PRESSURE: 138 MMHG | HEART RATE: 74 BPM | WEIGHT: 258.56 LBS | BODY MASS INDEX: 40.5 KG/M2

## 2017-11-08 DIAGNOSIS — K91.2 POSTSURGICAL MALABSORPTION: ICD-10-CM

## 2017-11-08 DIAGNOSIS — Z98.84 BARIATRIC SURGERY STATUS: Primary | ICD-10-CM

## 2017-11-08 DIAGNOSIS — E66.01 MORBID OBESITY WITH BMI OF 40.0-44.9, ADULT (H): ICD-10-CM

## 2017-11-08 DIAGNOSIS — Z98.84 BARIATRIC SURGERY STATUS: ICD-10-CM

## 2017-11-08 DIAGNOSIS — E66.01 MORBID OBESITY WITH BODY MASS INDEX OF 40.0-44.9 IN ADULT (H): ICD-10-CM

## 2017-11-08 DIAGNOSIS — K91.2 POSTOPERATIVE MALABSORPTION: ICD-10-CM

## 2017-11-08 PROBLEM — N39.3 STRESS INCONTINENCE: Status: RESOLVED | Noted: 2017-05-10 | Resolved: 2017-11-08

## 2017-11-08 PROCEDURE — 97803 MED NUTRITION INDIV SUBSEQ: CPT | Performed by: DIETITIAN, REGISTERED

## 2017-11-08 PROCEDURE — 99214 OFFICE O/P EST MOD 30 MIN: CPT | Performed by: PHYSICIAN ASSISTANT

## 2017-11-08 NOTE — MR AVS SNAPSHOT
After Visit Summary   11/8/2017    Clarissa Paulino    MRN: [id number]           Patient Information     Date Of Birth          1985        Visit Information        Provider Department      11/8/2017 9:30 AM Dallas San PA-C [facility]      Today's Diagnoses     Bariatric surgery status    -  1    Morbid obesity with BMI of 40.0-44.9, adult (H)        Postoperative malabsorption        Postsurgical malabsorption          Care Instructions    3 months status laparoscopic gastric sleeve  Morbid Obesity improved  Body mass index is 40.5 kg/(m^2)..  Post surgical malabsorption:   Reviewed vitamin B12, vitamin D, PTH, ferritin, TIBC, and iron labs.   Orders placed for 6 month labs to be drawn a week before her appointment   Follow food plan per dietitian recommendations.   Continue taking recommended post-op vitamins.  Heartburn - Trial off omeprazole.  Can use zantac 150-300 mg up to twice daily as needed  Telogen effluvium - discussed biotin and nioxin shampoo.  Handout provided  Constipation - continue with senna  Return to clinic in 3 months.          Follow-ups after your visit        Future tests that were ordered for you today     Open Future Orders        Priority Expected Expires Ordered    CBC with platelets Routine 11/8/2017 5/7/2018 11/8/2017    Iron Routine 11/8/2017 5/7/2018 11/8/2017    Iron and Iron Binding Capacity Routine 11/8/2017 5/7/2018 11/8/2017    Ferritin Routine 11/8/2017 5/7/2018 11/8/2017    Vitamin D Screen Routine 11/8/2017 5/7/2018 11/8/2017    Parathyroid Hormone Intact Routine 11/8/2017 5/7/2018 11/8/2017            Who to contact     If you have questions or need follow up information about today's clinic visit or your schedule please contact [facility] directly at [phone].  Normal or non-critical lab and imaging results will be communicated  to you by Orchard Platformhart, letter or phone within 4 business days after the clinic has received the results. If you do not hear from us within 7 days, please contact the clinic through Drizly or phone. If you have a critical or abnormal lab result, we will notify you by phone as soon as possible.  Submit refill requests through Drizly or call your pharmacy and they will forward the refill request to us. Please allow 3 business days for your refill to be completed.          Additional Information About Your Visit        Drizly Information     Drizly gives you secure access to your electronic health record. If you see a primary care provider, you can also send messages to your care team and make appointments. If you have questions, please call your primary care clinic.  If you do not have a primary care provider, please call [phone] and they will assist you.        Care EveryWhere ID     This is your Care EveryWhere ID. This could be used by other organizations to access your [facility] medical records  [id number]        Your Vitals Were     Pulse BMI (Body Mass Index)                74 40.5 kg/m2           Blood Pressure from Last 3 Encounters:   11/08/17 138/77   08/21/17 108/72   08/15/17 123/80    Weight from Last 3 Encounters:   11/08/17 258 lb 9 oz (117.3 kg)   08/21/17 284 lb 8 oz (129 kg)   08/15/17 284 lb 3.2 oz (128.9 kg)              We Performed the Following     OP ROOMING NOTE TO NEVILLE        Primary Care Provider Office Phone # Fax #    Krista Landaverde [phone]       [address]  [facility] [address]        Equal Access to Services     [facility]: Hadii aad ku hadasho Soomaali, waaxda luqadaha, qaybta kaalmada soren roberts . So [facility] [phone].    ATENCIÓN: Si habla español, tiene a chirinos disposición servicios gratuitos de asistencia lingüística. Llame al [phone].    We comply with applicable federal civil rights laws and [place]  laws. We do not discriminate on the basis of race, color, national origin, age, disability, sex, sexual orientation, or gender identity.            Thank you!     Thank you for choosing [facility] [facility]  for your care. Our goal is always to provide you with excellent care. Hearing back from our patients is one way we can continue to improve our services. Please take a few minutes to complete the written survey that you may receive in the mail after your visit with us. Thank you!             Your Updated Medication List - Protect others around you: Learn how to safely use, store and throw away your medicines at www.disposemymeds.org.          This list is accurate as of: 11/8/17 10:09 AM.  Always use your most recent med list.                   Brand Name Dispense Instructions for use Diagnosis    B-12-SL SL      Place 1,000 mcg under the tongue daily        calcium citrate-vitamin D 315-250 MG-UNIT Tabs per tablet    CITRACAL     Take 2 tablets by mouth 2 times daily        LEXAPRO PO      Take 10 mg by mouth daily        multivitamin  peds with iron 60 MG chewable tablet      Take 2 chew tab by mouth every morning        OMEPRAZOLE PO      Take 20 mg by mouth daily        TYLENOL PO      Take 650 mg by mouth every 8 hours as needed for mild pain or fever        VITAMIN D (CHOLECALCIFEROL) PO      Take 2,000 Units by mouth daily        VITRON-C PO      Take 1 tablet by mouth every morning

## 2017-11-08 NOTE — PROGRESS NOTES
BARIATRIC FOLLOW UP VISIT     November 8, 2017       HISTORY OF PRESENT ILLNESS: Pt returns today for her follow-up appointment status post laparoscopic gastric sleeve.     Initial Weight: 298 lb 4 oz (135.3 kg)   Current Weight: 258 lb 9 oz (117.3 kg)  Cumulative weight loss (lbs): 39.69  Last Visits Weight: 277 lb 7 oz (125.8 kg)     Patient is taking the following bariatric postoperative vitamins:  2 Complete multivitamins with minerals (at different times than calcium)   2000 International Units of Vitamin D daily  1200 mg of Calcium daily in divided doses  1000 mcg of Vitamin B12 sl daily  1 Iron/Vit C. daily    Pt is exercising. Walking 2 miles at work daily.  Will do elliptical at home 1-4 times per week.  30-60 minutes.         OBESITY RELATED CONDITIONS:  Stress incontinence - resolved  Heartburn  Back pain - none       SOCIAL HISTORY:  Pt denies smoking.  Pt denies alcohol use.  Avoids NSAIDS.        REVIEW OF SYSTEMS:     GI:  Nausea-seldom - Had one mild episode in the past month  Vomiting-NO  Diarrhea-NO  Constipation-takes one senna stool softener daily. Has BM's every other day.  Gets in 60+ oz of water daily  Dysphagia-NO  Abdominal Pain-NO  Heartburn-NO     SKIN:  Intertriginous irritation-NO    Telogen effluvium -Yes     PSYCH:  Depression-No.  On meds  Anxiety-No on meds          PHYSICAL EXAMINATION:   /77  Pulse 74  Wt 258 lb 9 oz (117.3 kg)  BMI 40.5 kg/m2    GENERAL: Alert and oriented x3. NAD  HEART: No murmurs, rubs or gallops, Regular rate and rhythm  LUNGS: Breathing unlabored, Lung sounds clear to auscultation bilaterally  ABDOMEN: soft; nontender; nondistended, incision well healed. No hernia  EXTREMITIES: No LE edema bilaterally, Gait normal  SKIN: No intertriginous irritation or rash      ASSESSMENT AND PLAN:      3 months status laparoscopic gastric sleeve  Morbid Obesity improved  Body mass index is 40.5 kg/(m^2)..  Post surgical malabsorption:   Reviewed vitamin B12,  vitamin D, PTH, ferritin, TIBC, and iron labs.   Orders placed for 6 month labs to be drawn a week before her appointment   Follow food plan per dietitian recommendations.   Continue taking recommended post-op vitamins.  Heartburn - Trial off omeprazole.  Can use zantac 150-300 mg up to twice daily as needed  Stress incontinence - resolved  Telogen effluvium - discussed biotin and nioxin shampoo.  Handout provided  Constipation - continue with senna  Return to clinic in 3 months.      I spent a total of 20 minutes face to face with Clarissa during today's office visit. Over 50% of this time was spent counseling the patient and/or coordinating care.

## 2017-11-08 NOTE — MR AVS SNAPSHOT
MRN:[id number]                      After Visit Summary   11/8/2017    Clarissa Paulino    MRN: [id number]           Visit Information        Provider Department      11/8/2017 10:00 AM 1, Sh Tabatha Diet, RD [facility]      Your next 10 appointments already scheduled     Feb 14, 2018 10:00 AM CST   Return Visit with Dallas San PA-C   [facility] ([facility])    [address] [place] [id number] [phone]            Feb 14, 2018 10:30 AM CST   Return Visit with Hannah Wl Diet 3, RD   [facility] ([facility])    [address] [place] [id number] [phone]              MyChart Information     [facility] gives you secure access to your electronic health record. If you see a primary care provider, you can also send messages to your care team and make appointments. If you have questions, please call your primary care clinic.  If you do not have a primary care provider, please call [phone] and they will assist you.        Care EveryWhere ID     This is your Care EveryWhere ID. This could be used by other organizations to access your [place] medical records  [id number]        Equal Access to Services     QIAN SANCHEZ : Hadii john bower hadasho Sokenneyali, waaxda luqadaha, qaybta kaalmada adeegyada, soren shelley. So [facility] [phone].    ATENCIÓN: Si habla español, tiene a chirinos disposición servicios gratuitos de asistencia lingüística. Llame al [phone].    We comply with applicable federal civil rights laws and [place] laws. We do not discriminate on the basis of race, color, national origin, age, disability, sex, sexual orientation, or gender identity.

## 2017-11-08 NOTE — PROGRESS NOTES
"NUTRITION POST OP APPOINTMENT  DATE OF VISIT: November 8, 2017    Clarissa Paulino  1985  female  [id number]  31 year old     ASSESSMENT:    REASON FOR VISIT:  Clarissa is a 31 year old year old female presents today for 3 month PO nutrition follow-up appointment. Patient is accompanied by self.    DIAGNOSIS:  Status post gastric sleeve surgery.  Obesity Grade III BMI >40     ANTHROPOMETRICS:  Initial Weight: 298.4 lbs  Height:  67\"  Current Weight: 258.9\"    BMI: 40.5  kg/(m^2).    VITAMINS AND MINERALS:  2 Multivitamin with Minerals  600 mg Calcium With Vitamin D BID  2000 International units Vitamin D  1000 mcg Vitamin B-12 sublingual  1 Vitron C    NUTRITION HISTORY:  Breakfast: protein drink (30 g. protein)  Lunch: lettuce salad with ~ 2 oz chicken, parmesan cheese, Caesar salad or deli meat with low-fat cheese stick  Supper: ~ 2 oz shrimp or poultry, cooked broccoli or cauliflower, carrot, green bean blend  Snacks: rarely-string cheese mid morning  Fluids consumed: Water, Crystal Light and Decaf coffee, protein drink  Consuming liquid calories: Yes  Protein intake: ~60 grams/day  Tolerate regular texture food: Yes  Any foods not tolerated details: Yes  If any food not tolerated: turkey kielbasa, ground beef  Portion size: 1/2-1 cup  Take 20-30 minutes to consume each meal: Yes   Eat protein foods first: Yes  Fluids and meals separate by at least 30 minutes: Yes  Chew foods thoroughly: Yes  Tolerating diet: Yes  Drinking high protein supplements: Yes  Consuming snacks per day: 0-1  Additional Information: patient says she is taking 1 stool softener per day to help with constipation; discussed foods rich in fiber and set goal around fiber; protein drinks do not always keep her full in the morning; stressed the importance of eating sold food as able at breakfast; pt had many appropriate questions      PHYSICAL ACTIVITY:  Type: walking or exercise DVD  Frequency (days per week): 6  Duration (min): " 30    DIAGNOSIS:  Previous Nutrition Diagnosis: Altered gastrointestinal function related to alteration in gastrointestinal structure as evidenced by history of gastric sleeve surgery.- no change    Previous goals:  Aim for 3 food groups per meal   Aim for 10-15g of fiber   Continue supplementing with protein drinks to obtain goal of 60-90g/day  Current Nutrition Diagnosis: Altered gastrointestinal function related to alteration in gastrointestinal structure as evidenced by history of gastric sleeve surgery.    INTERVENTION:   Nutrition Prescription: Eat 3 meals a day at regular intervals. Consume 60-90 grams of protein daily. Follow post-surgical vitamin and mineral protocol.  Assessed learning needs and learning preferences.    GOALS:  Relating To Eating:  Gradually increase fiber intake to 10-15 grams of fiber per day  Have 1 serving of fruit per day Eat solid food for brekfst 4 X per week    Follow-Up:   Implementation: Discussed progress toward previous goals; reinforced importance of following bariatric lifestyle changes.    NUTRITION MONITORING AND EVALUATION:  Anticipated compliance: fair-good  Verbalized fair-good understanding.    Follow up: Patient to follow up in 3 months.    TIME SPENT WITH PATIENT:  20 minutes  Tod Quiñones RD, LD  [facility]  [phone]

## 2017-11-08 NOTE — PATIENT INSTRUCTIONS
3 months status laparoscopic gastric sleeve  Morbid Obesity improved  Body mass index is 40.5 kg/(m^2)..  Post surgical malabsorption:   Reviewed vitamin B12, vitamin D, PTH, ferritin, TIBC, and iron labs.   Orders placed for 6 month labs to be drawn a week before her appointment   Follow food plan per dietitian recommendations.   Continue taking recommended post-op vitamins.  Heartburn - Trial off omeprazole.  Can use zantac 150-300 mg up to twice daily as needed  Telogen effluvium - discussed biotin and nioxin shampoo.  Handout provided  Constipation - continue with senna  Return to clinic in 3 months.

## 2018-01-08 DIAGNOSIS — F41.1 GENERALIZED ANXIETY DISORDER: ICD-10-CM

## 2018-01-08 RX ORDER — ESCITALOPRAM OXALATE 10 MG/1
TABLET ORAL
Qty: 30 TABLET | Refills: 0 | Status: SHIPPED | OUTPATIENT
Start: 2018-01-08 | End: 2018-01-31

## 2018-01-08 NOTE — TELEPHONE ENCOUNTER
"Requested Prescriptions   Pending Prescriptions Disp Refills     escitalopram (LEXAPRO) 10 MG tablet [Pharmacy Med Name: ESCITALOPRAM OXALATE 10MG TABS] 90 tablet 1     Sig: TAKE ONE TABLET BY MOUTH EVERY DAY    SSRIs Protocol Failed    1/8/2018  1:05 PM       Failed - No positive pregnancy test in last 12 months       Passed - Recent or future visit with authorizing provider    Patient had office visit in the last year or has a visit in the next 30 days with authorizing provider.  See \"Patient Info\" tab in inbasket, or \"Choose Columns\" in Meds & Orders section of the refill encounter.              Passed - Patient is age 18 or older       Passed - No active pregnancy on record        Routing refill request to provider for review/approval because:  Unable to fill due to SSRI protocol    Swapna Doran RN          "

## 2018-01-09 NOTE — TELEPHONE ENCOUNTER
Krista Landaverde MD   [facility] [employer] 12 hours ago (8:30 PM)                 30 day refill given, due for follow up.   Krista Landaverde MD MPH   (Routing comment)         This writer attempted to contact Patient on 01/09/18      Reason for call Patient given a 30 day chanel refill needs follow up appt and left message to return call.      If patient calls back:   Schedule Office Visit appointment within 2 weeks with PCP .  Sent My Chart message.      Oneida Fuller MA

## 2018-01-31 ENCOUNTER — OFFICE VISIT (OUTPATIENT)
Dept: FAMILY MEDICINE | Facility: CLINIC | Age: 33
End: 2018-01-31
Payer: COMMERCIAL

## 2018-01-31 VITALS
BODY MASS INDEX: 39.39 KG/M2 | DIASTOLIC BLOOD PRESSURE: 76 MMHG | HEIGHT: 67 IN | WEIGHT: 251 LBS | SYSTOLIC BLOOD PRESSURE: 118 MMHG | HEART RATE: 86 BPM | TEMPERATURE: 97.5 F | OXYGEN SATURATION: 96 %

## 2018-01-31 DIAGNOSIS — Z23 NEED FOR PROPHYLACTIC VACCINATION WITH TETANUS-DIPHTHERIA (TD): ICD-10-CM

## 2018-01-31 DIAGNOSIS — Z98.84 BARIATRIC SURGERY STATUS: ICD-10-CM

## 2018-01-31 DIAGNOSIS — Z23 NEED FOR PROPHYLACTIC VACCINATION AND INOCULATION AGAINST INFLUENZA: ICD-10-CM

## 2018-01-31 DIAGNOSIS — F41.1 GAD (GENERALIZED ANXIETY DISORDER): ICD-10-CM

## 2018-01-31 DIAGNOSIS — F41.1 GENERALIZED ANXIETY DISORDER: ICD-10-CM

## 2018-01-31 DIAGNOSIS — Z12.4 SCREENING FOR MALIGNANT NEOPLASM OF CERVIX: ICD-10-CM

## 2018-01-31 DIAGNOSIS — Z00.00 ROUTINE GENERAL MEDICAL EXAMINATION AT A HEALTH CARE FACILITY: Primary | ICD-10-CM

## 2018-01-31 DIAGNOSIS — K91.2 POSTSURGICAL MALABSORPTION: ICD-10-CM

## 2018-01-31 LAB
ERYTHROCYTE [DISTWIDTH] IN BLOOD BY AUTOMATED COUNT: 11.9 % (ref 10–15)
FERRITIN SERPL-MCNC: 131 NG/ML (ref 12–150)
HCT VFR BLD AUTO: 42.2 % (ref 35–47)
HGB BLD-MCNC: 14.5 G/DL (ref 11.7–15.7)
IRON SATN MFR SERPL: 41 % (ref 15–46)
IRON SERPL-MCNC: 112 UG/DL (ref 35–180)
MCH RBC QN AUTO: 32.4 PG (ref 26.5–33)
MCHC RBC AUTO-ENTMCNC: 34.4 G/DL (ref 31.5–36.5)
MCV RBC AUTO: 94 FL (ref 78–100)
PLATELET # BLD AUTO: 255 10E9/L (ref 150–450)
PTH-INTACT SERPL-MCNC: 30 PG/ML (ref 12–72)
RBC # BLD AUTO: 4.47 10E12/L (ref 3.8–5.2)
TIBC SERPL-MCNC: 276 UG/DL (ref 240–430)
WBC # BLD AUTO: 9.6 10E9/L (ref 4–11)

## 2018-01-31 PROCEDURE — 83550 IRON BINDING TEST: CPT | Performed by: PHYSICIAN ASSISTANT

## 2018-01-31 PROCEDURE — 90714 TD VACC NO PRESV 7 YRS+ IM: CPT | Performed by: PREVENTIVE MEDICINE

## 2018-01-31 PROCEDURE — 36415 COLL VENOUS BLD VENIPUNCTURE: CPT | Performed by: PHYSICIAN ASSISTANT

## 2018-01-31 PROCEDURE — 87624 HPV HI-RISK TYP POOLED RSLT: CPT | Performed by: PREVENTIVE MEDICINE

## 2018-01-31 PROCEDURE — 82306 VITAMIN D 25 HYDROXY: CPT | Performed by: PHYSICIAN ASSISTANT

## 2018-01-31 PROCEDURE — 82728 ASSAY OF FERRITIN: CPT | Performed by: PHYSICIAN ASSISTANT

## 2018-01-31 PROCEDURE — 85027 COMPLETE CBC AUTOMATED: CPT | Performed by: PHYSICIAN ASSISTANT

## 2018-01-31 PROCEDURE — G0145 SCR C/V CYTO,THINLAYER,RESCR: HCPCS | Performed by: PREVENTIVE MEDICINE

## 2018-01-31 PROCEDURE — 83540 ASSAY OF IRON: CPT | Performed by: PHYSICIAN ASSISTANT

## 2018-01-31 PROCEDURE — 90471 IMMUNIZATION ADMIN: CPT | Performed by: PREVENTIVE MEDICINE

## 2018-01-31 PROCEDURE — 83970 ASSAY OF PARATHORMONE: CPT | Performed by: PHYSICIAN ASSISTANT

## 2018-01-31 PROCEDURE — 99395 PREV VISIT EST AGE 18-39: CPT | Mod: 25 | Performed by: PREVENTIVE MEDICINE

## 2018-01-31 RX ORDER — ESCITALOPRAM OXALATE 10 MG/1
10 TABLET ORAL DAILY
Qty: 90 TABLET | Refills: 1 | Status: SHIPPED | OUTPATIENT
Start: 2018-01-31 | End: 2018-11-14

## 2018-01-31 ASSESSMENT — ANXIETY QUESTIONNAIRES
3. WORRYING TOO MUCH ABOUT DIFFERENT THINGS: NOT AT ALL
6. BECOMING EASILY ANNOYED OR IRRITABLE: NOT AT ALL
7. FEELING AFRAID AS IF SOMETHING AWFUL MIGHT HAPPEN: NOT AT ALL
2. NOT BEING ABLE TO STOP OR CONTROL WORRYING: NOT AT ALL
GAD7 TOTAL SCORE: 0
IF YOU CHECKED OFF ANY PROBLEMS ON THIS QUESTIONNAIRE, HOW DIFFICULT HAVE THESE PROBLEMS MADE IT FOR YOU TO DO YOUR WORK, TAKE CARE OF THINGS AT HOME, OR GET ALONG WITH OTHER PEOPLE: NOT DIFFICULT AT ALL
1. FEELING NERVOUS, ANXIOUS, OR ON EDGE: NOT AT ALL
5. BEING SO RESTLESS THAT IT IS HARD TO SIT STILL: NOT AT ALL

## 2018-01-31 ASSESSMENT — PAIN SCALES - GENERAL: PAINLEVEL: NO PAIN (0)

## 2018-01-31 ASSESSMENT — PATIENT HEALTH QUESTIONNAIRE - PHQ9: 5. POOR APPETITE OR OVEREATING: NOT AT ALL

## 2018-01-31 NOTE — PROGRESS NOTES
SUBJECTIVE:   CC: Clarissa Paulino is an 32 year old woman who presents for preventive health visit.     Physical   Annual:     Getting at least 3 servings of Calcium per day::  Yes    Bi-annual eye exam::  NO    Dental care twice a year::  Yes    Sleep apnea or symptoms of sleep apnea::  None    Diet::  Regular (no restrictions)    Frequency of exercise::  6-7 days/week    Duration of exercise::  30-45 minutes    Taking medications regularly::  Yes    Medication side effects::  None    Additional concerns today::  No            Anxiety Follow-Up    Status since last visit: Improved     Other associated symptoms:None    Complicating factors:   Significant life event: Yes-  Bariatric surgery a few months ago   Current substance abuse: None  Depression symptoms: No  MONTEZ-7 SCORE 1/31/2018   Total Score 0       MONTEZ-7    Today's PHQ-2 Score:   PHQ-2 ( 1999 Pfizer) 1/31/2018   Q1: Little interest or pleasure in doing things 0   Q2: Feeling down, depressed or hopeless 0   PHQ-2 Score 0   Q1: Little interest or pleasure in doing things Not at all   Q2: Feeling down, depressed or hopeless Not at all   PHQ-2 Score 0       Abuse: Current or Past(Physical, Sexual or Emotional)- No  Do you feel safe in your environment - Yes    Social History   Substance Use Topics     Smoking status: Never Smoker     Smokeless tobacco: Never Used     Alcohol use No     Alcohol Use 1/31/2018   If you drink alcohol, do you typically have greater than 3 drinks per day OR greater than 7 drinks per week?   Not applicable   No flowsheet data found.    Reviewed orders with patient.  Reviewed health maintenance and updated orders accordingly - Yes  Labs reviewed in EPIC  BP Readings from Last 3 Encounters:   01/31/18 118/76   11/08/17 138/77   08/21/17 108/72    Wt Readings from Last 3 Encounters:   01/31/18 251 lb (113.9 kg)   11/08/17 258 lb 9 oz (117.3 kg)   08/21/17 284 lb 8 oz (129 kg)                  Patient Active Problem List   Diagnosis      CARDIOVASCULAR SCREENING; LDL GOAL LESS THAN 160     MONTEZ (generalized anxiety disorder)     Morbid obesity with body mass index of 40.0-44.9 in adult (H)     History of infertility     Bilateral edema of lower extremity     Bariatric surgery status     Past Surgical History:   Procedure Laterality Date     LAPAROSCOPIC GASTRIC SLEEVE N/A 8/7/2017    Procedure: LAPAROSCOPIC GASTRIC SLEEVE;  LAPAROSCOPIC GASTRIC SLEEVE;  Surgeon: Sloan Burnette MD;  Location: [facility] OR     ORTHOPEDIC SURGERY  1991    Left elbow       Social History   Substance Use Topics     Smoking status: Never Smoker     Smokeless tobacco: Never Used     Alcohol use No     Family History   Problem Relation Age of Onset     Depression/Anxiety Mother      Anxiety     Thyroid Disease Mother      Hypothyroid     Anxiety Disorder Mother      Thyroid Disease Mother      hypothyroid     Obesity Mother      Other Cancer Maternal Grandmother      Hodgkins Lymphoma     Obesity Maternal Grandmother      Prostate Cancer Paternal Grandfather      Anxiety Disorder Brother      Depression/Anxiety Sister      Anxiety     Anxiety Disorder Sister          Current Outpatient Prescriptions   Medication Sig Dispense Refill     escitalopram (LEXAPRO) 10 MG tablet Take 1 tablet (10 mg) by mouth daily 90 tablet 1     OMEPRAZOLE PO Take 20 mg by mouth daily       Acetaminophen (TYLENOL PO) Take 650 mg by mouth every 8 hours as needed for mild pain or fever       multivitamin  peds with iron (FLINTSTONES COMPLETE) 60 MG chewable tablet Take 2 chew tab by mouth every morning       Cyanocobalamin (B-12-SL SL) Place 1,000 mcg under the tongue daily        Iron-Vitamin C (VITRON-C PO) Take 1 tablet by mouth every morning       calcium citrate-vitamin D (CITRACAL) 315-250 MG-UNIT TABS per tablet Take 2 tablets by mouth 2 times daily        VITAMIN D, CHOLECALCIFEROL, PO Take 2,000 Units by mouth daily       [DISCONTINUED] escitalopram (LEXAPRO) 10 MG tablet TAKE ONE TABLET  "BY MOUTH EVERY DAY 30 tablet 0     [DISCONTINUED] Escitalopram Oxalate (LEXAPRO PO) Take 10 mg by mouth daily       Allergies   Allergen Reactions     No Clinical Screening - See Comments Anaphylaxis     Mice       Mammogram not appropriate for this patient based on age.    Pertinent mammograms are reviewed under the imaging tab.  History of abnormal Pap smear: NO - age 30-65 PAP every 5 years with negative HPV co-testing recommended    Reviewed and updated as needed this visit by clinical staff  Tobacco  Allergies  Meds  Med Hx  Surg Hx  Fam Hx  Soc Hx        Reviewed and updated as needed this visit by Provider  Med Hx  Surg Hx        Past Medical History:   Diagnosis Date     Gastroesophageal reflux disease       Past Surgical History:   Procedure Laterality Date     LAPAROSCOPIC GASTRIC SLEEVE N/A 8/7/2017    Procedure: LAPAROSCOPIC GASTRIC SLEEVE;  LAPAROSCOPIC GASTRIC SLEEVE;  Surgeon: Sloan Burnette MD;  Location:  OR     ORTHOPEDIC SURGERY  1991    Left elbow       Review of Systems  C: NEGATIVE for fever, chills, change in weight  I: NEGATIVE for worrisome rashes, moles or lesions  E: NEGATIVE for vision changes or irritation  ENT: NEGATIVE for ear, mouth and throat problems  R: NEGATIVE for significant cough or SOB  B: NEGATIVE for masses, tenderness or discharge  CV: NEGATIVE for chest pain, palpitations or peripheral edema  GI: NEGATIVE for nausea, abdominal pain, heartburn, or change in bowel habits  : NEGATIVE for unusual urinary or vaginal symptoms. Periods are regular.  M: NEGATIVE for significant arthralgias or myalgia  N: NEGATIVE for weakness, dizziness or paresthesias  E: NEGATIVE for temperature intolerance, skin/hair changes  H: NEGATIVE for bleeding problems  P: NEGATIVE for changes in mood or affect     OBJECTIVE:   /76 (BP Location: Right arm, Patient Position: Chair, Cuff Size: Adult Large)  Pulse 86  Temp 97.5  F (36.4  C) (Oral)  Ht 5' 7\" (1.702 m)  Wt 251 lb " (113.9 kg)  LMP 01/08/2018 (Exact Date)  SpO2 96%  Breastfeeding? No  BMI 39.31 kg/m2  Physical Exam  GENERAL: healthy, alert and no distress  EYES: Eyes grossly normal to inspection, PERRL and conjunctivae and sclerae normal  HENT: ear canals and TM's normal, nose and mouth without ulcers or lesions  NECK: no adenopathy, no asymmetry, masses, or scars and thyroid normal to palpation  RESP: lungs clear to auscultation - no rales, rhonchi or wheezes  BREAST: normal without masses, tenderness or nipple discharge and no palpable axillary masses or adenopathy  CV: regular rate and rhythm, normal S1 S2, no S3 or S4, no murmur, click or rub, no peripheral edema and peripheral pulses strong  ABDOMEN: soft, nontender, no hepatosplenomegaly, no masses    (female): normal female external genitalia, normal urethral meatus, vaginal mucosa pink, moist, well rugated, and normal cervix/adnexa/uterus without masses or discharge, cervix clearly seen for pap smear   MS: no gross musculoskeletal defects noted, no edema  SKIN: no suspicious lesions or rashes  NEURO: Normal strength and tone, mentation intact and speech normal  PSYCH: mentation appears normal, affect normal/bright  LYMPH: no cervical, supraclavicular, axillary adenopathy      Orders Only on 11/06/2017   Component Date Value Ref Range Status     Vitamin D Deficiency screening 11/06/2017 57  20 - 75 ug/L Final    Comment: Season, race, dietary intake, and treatment affect the concentration of   25-hydroxy-Vitamin D. Values may decrease during winter months and increase   during summer months. Values 20-29 ug/L may indicate Vitamin D insufficiency   and values <20 ug/L may indicate Vitamin D deficiency.  Vitamin D determination is routinely performed by an immunoassay specific for   25 hydroxyvitamin D3.  If an individual is on vitamin D2 (ergocalciferol)   supplementation, please specify 25 OH vitamin D2 and D3 level determination by   LCMSMS test VITD23.        "Parathyroid Hormone Intact 11/06/2017 24  12 - 72 pg/mL Final     Iron 11/06/2017 107  35 - 180 ug/dL Final     Iron Binding Cap 11/06/2017 295  240 - 430 ug/dL Final     Iron Saturation Index 11/06/2017 36  15 - 46 % Final     Ferritin 11/06/2017 150  12 - 150 ng/mL Final     Vitamin B12 11/06/2017 688  193 - 986 pg/mL Final       ASSESSMENT/PLAN:   Clarissa was seen today for physical.    Diagnoses and all orders for this visit:    Routine general medical examination at a health care facility  -USPSTF guidelines reviewed     Screening for malignant neoplasm of cervix  -     Pap imaged thin layer screen with HPV - recommended age 30 - 65 years (select HPV order below)  -     HPV High Risk Types DNA Cervical    Need for prophylactic vaccination and inoculation against influenza  -          ADMIN VACCINE, ADDL [00566]    Need for prophylactic vaccination with tetanus-diphtheria (TD)  -     TD (ADULT, 7+) PRESERVE FREE    Generalized anxiety disorder  -     escitalopram (LEXAPRO) 10 MG tablet; Take 1 tablet (10 mg) by mouth daily  -Refills on medication provided  -MONTEZ score is 0  Phone or My chart follow up in 6 months, sooner if any changes or concerns         COUNSELING:  Reviewed preventive health counseling, as reflected in patient instructions       Regular exercise       Healthy diet/nutrition       Immunizations    Vaccinated for: Td               reports that she has never smoked. She has never used smokeless tobacco.    Estimated body mass index is 39.31 kg/(m^2) as calculated from the following:    Height as of this encounter: 5' 7\" (1.702 m).    Weight as of this encounter: 251 lb (113.9 kg).   Weight management plan: Discussed healthy diet and exercise guidelines and patient will follow up in 12 months in clinic to re-evaluate.    Counseling Resources:  ATP IV Guidelines  Pooled Cohorts Equation Calculator  Breast Cancer Risk Calculator  FRAX Risk Assessment  ICSI Preventive Guidelines  Dietary Guidelines " for Americans, 2010  USDA's MyPlate  ASA Prophylaxis  Lung CA Screening    Krista Landaverde MD MPH    [facility]      Answers for HPI/ROS submitted by the patient on 1/31/2018   PHQ-2 Score: 0

## 2018-01-31 NOTE — PATIENT INSTRUCTIONS
At [facility], we strive to deliver an exceptional experience to you, every time we see you.  If you receive a survey in the mail, please send us back your thoughts. We really do value your feedback.    Based on your medical history, these are the current health maintenance/preventive care services that you are due for (some may have been done at this visit.)  Health Maintenance Due   Topic Date Due     INFLUENZA VACCINE (SYSTEM ASSIGNED)  09/01/2017     TETANUS IMMUNIZATION (SYSTEM ASSIGNED)  01/10/2018     PAP Q3 YR  03/13/2018         Suggested websites for health information:  Www.JumpIn.12Bis : Up to date and easily searchable information on multiple topics.  Www.ResQâ„¢ Medical.gov : medication info, interactive tutorials, watch real surgeries online  Www.familydoctor.org : good info from the Academy of Family Physicians  Www.cdc.gov : public health info, travel advisories, epidemics (H1N1)  Www.aap.org : children's health info, normal development, vaccinations  Www.health.[facility].[place].us : [place] dept of health, public health issues in [place], N1N1    Your care team:                            Family Medicine Internal Medicine   MD Ankur Hogue MD Shantel Branch-Fleming, MD Katya Georgiev PA-C Nam Ho, MD Pediatrics   DEANDRE Gomez, MELANIE Lange APRN CNP   MD Lynette Howard MD Deborah Mielke, MD Kim Thein, APRN CNP      Clinic hours: Monday - Thursday 7 am-7 pm; Fridays 7 am-5 pm.   Urgent care: Monday - Friday 11 am-9 pm; Saturday and Sunday 9 am-5 pm.  Pharmacy : Monday -Thursday 8 am-8 pm; Friday 8 am-6 pm; Saturday and Sunday 9 am-5 pm.     Clinic: [phone]   Pharmacy: [phone]

## 2018-01-31 NOTE — MR AVS SNAPSHOT
After Visit Summary   1/31/2018    Clarissa Paulino    MRN: [id number]           Patient Information     Date Of Birth          1985        Visit Information        Provider Department      1/31/2018 11:00 AM Krista Landaverde MD [facility]        Today's Diagnoses     Routine general medical examination at a health care facility    -  1    MONTEZ (generalized anxiety disorder)        Screening for malignant neoplasm of cervix        Need for prophylactic vaccination and inoculation against influenza        Need for prophylactic vaccination with tetanus-diphtheria (TD)        Generalized anxiety disorder        Bariatric surgery status        Postsurgical malabsorption          Care Instructions    At [facility], we strive to deliver an exceptional experience to you, every time we see you.  If you receive a survey in the mail, please send us back your thoughts. We really do value your feedback.    Based on your medical history, these are the current health maintenance/preventive care services that you are due for (some may have been done at this visit.)  Health Maintenance Due   Topic Date Due     INFLUENZA VACCINE (SYSTEM ASSIGNED)  09/01/2017     TETANUS IMMUNIZATION (SYSTEM ASSIGNED)  01/10/2018     PAP Q3 YR  03/13/2018         Suggested websites for health information:  Www.[facility].org : Up to date and easily searchable information on multiple topics.  Www.medlineplus.gov : medication info, interactive tutorials, watch real surgeries online  Www.familydoctor.org : good info from the Academy of Family Physicians  Www.cdc.gov : public health info, travel advisories, epidemics (H1N1)  Www.aap.org : children's health info, normal development, vaccinations  Www.health.[facility].[place].us : [place] dept of health, public health issues in [place], N1N1    Your care team:                            Family Medicine Internal Medicine   MD Ankur Hogue MD Shantel  MD Stephanie Stephen PA-C, MD Pediatrics   DEANDRE Gomez, MELANIE Lange APRN CNP   MD Lynette Howard MD Deborah Mielke, MD Kim Thein, APRN [facility]      Clinic hours: Monday - Thursday 7 am-7 pm; Fridays 7 am-5 pm.   Urgent care: Monday - Friday 11 am-9 pm; Saturday and Sunday 9 am-5 pm.  Pharmacy : Monday -Thursday 8 am-8 pm; Friday 8 am-6 pm; Saturday and Sunday 9 am-5 pm.     Clinic: [phone]   Pharmacy: [phone]            Follow-ups after your visit        Follow-up notes from your care team     Return in about 6 months (around 7/31/2018) for Mood follow up.      Your next 10 appointments already scheduled     Feb 14, 2018 10:00 AM CST   Return Visit with Dallas San PA-C   [facility] ([facility])    [address] [place] [id number] [phone]            Feb 14, 2018 10:30 AM CST   Return Visit with Hannah Mays Diet 3, RD   [facility] ([facility])    [address] [place] [id number] [phone]              Who to contact     If you have questions or need follow up information about today's clinic visit or your schedule please contact [facility] directly at [phone].  Normal or non-critical lab and imaging results will be communicated to you by MyChart, letter or phone within 4 business days after the clinic has received the results. If you do not hear from us within 7 days, please contact the clinic through MyChart or phone. If you have a critical or abnormal lab result, we will notify you by phone as soon as possible.  Submit refill requests through AVA.aihart or call your pharmacy and they will forward the refill request to us. Please allow 3 business days for your refill to be completed.          Additional Information About  "Your Visit        Game Nationhart Information     [employer] gives you secure access to your electronic health record. If you see a primary care provider, you can also send messages to your care team and make appointments. If you have questions, please call your primary care clinic.  If you do not have a primary care provider, please call [phone] and they will assist you.        Care EveryWhere ID     This is your Care EveryWhere ID. This could be used by other organizations to access your [place] medical records  [id number]        Your Vitals Were     Pulse Temperature Height Last Period Pulse Oximetry Breastfeeding?    86 97.5  F (36.4  C) (Oral) 5' 7\" (1.702 m) 01/08/2018 (Exact Date) 96% No    BMI (Body Mass Index)                   39.31 kg/m2            Blood Pressure from Last 3 Encounters:   01/31/18 118/76   11/08/17 138/77   08/21/17 108/72    Weight from Last 3 Encounters:   01/31/18 251 lb (113.9 kg)   11/08/17 258 lb 9 oz (117.3 kg)   08/21/17 284 lb 8 oz (129 kg)              We Performed the Following          ADMIN VACCINE, ADDL [09859]     CBC with platelets     Ferritin     HPV High Risk Types DNA Cervical     Iron and Iron Binding Capacity     Pap imaged thin layer screen with HPV - recommended age 30 - 65 years (select HPV order below)     Parathyroid Hormone Intact     TD (ADULT, 7+) PRESERVE FREE     Vitamin D Screen          Today's Medication Changes          These changes are accurate as of 1/31/18 11:55 AM.  If you have any questions, ask your nurse or doctor.               These medicines have changed or have updated prescriptions.        Dose/Directions    escitalopram 10 MG tablet   Commonly known as:  LEXAPRO   This may have changed:  See the new instructions.   Used for:  Generalized anxiety disorder   Changed by:  Krista Landaverde MD        Dose:  10 mg   Take 1 tablet (10 mg) by mouth daily   Quantity:  90 tablet   Refills:  1            Where to get your medicines      These medications " [place] [facility] [place] [address]  [address], [facility] [address]     Phone:  [phone]     escitalopram 10 MG tablet                Primary Care Provider Office Phone # Fax #    Krista Landaverde [phone]       [address]  [facility] [address]        Equal Access to Services     QIAN SANCHEZ : Hadii aad ku hadasho Soomaali, waaxda luqadaha, qaybta kaalmada adeegyada, waxay idiin hayaan adeeg kharash la'madina . So [facility] [phone].    ATENCIÓN: Si habla espcaitlyn, tiene a chirinos disposición servicios gratuitos de asistencia lingüística. [facility] [phone].    We comply with applicable federal civil rights laws and [place] laws. We do not discriminate on the basis of race, color, national origin, age, disability, sex, sexual orientation, or gender identity.            Thank you!     Thank you for choosing [facility]  for your care. Our goal is always to provide you with excellent care. Hearing back from our patients is one way we can continue to improve our services. Please take a few minutes to complete the written survey that you may receive in the mail after your visit with us. Thank you!             Your Updated Medication List - Protect others around you: Learn how to safely use, store and throw away your medicines at www.disposemymeds.org.          This list is accurate as of 1/31/18 11:55 AM.  Always use your most recent med list.                   Brand Name Dispense Instructions for use Diagnosis    B-12-SL SL      Place 1,000 mcg under the tongue daily        calcium citrate-vitamin D 315-250 MG-UNIT Tabs per tablet    CITRACAL     Take 2 tablets by mouth 2 times daily        escitalopram 10 MG tablet    LEXAPRO    90 tablet    Take 1 tablet (10 mg) by mouth daily    Generalized anxiety disorder       multivitamin  peds with iron 60 MG chewable tablet      Take 2 chew tab by mouth every morning         OMEPRAZOLE PO      Take 20 mg by mouth daily        TYLENOL PO      Take 650 mg by mouth every 8 hours as needed for mild pain or fever        VITAMIN D (CHOLECALCIFEROL) PO      Take 2,000 Units by mouth daily        VITRON-C PO      Take 1 tablet by mouth every morning

## 2018-02-01 LAB — DEPRECATED CALCIDIOL+CALCIFEROL SERPL-MC: 45 UG/L (ref 20–75)

## 2018-02-01 ASSESSMENT — ANXIETY QUESTIONNAIRES: GAD7 TOTAL SCORE: 0

## 2018-02-04 LAB
COPATH REPORT: NORMAL
PAP: NORMAL

## 2018-02-06 LAB
FINAL DIAGNOSIS: NORMAL
HPV HR 12 DNA CVX QL NAA+PROBE: NEGATIVE
HPV16 DNA SPEC QL NAA+PROBE: NEGATIVE
HPV18 DNA SPEC QL NAA+PROBE: NEGATIVE
SPECIMEN DESCRIPTION: NORMAL
SPECIMEN SOURCE CVX/VAG CYTO: NORMAL

## 2018-02-14 ENCOUNTER — OFFICE VISIT (OUTPATIENT)
Dept: SURGERY | Facility: CLINIC | Age: 33
End: 2018-02-14
Payer: COMMERCIAL

## 2018-02-14 VITALS — HEIGHT: 67 IN | BODY MASS INDEX: 39.87 KG/M2 | WEIGHT: 254 LBS

## 2018-02-14 VITALS
BODY MASS INDEX: 39.81 KG/M2 | DIASTOLIC BLOOD PRESSURE: 70 MMHG | WEIGHT: 254.19 LBS | HEART RATE: 68 BPM | SYSTOLIC BLOOD PRESSURE: 118 MMHG

## 2018-02-14 DIAGNOSIS — K21.9 GERD WITHOUT ESOPHAGITIS: ICD-10-CM

## 2018-02-14 DIAGNOSIS — E66.9 OBESITY (BMI 30-39.9): ICD-10-CM

## 2018-02-14 DIAGNOSIS — K91.2 POSTSURGICAL MALABSORPTION: ICD-10-CM

## 2018-02-14 DIAGNOSIS — Z98.84 BARIATRIC SURGERY STATUS: Primary | ICD-10-CM

## 2018-02-14 DIAGNOSIS — Z98.84 BARIATRIC SURGERY STATUS: ICD-10-CM

## 2018-02-14 PROBLEM — E66.01 MORBID OBESITY WITH BODY MASS INDEX OF 40.0-44.9 IN ADULT (H): Status: RESOLVED | Noted: 2017-05-10 | Resolved: 2018-02-14

## 2018-02-14 PROBLEM — R60.0 BILATERAL EDEMA OF LOWER EXTREMITY: Status: RESOLVED | Noted: 2017-05-10 | Resolved: 2018-02-14

## 2018-02-14 PROCEDURE — 97803 MED NUTRITION INDIV SUBSEQ: CPT | Performed by: DIETITIAN, REGISTERED

## 2018-02-14 PROCEDURE — 99213 OFFICE O/P EST LOW 20 MIN: CPT | Performed by: PHYSICIAN ASSISTANT

## 2018-02-14 NOTE — MR AVS SNAPSHOT
MRN:[id number]                      After Visit Summary   2/14/2018    Clarissa Paulino    MRN: [id number]           Visit Information        Provider Department      2/14/2018 10:30 AM 3, Sh Tabatha Diet, RD [facility]      Your next 10 appointments already scheduled     Aug 08, 2018 10:00 AM CDT   Annual Visit with Dlalas San PA-C   [facility] [place] ([facility])    [address] [place] [id number] [phone]            Aug 08, 2018 10:30 AM CDT   Annual Visit with Hannah Wl Diet 3, RD   [facility] [place] ([facility])    [address] [place] [id number] [phone]              MyChart Information     C[employer] gives you secure access to your electronic health record. If you see a primary care provider, you can also send messages to your care team and make appointments. If you have questions, please call your primary care clinic.  If you do not have a primary care provider, please call [phone] and they will assist you.        Care EveryWhere ID     This is your Care EveryWhere ID. This could be used by other organizations to access your [facility] medical records  [id number]        Equal Access to Services     QIAN SANCHEZ : Hadii john bower hadasho Sokenneyali, waaxda luqadaha, qaybta kaalmada adeegyada, soren shelley. So [facility] [phone].    ATENCIÓN: Si habla español, tiene a chirinos disposición servicios gratuitos de asistencia lingüística. Llame al [phone].    We comply with applicable federal civil rights laws and [place] laws. We do not discriminate on the basis of race, color, national origin, age, disability, sex, sexual orientation, or gender identity.

## 2018-02-14 NOTE — PROGRESS NOTES
"NUTRITION POST OP APPOINTMENT  DATE OF VISIT: February 14, 2018    Clarissa Paulino  1985  female  [id number]  32 year old     ASSESSMENT:    REASON FOR VISIT:  Clarissa is a 32 year old year old female presents today for 6 month PO nutrition follow-up appointment. Patient is accompanied by self.    DIAGNOSIS:  Status post gastric sleeve surgery.  Obesity Obesity Grade II BMI 35-39.9     ANTHROPOMETRICS:  Initial Weight: 298.4 lbs    Height: 170.2 cm (5' 7\")  Current Weight: 115.2 kg (254 lb)   BMI: 39.87 kg/(m^2).    VITAMINS AND MINERALS:  2 Multivitamin with Minerals  600 mg Calcium With Vitamin D BID  2000 International units Vitamin D  1000 mcg Vitamin B-12 sublingual  Daily Vitron C    NUTRITION HISTORY:  Breakfast: (within 1 hour of waking) Protein shake OR egg + hash browns + turkey sausage + small amount of cheese + ketchup   Lunch: salad (caesar - lettuce, chicken, dressing)  or salad wrap (chicken, jalepeno, tomato, cucumber, olives, dressing)  Supper: salads or protein (steak, chicken)  + vegetable OR turkey burger OR chicken tacos   Snacks: protein shake in the evenings, string cheese mid morning   Fluids consumed: Water, protein drinks, crystal light, powerade zero, decaf coffee occasionally  Consuming liquid calories: Yes  Protein intake: 60-90 grams/day  Tolerate regular texture food: Yes  Any foods not tolerated details: Yes  If any food not tolerated: pasta   Portion size: 1 cup or more   Take 20-30 minutes to consume each meal: Yes   Eat protein foods first: Yes  Fluids and meals separate by at least 30 minutes: Yes  Chew foods thoroughly: Yes  Tolerating diet: Yes  Drinking high protein supplements: Yes  Consuming snacks per day: 2  Additional Information: Pt overall feeling well however reports she becomes nauseous after eating solid breakfast. Suggested smaller portions and lower fat (ie eggs with small amount of cheese and vegetable rather than turkey sausage/hash browns OR yogurt " w/fruit). Also discouraged by WL plateau, however has changed up exercise routine which has helped somewhat. Recommended continue to challenge self with new forms of exercise and also stay within appropriate post-op calorie ranges.         PHYSICAL ACTIVITY:  Type: walking, circuit/HIIT   Frequency (days per week): 7  Duration (min): 30-60    DIAGNOSIS:  Previous Nutrition Diagnosis: Altered gastrointestinal function related to alteration in gastrointestinal structure as evidenced by history of gastric sleeve surgery.- no change    Previous goals:  Gradually increase fiber intake to 10-15 grams of fiber per day - not met  Have 1 serving of fruit per day- not met  Eat solid food for breakfst 4 X per week - improving    Current Nutrition Diagnosis: Altered gastrointestinal function related to alteration in gastrointestinal structure as evidenced by history of gastric sleeve surgery.    INTERVENTION:   Nutrition Prescription: Eat 3 meals a day at regular intervals. Consume 60-90 grams of protein daily. Follow post-surgical vitamin and mineral protocol.  Assessed learning needs and learning preferences.    GOALS:  Relating To Eating:  Have 3 food groups per meal.   Have milk instead of a protein drink for your evening snack  Aim for 600-800 kcals/day  Be mindful with high fat items like salad dressing and high fat meats  Reduce portions at breakfast and/or choose lean proteins    Follow-Up:   Recommend annual follow up visits to assist with lifestyle changes or per insurance.  Implementation: Discussed progress toward previous goals; reinforced importance of following bariatric lifestyle changes.    NUTRITION MONITORING AND EVALUATION:  Anticipated compliance: good  Verbalized goodunderstanding.    Follow up: Patient to follow up in 6 months, at 1 year post-op    TIME SPENT WITH PATIENT:  20 minutes    Iman Alberto RD, LD  Clinical Dietitian

## 2018-02-14 NOTE — PROGRESS NOTES
BARIATRIC FOLLOW UP VISIT     February 14, 2018       HISTORY OF PRESENT ILLNESS: Pt returns today for her follow-up appointment status post laparoscopic gastric sleeve.  Felt like she was stalling in the past 2 months. So she adjusted her exercise and has seen weight come in the past month.  Still taking GERD medications.  Is planning on getting  in June.      Initial Weight: 298 lb 4 oz (135.3 kg)   Current Weight: 254 lb 3 oz (115.3 kg)  Cumulative weight loss (lbs): 44.06  Last Visits Weight: 258 lb 9 oz (117.3 kg)     Patient is taking the following bariatric postoperative vitamins:  2 Complete multivitamins with minerals (at different times than calcium)   2000 International Units of Vitamin D daily  1200 mg of Calcium daily in divided doses  1000 mcg of Vitamin B12 sl daily  1 Iron/Vit C. daily    Pt is exercising everyday by walking 2 miles at work getting in at least 10,000 steps.  She works in a [employer] and walks around 8 times. Will do elliptical 3-4 times per week.  In the past month added on interval training that she is doing online.         OBESITY RELATED CONDITIONS:  Stress incontinence - resolved  Heartburn - still there.  Trialed off omeprazole but symptoms returned.  Tried going to zantac but symptoms returned.  So now taking omeprazole 20 m g once daily.  NO break through symptoms  Back pain - none       SOCIAL HISTORY:  Pt denies smoking.  Pt denies alcohol use.  Avoids NSAIDS.  One small cup of coffee every few weeks.      REVIEW OF SYSTEMS:     GI:  Nausea- No  Vomiting- No  Diarrhea- No  Constipation- No.  Takes in 64 oz daily  Dysphagia- No  Abdominal Pain- No  Heartburn- see above     SKIN:  Intertriginous irritation- No       PSYCH:  Depression- No  Anxiety- No      LABS/IMAGING/MEDICAL RECORDS REVIEW: labs from last month    PHYSICAL EXAMINATION:   /70  Pulse 68  Wt 254 lb 3 oz (115.3 kg)  BMI 39.81 kg/m2    GENERAL: Alert and oriented x3. NAD  HEART: No murmurs,  rubs or gallops, Regular rate and rhythm  LUNGS: Breathing unlabored, Lung sounds clear to auscultation bilaterally  ABDOMEN: soft; nontender; nondistended, incision well healed. No hernia  EXTREMITIES: No LE edema bilaterally, Gait normal  SKIN: No intertriginous irritation or rash      ASSESSMENT AND PLAN:      1 years status laparoscopic gastric sleeve  Morbid Obesity  - resolved  Obesity - Body mass index is 39.81 kg/(m^2).  Post surgical malabsorption:   Reviewed her labs she had drawn last month.  They all look great.   Ordered CBC, vitamin B12, vitamin D, PTH, ferritin, TIBC, and iron labs. - placed for your annual   Follow food plan per dietitian recommendations.   Continue taking recommended post-op vitamins.  Discussed with patient that along with the surgery this is a lifestyle.  If she is working on her diet and exercise she will be successful long term.    GERD - since she has continued with omeprazole daily will have her try another trial off and go to zantac daily.  If symptoms return again will have her continue with omeprazole until her annual appointment.   Stress incontinence -resolved  Return to clinic in 6 months.      I spent a total of 13 minutes face to face with Clarissa during today's office visit. Over 50% of this time was spent counseling the patient and/or coordinating care.

## 2018-02-14 NOTE — PATIENT INSTRUCTIONS
1 years status laparoscopic gastric sleeve  Body mass index is 39.81 kg/(m^2).  Post surgical malabsorption:   Reviewed her labs she had drawn last month.  They all look great.   Ordered CBC, vitamin B12, vitamin D, PTH, ferritin, TIBC, and iron labs. - placed for your annual   Follow food plan per dietitian recommendations.   Continue taking recommended post-op vitamins.  Discussed with patient that along with the surgery this is a lifestyle.  If she is working on her diet and exercise she will be successful long term.    GERD - since she has continued with omeprazole daily will have her try another trial off and go to zantac daily.  If symptoms return again will have her continue with omeprazole until her annual appointment.   Stress incontinence -resolved  Return to clinic in 6 months.

## 2018-03-23 ENCOUNTER — MYC MEDICAL ADVICE (OUTPATIENT)
Dept: FAMILY MEDICINE | Facility: CLINIC | Age: 33
End: 2018-03-23

## 2018-03-23 DIAGNOSIS — Z31.89 ENCOUNTER FOR FERTILITY PLANNING: Primary | ICD-10-CM

## 2018-03-23 NOTE — TELEPHONE ENCOUNTER
Reason for Call:  Other prescription    Detailed comments: patient wanted to start the medication she was asking for on Tuesday the 27th if this is approved. Can either respond to the My Chart message or call the above work # until 5pm today or cell phone [phone].      [facility] [place] - [place], [place] - [address]    Best Time: any    Can we leave a detailed message on this number? YES    Call taken on 3/23/2018 at 3:18 PM by Zulema Lundy

## 2018-03-26 ENCOUNTER — MYC MEDICAL ADVICE (OUTPATIENT)
Dept: FAMILY MEDICINE | Facility: CLINIC | Age: 33
End: 2018-03-26

## 2018-03-27 RX ORDER — CLOMIPHENE CITRATE 50 MG/1
50 TABLET ORAL DAILY
Qty: 5 TABLET | Refills: 0 | Status: SHIPPED | OUTPATIENT
Start: 2018-03-27 | End: 2018-08-21

## 2018-04-27 ENCOUNTER — TRANSFERRED RECORDS (OUTPATIENT)
Dept: HEALTH INFORMATION MANAGEMENT | Facility: CLINIC | Age: 33
End: 2018-04-27

## 2018-05-24 ENCOUNTER — TRANSFERRED RECORDS (OUTPATIENT)
Dept: HEALTH INFORMATION MANAGEMENT | Facility: CLINIC | Age: 33
End: 2018-05-24

## 2018-08-03 ENCOUNTER — TRANSFERRED RECORDS (OUTPATIENT)
Dept: HEALTH INFORMATION MANAGEMENT | Facility: CLINIC | Age: 33
End: 2018-08-03

## 2018-08-08 ENCOUNTER — OFFICE VISIT (OUTPATIENT)
Dept: SURGERY | Facility: CLINIC | Age: 33
End: 2018-08-08
Payer: COMMERCIAL

## 2018-08-08 VITALS
HEIGHT: 67 IN | OXYGEN SATURATION: 98 % | RESPIRATION RATE: 12 BRPM | HEART RATE: 67 BPM | DIASTOLIC BLOOD PRESSURE: 79 MMHG | BODY MASS INDEX: 35.77 KG/M2 | WEIGHT: 227.9 LBS | SYSTOLIC BLOOD PRESSURE: 129 MMHG

## 2018-08-08 VITALS — BODY MASS INDEX: 35.78 KG/M2 | HEIGHT: 67 IN | WEIGHT: 227.96 LBS

## 2018-08-08 DIAGNOSIS — E66.9 OBESITY (BMI 30-39.9): ICD-10-CM

## 2018-08-08 DIAGNOSIS — K21.9 GERD WITHOUT ESOPHAGITIS: ICD-10-CM

## 2018-08-08 DIAGNOSIS — K91.2 POSTSURGICAL MALABSORPTION: ICD-10-CM

## 2018-08-08 DIAGNOSIS — Z98.84 BARIATRIC SURGERY STATUS: ICD-10-CM

## 2018-08-08 DIAGNOSIS — Z98.84 BARIATRIC SURGERY STATUS: Primary | ICD-10-CM

## 2018-08-08 PROCEDURE — 99213 OFFICE O/P EST LOW 20 MIN: CPT | Performed by: PHYSICIAN ASSISTANT

## 2018-08-08 PROCEDURE — 97803 MED NUTRITION INDIV SUBSEQ: CPT | Performed by: DIETITIAN, REGISTERED

## 2018-08-08 NOTE — MR AVS SNAPSHOT
"              After Visit Summary   8/8/2018    Clarissa Paulino    MRN: [id number]           Patient Information     Date Of Birth          1985        Visit Information        Provider Department      8/8/2018 10:00 AM Dallas San PA-C [facility]      Today's Diagnoses     Bariatric surgery status    -  1    Postsurgical malabsorption        GERD without esophagitis        Obesity (BMI 30-39.9)           Follow-ups after your visit        Who to contact     If you have questions or need follow up information about today's clinic visit or your schedule please contact [facility] directly at [phone].  Normal or non-critical lab and imaging results will be communicated to you by AxioMxhart, letter or phone within 4 business days after the clinic has received the results. If you do not hear from us within 7 days, please contact the clinic through AxioMxhart or phone. If you have a critical or abnormal lab result, we will notify you by phone as soon as possible.  Submit refill requests through OQVestir or call your pharmacy and they will forward the refill request to us. Please allow 3 business days for your refill to be completed.          Additional Information About Your Visit        MyChart Information     OQVestir gives you secure access to your electronic health record. If you see a primary care provider, you can also send messages to your care team and make appointments. If you have questions, please call your primary care clinic.  If you do not have a primary care provider, please call [phone] and they will assist you.        Care EveryWhere ID     This is your Care EveryWhere ID. This could be used by other organizations to access your [facility] medical records  [id number]        Your Vitals Were     Pulse Respirations Height Pulse Oximetry BMI (Body Mass Index)       67 12 5' 7\" " (1.702 m) 98% 35.69 kg/m2        Blood Pressure from Last 3 Encounters:   08/08/18 129/79   02/14/18 118/70   01/31/18 118/76    Weight from Last 3 Encounters:   08/08/18 227 lb 15.3 oz (103.4 kg)   08/08/18 227 lb 14.4 oz (103.4 kg)   02/14/18 254 lb (115.2 kg)              Today, you had the following     No orders found for display       Primary Care Provider Office Phone # Fax #    Krista Landaverde [phone]       [address]  [place] [facility] [address]        Equal Access to Services     B[facility] System[facility]IBETH : Hadii john Broderick, wamanoj phan, topher kaalmada alejandra, soren shelley. So [facility] [phone].    ATENCIÓN: Si habla español, tiene a chirinos disposición servicios gratuitos de asistencia lingüística. Llame al [phone].    We comply with applicable federal civil rights laws and [place] laws. We do not discriminate on the basis of race, color, national origin, age, disability, sex, sexual orientation, or gender identity.            Thank you!     Thank you for choosing [facility]  for your care. Our goal is always to provide you with excellent care. Hearing back from our patients is one way we can continue to improve our services. Please take a few minutes to complete the written survey that you may receive in the mail after your visit with us. Thank you!             Your Updated Medication List - Protect others around you: Learn how to safely use, store and throw away your medicines at www.disposemymeds.org.          This list is accurate as of 8/8/18 10:33 AM.  Always use your most recent med list.                   Brand Name Dispense Instructions for use Diagnosis    B-12-SL SL      Place 1,000 mcg under the tongue daily        calcium citrate-vitamin D 315-250 MG-UNIT Tabs per tablet    CITRACAL     Take 2 tablets by mouth 2 times daily        clomiPHENE 50 MG tablet    CLOMID    5 tablet    Take 1 tablet (50 mg)  by mouth daily for 5 days Begin on or about the fifth day of the cycle.    Encounter for fertility planning       escitalopram 10 MG tablet    LEXAPRO    90 tablet    Take 1 tablet (10 mg) by mouth daily    Generalized anxiety disorder       multivitamin  peds with iron 60 MG chewable tablet      Take 2 chew tab by mouth every morning        OMEPRAZOLE PO      Take 20 mg by mouth daily        TYLENOL PO      Take 650 mg by mouth every 8 hours as needed for mild pain or fever        VITAMIN D (CHOLECALCIFEROL) PO      Take 2,000 Units by mouth daily        VITRON-C PO      Take 1 tablet by mouth every morning

## 2018-08-08 NOTE — PROGRESS NOTES
"NUTRITION POST OP APPOINTMENT  DATE OF VISIT: August 8, 2018    Clarissa Paulino  1985  female  [id number]  32 year old     ASSESSMENT:    REASON FOR VISIT:  Clarissa is a 32 year old year old female presents today for 1 year PO nutrition follow-up appointment. Patient is accompanied by self.    DIAGNOSIS:  Status post gastric sleeve surgery.  Obesity Grade II BMI 35-39.9     ANTHROPOMETRICS:  Initial Weight: 135.4 kg (298 lb 6.4 oz)  Height: 170.2 cm (5' 7\")  Current Weight: 103.4 kg (227 lb 15.3 oz)   BMI: 35.78 kg/(m^2).    VITAMINS AND MINERALS:  2 Multivitamin with Minerals  600 mg Calcium With Vitamin D BID  2000 International units Vitamin D  1000 mcg Vitamin B-12 sublingual  1 Vitron C    NUTRITION HISTORY:  Breakfast: 1 egg, turkey sausage, 1/2 slice toast or mini bagel or hash browns or Protein drink (30 g protein)  Lunch: left over tacos or wrap or lettuce salad with protein, sometimes fruit  Supper: soft shell taco made with ground chicken  or bowl or steak, steamed veggie, some times grilled corn or fruit  Snacks: rarely or 1 string cheese or turkey beef stick  Fluids consumed: Water, Crystal Light and Protein Drink, occasional 1/2 caf coffee  Consuming liquid calories: Yes  Protein intake: 50-65 grams/day  Tolerate regular texture food: Yes  Any foods not tolerated details: No  Portion size: 1 cup  Take 20-30 minutes to consume each meal: Yes   Eat protein foods first: Yes  Fluids and meals separate by at least 30 minutes: Yes  Chew foods thoroughly: Yes  Tolerating diet: Yes  Drinking high protein supplements: Yes  Consuming snacks per day: 0-1  Additional Information: Patient shred that she got  in the last 6 months, but is has not impacted her eating pattern. Spouse works for a salad company and she often gets salad and wraps from his work. Discussed issues around eating higher fat foods (noted had ED visit on 4/29/18-biliary colic) such as pigs in a blanket, full fat Caesar salad " dressing. Patient is  pleased with her weight loss, but would like to keep her weight off.         PHYSICAL ACTIVITY:  Type: interval training videos/ walking  Frequency (days per week): 3/ 6  Duration (min): 30-45/ 30    DIAGNOSIS:  Previous Nutrition Diagnosis: Altered gastrointestinal function related to alteration in gastrointestinal structure as evidenced by history of gastric sleeve surgery.- no change    Previous goals:   Have 3 food groups per meal. -met  Have milk instead of a protein drink for your evening snack-met  Aim for 600-800 kcals/day-met / closer to 800 kcal  Be mindful with high fat items like salad dressing and high fat meats-not met  Reduce portions at breakfast and/or choose lean proteins-met    Current Nutrition Diagnosis: Altered gastrointestinal function related to alteration in gastrointestinal structure as evidenced by history of gastric sleeve surgery.    INTERVENTION:   Nutrition Prescription: Eat 3 meals a day at regular intervals. Consume 60-90 grams of protein daily. Follow post-surgical vitamin and mineral protocol.  Assessed learning needs and learning preferences.    GOALS:  Relating To Eating:  Replace snacks with up to 16 ounces of milk per day or an approved protein drink  Aim for a minimum of 60 g protein per day  Avoid high fat foods/ use 3 gram of fat rule per 100 calories (reviewed and written)    Implementation: Discussed progress toward previous goals; reinforced importance of following bariatric lifestyle changes.    NUTRITION MONITORING AND EVALUATION:  Anticipated compliance: fair  Verbalized fair-good understanding.    Follow up: Patient to follow up in 12 months.    TIME SPENT WITH PATIENT:  25 minutes    Tod Quiñones RD, LD  [facility]  [phone]

## 2018-08-08 NOTE — PROGRESS NOTES
BARIATRIC FOLLOW UP VISIT     August 8, 2018       HISTORY OF PRESENT ILLNESS: Pt returns today for her follow-up appointment status post laparoscopic gastric sleeve.  Was seen in  ED 4/2018 for lower chest/upper abdomen sharp pain that lasted for 5-6 minutes.  Radiated around rib cage.  The second she sat down in car to go to the ED it went away.  US of abdomen was done.  She was told cholelithiasis. Did see general surgeon after but he was not convinced gall stones.  Had a mild attack about a week later.  Lasted maybe 2 minutes.  The first attack was a half hour after eating dinner which was hot dogs in a bun.  The second attack was 30 minutes after a juan ramon salad.  Has not had any more occurances.  Has eaten both of these foods again with out issues.   Continues to take omeprazole 20 mg once daily.  Has no break through with this.  Has tried a couple times to go to zantac 300 mg twice daily but was having break through.      Initial Weight: 298 lb 6.4 oz (135.4 kg)   Current Weight: 227 lb 14.4 oz (103.4 kg)  Cumulative weight loss (lbs): 70.5  Last Visits Weight: 254 lb (115.2 kg)     Patient is taking the following bariatric postoperative vitamins:  2 Complete multivitamins with minerals (at different times than calcium)   2000 International Units of Vitamin D daily  1200 mg of Calcium daily in divided doses  1000 mcg of Vitamin B12 sl daily  1 Iron/Vit C. daily    Pt is exercising two-three times weekly does workout videos at home.  Walking 2-3 miles about 6-7 days per week.       OBESITY RELATED CONDITIONS:  Stress incontinence - resolved  [place] - on omeprazole  backpain- none       SOCIAL HISTORY:  Pt denies smoking.  Pt denies alcohol use.  Avoids NSAIDS.  Every now and then coffee      REVIEW OF SYSTEMS:     GI:  Nausea- once every 3-4 weeks.   Vomiting- No  Diarrhea- No  Constipation- Takes senna as needed.  Drinks 48-64 oz of water  Dysphagia- No  Abdominal Pain- see above  Heartburn- Ongoing padgett    "  SKIN:  Intertriginous irritation- No  Hair loss - No       PSYCH:  Depression- No. On meds  Anxiety- No      LABS/IMAGING/MEDICAL RECORDS REVIEW: Did not get 6 month labs.  Will get done today    PHYSICAL EXAMINATION:   /79 (BP Location: Left arm, Patient Position: Sitting, Cuff Size: Adult Regular)  Pulse 67  Resp 12  Ht 5' 7\" (1.702 m)  Wt 227 lb 14.4 oz (103.4 kg)  SpO2 98%  BMI 35.69 kg/m2      GENERAL: Alert and oriented x3. NAD  HEART: No murmurs, rubs or gallops, Regular rate and rhythm  LUNGS: Breathing unlabored, Lung sounds clear to auscultation bilaterally  ABDOMEN: soft; nontender; nondistended, incision well healed. No hernia  EXTREMITIES: No LE edema bilaterally, Gait normal  SKIN: No intertriginous irritation or rash      ASSESSMENT AND PLAN:      1 years status laparoscopic gastric sleeve  Morbid Obesity - resolved  Obesity - Body mass index is 35.69 kg/(m^2)..  Post surgical malabsorption:   HAS OUTSTANDING orders for CBC, vitamin B12, vitamin D, PTH, ferritin,  TIBC, and iron labs - Pt said will get drawn today   Follow food plan per dietitian recommendations.   Continue taking recommended post-op vitamins.  GERD without esophagitis - continue with omeprazole 20 mg daily.  Constipation - continue with current routine  Patient is actively trying to get pregnant.  Advised her to let this clinic know if she does become pregnant  Discussed if any abdominal issues should be seen in ED if emergency otherwise to call clinic.   Return to clinic in 1 year.      I spent a total of 17 minutes face to face with Clarissa during today's office visit. Over 50% of this time was spent counseling the patient and/or coordinating care.  "

## 2018-08-09 DIAGNOSIS — K91.2 POSTSURGICAL MALABSORPTION: ICD-10-CM

## 2018-08-09 DIAGNOSIS — Z98.84 BARIATRIC SURGERY STATUS: ICD-10-CM

## 2018-08-09 LAB
ERYTHROCYTE [DISTWIDTH] IN BLOOD BY AUTOMATED COUNT: 11.7 % (ref 10–15)
HCT VFR BLD AUTO: 37.3 % (ref 35–47)
HGB BLD-MCNC: 13 G/DL (ref 11.7–15.7)
MCH RBC QN AUTO: 32.7 PG (ref 26.5–33)
MCHC RBC AUTO-ENTMCNC: 34.9 G/DL (ref 31.5–36.5)
MCV RBC AUTO: 94 FL (ref 78–100)
PLATELET # BLD AUTO: 245 10E9/L (ref 150–450)
RBC # BLD AUTO: 3.97 10E12/L (ref 3.8–5.2)
WBC # BLD AUTO: 12.5 10E9/L (ref 4–11)

## 2018-08-09 PROCEDURE — 82607 VITAMIN B-12: CPT | Performed by: PHYSICIAN ASSISTANT

## 2018-08-09 PROCEDURE — 83970 ASSAY OF PARATHORMONE: CPT | Performed by: PHYSICIAN ASSISTANT

## 2018-08-09 PROCEDURE — 83540 ASSAY OF IRON: CPT | Performed by: PHYSICIAN ASSISTANT

## 2018-08-09 PROCEDURE — 83550 IRON BINDING TEST: CPT | Performed by: PHYSICIAN ASSISTANT

## 2018-08-09 PROCEDURE — 82306 VITAMIN D 25 HYDROXY: CPT | Performed by: PHYSICIAN ASSISTANT

## 2018-08-09 PROCEDURE — 36415 COLL VENOUS BLD VENIPUNCTURE: CPT | Performed by: PHYSICIAN ASSISTANT

## 2018-08-09 PROCEDURE — 82728 ASSAY OF FERRITIN: CPT | Performed by: PHYSICIAN ASSISTANT

## 2018-08-09 PROCEDURE — 85027 COMPLETE CBC AUTOMATED: CPT | Performed by: PHYSICIAN ASSISTANT

## 2018-08-10 LAB
DEPRECATED CALCIDIOL+CALCIFEROL SERPL-MC: 56 UG/L (ref 20–75)
FERRITIN SERPL-MCNC: 97 NG/ML (ref 12–150)
IRON SATN MFR SERPL: 40 % (ref 15–46)
IRON SERPL-MCNC: 105 UG/DL (ref 35–180)
PTH-INTACT SERPL-MCNC: 53 PG/ML (ref 18–80)
TIBC SERPL-MCNC: 265 UG/DL (ref 240–430)
VIT B12 SERPL-MCNC: 875 PG/ML (ref 193–986)

## 2018-08-21 ENCOUNTER — OFFICE VISIT (OUTPATIENT)
Dept: SURGERY | Facility: CLINIC | Age: 33
End: 2018-08-21
Payer: COMMERCIAL

## 2018-08-21 VITALS
SYSTOLIC BLOOD PRESSURE: 110 MMHG | HEIGHT: 67 IN | BODY MASS INDEX: 35.63 KG/M2 | WEIGHT: 227 LBS | DIASTOLIC BLOOD PRESSURE: 70 MMHG | HEART RATE: 65 BPM

## 2018-08-21 DIAGNOSIS — K80.20 SYMPTOMATIC CHOLELITHIASIS: ICD-10-CM

## 2018-08-21 PROCEDURE — 99204 OFFICE O/P NEW MOD 45 MIN: CPT | Performed by: SURGERY

## 2018-08-21 RX ORDER — HYDROCODONE BITARTRATE AND ACETAMINOPHEN 5; 325 MG/1; MG/1
1 TABLET ORAL EVERY 6 HOURS PRN
COMMUNITY
End: 2018-08-29

## 2018-08-21 NOTE — MR AVS SNAPSHOT
"              After Visit Summary   8/21/2018    Clarissa Paulino    MRN: [id number]           Patient Information     Date Of Birth          1985        Visit Information        Provider Department      8/21/2018 9:30 AM Kenneth Jo MD [facility]       Follow-ups after your visit        Who to contact     If you have questions or need follow up information about today's clinic visit or your schedule please contact [facility] directly at [phone].  Normal or non-critical lab and imaging results will be communicated to you by Utterzhart, letter or phone within 4 business days after the clinic has received the results. If you do not hear from us within 7 days, please contact the clinic through Peg Bandwidtht or phone. If you have a critical or abnormal lab result, we will notify you by phone as soon as possible.  Submit refill requests through Tangler or call your pharmacy and they will forward the refill request to us. Please allow 3 business days for your refill to be completed.          Additional Information About Your Visit        MyChart Information     Tangler gives you secure access to your electronic health record. If you see a primary care provider, you can also send messages to your care team and make appointments. If you have questions, please call your primary care clinic.  If you do not have a primary care provider, please call [phone] and they will assist you.        Care EveryWhere ID     This is your Care EveryWhere ID. This could be used by other organizations to access your [place] medical records  [id number]        Your Vitals Were     Pulse Height BMI (Body Mass Index)             65 5' 7\" (1.702 m) 35.55 kg/m2          Blood Pressure from Last 3 Encounters:   08/21/18 110/70   08/08/18 129/79   02/14/18 118/70    Weight from Last 3 Encounters:   08/21/18 227 lb (103 kg)   08/08/18 227 lb 15.3 oz " (103.4 kg)   08/08/18 227 lb 14.4 oz (103.4 kg)              Today, you had the following     No orders found for display      Information about OPIOIDS     PRESCRIPTION OPIOIDS: WHAT YOU NEED TO KNOW   We gave you an opioid (narcotic) pain medicine. It is important to manage your pain, but opioids are not always the best choice. You should first try all the other options your care team gave you. Take this medicine for as short a time (and as few doses) as possible.    Some activities can increase your pain, such as bandage changes or therapy sessions. It may help to take your pain medicine 30 to 60 minutes before these activities. Reduce your stress by getting enough sleep, working on hobbies you enjoy and practicing relaxation or meditation. Talk to your care team about ways to manage your pain beyond prescription opioids.    These medicines have risks:    DO NOT drive when on new or higher doses of pain medicine. These medicines can affect your alertness and reaction times, and you could be arrested for driving under the influence (DUI). If you need to use opioids long-term, talk to your care team about driving.    DO NOT operate heavy machinery    DO NOT do any other dangerous activities while taking these medicines.    DO NOT drink any alcohol while taking these medicines.     If the opioid prescribed includes acetaminophen, DO NOT take with any other medicines that contain acetaminophen. Read all labels carefully. Look for the word  acetaminophen  or  Tylenol.  Ask your pharmacist if you have questions or are unsure.    You can get addicted to pain medicines, especially if you have a history of addiction (chemical, alcohol or substance dependence). Talk to your care team about ways to reduce this risk.    All opioids tend to cause constipation. Drink plenty of water and eat foods that have a lot of fiber, such as fruits, vegetables, prune juice, apple juice and high-fiber cereal. Take a laxative (Miralax, milk  of magnesia, Colace, Senna) if you don t move your bowels at least every other day. Other side effects include upset stomach, sleepiness, dizziness, throwing up, tolerance (needing more of the medicine to have the same effect), physical dependence and slowed breathing.    Store your pills in a secure place, locked if possible. We will not replace any lost or stolen medicine. If you don t finish your medicine, please throw away (dispose) as directed by your pharmacist. The [place] Pollution Control Agency has more information about safe disposal: [email or web address]/living-[email or web address]/[email or web address]-unwanted-medications         Primary Care Provider Office Phone # Fax #    Krista Landaverde [phone]       [address]  [place] [address]        Equal Access to Services     [facility] Center[facility]IBETH : Alec lunsfordo Soeusebio, waaxda luqadaha, qaybta kaalmada alejandra, soren davila . So [place] -[phone].    ATENCIÓN: Si habla español, tiene a chirinos disposición servicios gratuitos de asistencia lingüística. Jay Jay al [phone].    We comply with applicable federal civil rights laws and [place] laws. We do not discriminate on the basis of race, color, national origin, age, disability, sex, sexual orientation, or gender identity.            Thank you!     Thank you for choosing [facility]  for your care. Our goal is always to provide you with excellent care. Hearing back from our patients is one way we can continue to improve our services. Please take a few minutes to complete the written survey that you may receive in the mail after your visit with us. Thank you!             Your Updated Medication List - Protect others around you: Learn how to safely use, store and throw away your medicines at www.disposemymeds.org.          This list is accurate as of 8/21/18  9:47 AM.  Always use your most recent med list.                   Brand Name Dispense Instructions  for use Diagnosis    B-12-SL SL      Place 1,000 mcg under the tongue daily        calcium citrate-vitamin D 315-250 MG-UNIT Tabs per tablet    CITRACAL     Take 2 tablets by mouth 2 times daily        escitalopram 10 MG tablet    LEXAPRO    90 tablet    Take 1 tablet (10 mg) by mouth daily    Generalized anxiety disorder       HYDROcodone-acetaminophen 5-325 MG per tablet    NORCO     Take 1 tablet by mouth every 6 hours as needed for severe pain        multivitamin  peds with iron 60 MG chewable tablet      Take 2 chew tab by mouth every morning        OMEPRAZOLE PO      Take 20 mg by mouth daily        VITAMIN D (CHOLECALCIFEROL) PO      Take 2,000 Units by mouth daily        VITRON-C PO      Take 1 tablet by mouth every morning        ZOFRAN PO      Take 4 mg by mouth

## 2018-08-22 ENCOUNTER — TELEPHONE (OUTPATIENT)
Dept: SURGERY | Facility: CLINIC | Age: 33
End: 2018-08-22

## 2018-08-22 PROBLEM — K80.20 SYMPTOMATIC CHOLELITHIASIS: Status: ACTIVE | Noted: 2018-08-22

## 2018-08-22 NOTE — PROGRESS NOTES
Surgery Consultation, [facility] TOYA Jo MD    Clarissa Paulino MRN# [id number]   YOB: 1985 Age: 32 year old     PCP:  Krista Landaverde [phone]    Chief Complaint:  Right upper quadrant pain, nausea    History of Present Illness:  Clarissa Paulino is a 32 year old female who presented with several episodes of upper abdominal pain and intermittent nausea, usually after eating.  Commonly associated with eating greasy foods.  She underwent a laparoscopic gastric sleeve last year and has had adequate weight loss.  She was not noted to have symptomatic gallstones at the time of her surgery.  Since that time, however, she has had increasing episodes of upper abdominal pain and nausea.  She does take a proton pump inhibitor regularly but has not noted any improvement in her symptoms.  No significant emesis.  No history of jaundice or acholic stools.  Patient is a pharmacist.  The patient is now here to discuss diagnosis and management options.    PMH:  Clarissa Paulino  has a past medical history of Gastroesophageal reflux disease.  PSH:  Clarissa Paulino  has a past surgical history that includes orthopedic surgery (1991) and Laparoscopic gastric sleeve (N/A, 8/7/2017).    Home medications and allergies reviewed.    Social History:  Clarissa Paulino  reports that she has never smoked. She has never used smokeless tobacco. She reports that she does not drink alcohol or use illicit drugs.  Family History:  Clarissa Paulino family history includes Anxiety Disorder in her brother, mother, and sister; Depression/Anxiety in her mother and sister; Obesity in her maternal grandmother and mother; Other Cancer in her maternal grandmother; Prostate Cancer in her paternal grandfather; Thyroid Disease in her mother and mother.    ROS:  The 10 point Review of Systems is negative other than noted in the HPI.  See above.    Physical Exam:  Blood pressure 110/70, pulse 65,  "height 1.702 m (5' 7\"), weight 103 kg (227 lb), not currently breastfeeding.  227 lbs 0 oz  Overweight female in no distress.  Patient has a pleasant affect, speaks without difficulty.   Pupils equal round and reactive to light.   No cervical lymphadenopathy or thyromegaly.   Lung fields clear, breathing comfortably.   Heart normal sinus rhythm.  No murmurs rubs or gallops.  Abdomen soft, nontender, nondistended.  Minimal tenderness in the right upper quadrant, no masses appreciated. No peritoneal signs or rebound.  Skin warm, dry, without rashes or lesions.    All new lab and imaging data was reviewed.  Abdominal ultrasound shows gallbladder with multiple stones in the gallbladder neck.  No evidence for cholecystitis     Assessment/plan:  Clarissa Paulino is a 32 year old female with signs and symptoms suggesting symptomatic cholelithiasis. I've recommended laparoscopic cholecystectomy. Surgical comorbidities include previous abdominal surgery, obesity.  She understands that her previous forgot surgery may make surgery more challenging but I have reassured her that it is still quite safe and unlikely to have a significant impact.  I feel the patient is a good candidate for the surgery and that this should be able to be done as an outpatient. They were going to consider their options and likely schedule surgery.    Scott Jo M.D.  [facility]  [phone]    Please route or send letter to:  Primary Care Provider (PCP) and Referring Provider  "

## 2018-08-22 NOTE — TELEPHONE ENCOUNTER
Type of surgery: Lap cherelle  Location of surgery: [facility]  Date and time of surgery: 9/4/18 at 2:30pm  Surgeon: Dr. Kenneth Jo  Pre-Op Appt Date: Patient to schedule  Post-Op Appt Date: Patient to schedule   Packet sent out: Yes  Pre-cert/Authorization completed:  Not Applicable  Date: 8/21/18

## 2018-08-29 ENCOUNTER — OFFICE VISIT (OUTPATIENT)
Dept: FAMILY MEDICINE | Facility: CLINIC | Age: 33
End: 2018-08-29
Payer: COMMERCIAL

## 2018-08-29 VITALS
HEIGHT: 67 IN | SYSTOLIC BLOOD PRESSURE: 116 MMHG | WEIGHT: 227 LBS | DIASTOLIC BLOOD PRESSURE: 76 MMHG | TEMPERATURE: 97.5 F | HEART RATE: 70 BPM | OXYGEN SATURATION: 97 % | BODY MASS INDEX: 35.63 KG/M2

## 2018-08-29 DIAGNOSIS — Z01.818 PREOP GENERAL PHYSICAL EXAM: Primary | ICD-10-CM

## 2018-08-29 DIAGNOSIS — K80.20 SYMPTOMATIC CHOLELITHIASIS: ICD-10-CM

## 2018-08-29 PROCEDURE — 99214 OFFICE O/P EST MOD 30 MIN: CPT | Performed by: PREVENTIVE MEDICINE

## 2018-08-29 ASSESSMENT — PAIN SCALES - GENERAL: PAINLEVEL: NO PAIN (0)

## 2018-08-29 NOTE — PATIENT INSTRUCTIONS
At [facility], we strive to deliver an exceptional experience to you, every time we see you.  If you receive a survey in the mail, please send us back your thoughts. We really do value your feedback.    Based on your medical history, these are the current health maintenance/preventive care services that you are due for (some may have been done at this visit.)  Health Maintenance Due   Topic Date Due     HIV SCREEN (SYSTEM ASSIGNED)  11/21/2002       Suggested websites for health information:  Www.[employer].org : Up to date and easily searchable information on multiple topics.  Www.medlineplus.gov : medication info, interactive tutorials, watch real surgeries online  Www.familydoctor.org : good info from the Academy of Family Physicians  Www.cdc.gov : public health info, travel advisories, epidemics (H1N1)  Www.aap.org : children's health info, normal development, vaccinations  Www.health.[facility].[place] : [place] dept of health, public health issues in [place], N1N1    Your care team:                            Family Medicine Internal Medicine   MD Ankur Hogue MD Shantel Branch-Fleming, MD Katya Georgiev PA-C Megan Hill, APRN CNP    Venkata Dykes MD Pediatrics   Rusty Donovan, PAOttoC  Hanna Elise, CNP MD Gabriela Rosario APRN CNP   MD Lynette Howard MD Deborah Mielke, MD Kim Thein, APRN CNP      Clinic hours: Monday - Thursday 7 am-7 pm; Fridays 7 am-5 pm.   Urgent care: Monday - Friday 11 am-9 pm; Saturday and Sunday 9 am-5 pm.  Pharmacy : Monday -Thursday 8 am-8 pm; Friday 8 am-6 pm; Saturday and Sunday 9 am-5 pm.     Clinic: [phone]   Pharmacy: [phone]        Before Your Surgery      Call your surgeon if there is any change in your health. This includes signs of a cold or flu (such as a sore throat, runny nose, cough, rash or fever).    Do not smoke, drink alcohol or take over the counter medicine (unless your surgeon or primary care  doctor tells you to) for the 24 hours before and after surgery.    If you take prescribed drugs: Follow your doctor s orders about which medicines to take and which to stop until after surgery.    Eating and drinking prior to surgery: follow the instructions from your surgeon    Take a shower or bath the night before surgery. Use the soap your surgeon gave you to gently clean your skin. If you do not have soap from your surgeon, use your regular soap. Do not shave or scrub the surgery site.  Wear clean pajamas and have clean sheets on your bed.

## 2018-08-29 NOTE — H&P (VIEW-ONLY)
[address] [address]  [phone]  Dept: [phone]    PRE-OP EVALUATION:  Today's date: 2018    Clarissa Paulino (: 1985) presents for pre-operative evaluation assessment as requested by Dr. Jo.  She requires evaluation and anesthesia risk assessment prior to undergoing surgery/procedure for treatment of Gallstones .    Proposed Surgery/ Procedure: LAPAROSCOPIC CHOLECYSTECTOMY   Date of Surgery/ Procedure: 18  Time of Surgery/ Procedure: 2:30 pm  Hospital/Surgical Facility: [facility]  Fax number for surgical facility: [phone]  Primary Physician: Krista Landaverde  Type of Anesthesia Anticipated: General    Patient has a Health Care Directive or Living Will:  NO    1. NO - Do you have a history of heart attack, stroke, stent, bypass or surgery on an artery in the head, neck, heart or legs?  2. NO - Do you ever have any pain or discomfort in your chest?  3. NO - Do you have a history of  Heart Failure?  4. NO - Are you troubled by shortness of breath when: walking on the level, up a slight hill or at night?  5. NO - Do you currently have a cold, bronchitis or other respiratory infection?  6. NO - Do you have a cough, shortness of breath or wheezing?  7. NO - Do you sometimes get pains in the calves of your legs when you walk?  8. YES - Do you or anyone in your family have previous history of blood clots?Mother with DVT and PE in her 50s, hypercoagulation work up was negative, believed to be due to exogenous hormones   9. NO - Do you or does anyone in your family have a serious bleeding problem such as prolonged bleeding following surgeries or cuts?  10. NO - Have you ever had problems with anemia or been told to take iron pills?  11. NO - Have you had any abnormal blood loss such as black, tarry or bloody stools, or abnormal vaginal bleeding?  12. NO - Have you ever had a blood transfusion?  13. NO - Have you or any  of your relatives ever had problems with anesthesia?  14. NO - Do you have sleep apnea, excessive snoring or daytime drowsiness?  15. NO - Do you have any prosthetic heart valves?  16. NO - Do you have prosthetic joints?  17. NO - Is there any chance that you may be pregnant?      HPI:     HPI related to upcoming procedure: 32 year old female with right upper quadrant pain and nausea usually after eating. Was noted to have symptomatic gallstones at time of laparoscopic gastric sleeve last year.       See problem list for active medical problems.  Problems all longstanding and stable, except as noted/documented.  See ROS for pertinent symptoms related to these conditions.                                                                                                                                                          .    MEDICAL HISTORY:     Patient Active Problem List    Diagnosis Date Noted     Symptomatic cholelithiasis 08/22/2018     Priority: Medium     Obesity (BMI 30-39.9) 02/14/2018     Priority: Medium     GERD without esophagitis 02/14/2018     Priority: Medium     Postsurgical malabsorption 02/14/2018     Priority: Medium     Bariatric surgery status 08/15/2017     Priority: Medium     History of infertility 05/10/2017     Priority: Medium     MONTEZ (generalized anxiety disorder) 03/28/2016     Priority: Medium     CARDIOVASCULAR SCREENING; LDL GOAL LESS THAN 160 10/31/2010     Priority: Medium      Past Medical History:   Diagnosis Date     Gastroesophageal reflux disease      Past Surgical History:   Procedure Laterality Date     LAPAROSCOPIC GASTRIC SLEEVE N/A 8/7/2017    Procedure: LAPAROSCOPIC GASTRIC SLEEVE;  LAPAROSCOPIC GASTRIC SLEEVE;  Surgeon: Sloan Burnette MD;  Location:  OR     ORTHOPEDIC SURGERY  1991    Left elbow     Current Outpatient Prescriptions   Medication Sig Dispense Refill     calcium citrate-vitamin D (CITRACAL) 315-250 MG-UNIT TABS per tablet Take 2 tablets by mouth 2  "times daily        Cyanocobalamin (B-12-SL SL) Place 1,000 mcg under the tongue daily        escitalopram (LEXAPRO) 10 MG tablet Take 1 tablet (10 mg) by mouth daily 90 tablet 1     Iron-Vitamin C (VITRON-C PO) Take 1 tablet by mouth every morning       multivitamin  peds with iron (FLINTSTONES COMPLETE) 60 MG chewable tablet Take 2 chew tab by mouth every morning       OMEPRAZOLE PO Take 20 mg by mouth daily       VITAMIN D, CHOLECALCIFEROL, PO Take 2,000 Units by mouth daily       OTC products: None, except as noted above    Allergies   Allergen Reactions     No Clinical Screening - See Comments Anaphylaxis     Mice      Latex Allergy: NO    Social History   Substance Use Topics     Smoking status: Never Smoker     Smokeless tobacco: Never Used     Alcohol use No     History   Drug Use No       REVIEW OF SYSTEMS:   Constitutional, neuro, ENT, endocrine, pulmonary, cardiac, gastrointestinal, genitourinary, musculoskeletal, integument and psychiatric systems are negative, except as otherwise noted.    EXAM:   /76  Pulse 70  Temp 97.5  F (36.4  C) (Oral)  Ht 5' 7\" (1.702 m)  Wt 227 lb (103 kg)  LMP 08/13/2018 (Exact Date)  SpO2 97%  Breastfeeding? No  BMI 35.55 kg/m2    GENERAL APPEARANCE: healthy, alert and no distress     EYES: EOMI, PERRL     HENT: ear canals and TM's normal and nose and mouth without ulcers or lesions     NECK: no adenopathy, no asymmetry, masses, or scars and thyroid normal to palpation     RESP: lungs clear to auscultation - no rales, rhonchi or wheezes     CV: regular rates and rhythm, normal S1 S2, no S3 or S4 and no murmur, click or rub     ABDOMEN:  soft, nontender, no HSM or masses and bowel sounds normal     MS: extremities normal- no gross deformities noted, no evidence of inflammation in joints, FROM in all extremities.     SKIN: no suspicious lesions or rashes     NEURO: Normal strength and tone, sensory exam grossly normal, mentation intact and speech normal     PSYCH: " mentation appears normal. and affect normal/bright     LYMPHATICS: No cervical adenopathy    DIAGNOSTICS:     EKG: Not indicated due to non-vascular surgery and low risk of event (age <65 and without cardiac risk factors)    Labs Resulted Today:   Results for orders placed or performed in visit on 08/09/18   CBC with platelets   Result Value Ref Range    WBC 12.5 (H) 4.0 - 11.0 10e9/L    RBC Count 3.97 3.8 - 5.2 10e12/L    Hemoglobin 13.0 11.7 - 15.7 g/dL    Hematocrit 37.3 35.0 - 47.0 %    MCV 94 78 - 100 fl    MCH 32.7 26.5 - 33.0 pg    MCHC 34.9 31.5 - 36.5 g/dL    RDW 11.7 10.0 - 15.0 %    Platelet Count 245 150 - 450 10e9/L   Vitamin B12   Result Value Ref Range    Vitamin B12 875 193 - 986 pg/mL   Vitamin D Screen   Result Value Ref Range    Vitamin D Deficiency screening 56 20 - 75 ug/L   Parathyroid Hormone Intact   Result Value Ref Range    Parathyroid Hormone Intact 53 18 - 80 pg/mL   Iron and Iron Binding Capacity   Result Value Ref Range    Iron 105 35 - 180 ug/dL    Iron Binding Cap 265 240 - 430 ug/dL    Iron Saturation Index 40 15 - 46 %   Ferritin   Result Value Ref Range    Ferritin 97 12 - 150 ng/mL       Recent Labs   Lab Test  08/09/18   1802  01/31/18   1141  08/08/17   0750   08/07/17   1314   05/16/17   0801   HGB  13.0  14.5  12.5   < >   --    < >  14.6   PLT  245  255   --    --   203   --   243   NA   --    --   140   --    --    --   138   POTASSIUM   --    --   3.5   --    --    --   4.2   CR   --    --    --    --   0.60   --   0.74   A1C   --    --    --    --    --    --   5.1    < > = values in this interval not displayed.        IMPRESSION:   Reason for surgery/procedure: Symptomatic cholelithiasis   Diagnosis/reason for consult: Preoperative evaluation     The proposed surgical procedure is considered INTERMEDIATE risk.    REVISED CARDIAC RISK INDEX  The patient has the following serious cardiovascular risks for perioperative complications such as (MI, PE, VFib and 3  AV  Block):  No serious cardiac risks  INTERPRETATION: 0 risks: Class I (very low risk - 0.4% complication rate)    The patient has the following additional risks for perioperative complications:  Morbid obesity    Encounter Diagnoses   Name Primary?     Preop general physical exam Yes     Symptomatic cholelithiasis          RECOMMENDATIONS:         --Patient is to take all scheduled medications on the day of surgery EXCEPT for modifications listed below.    APPROVAL GIVEN to proceed with proposed procedure, without further diagnostic evaluation       Signed Electronically by: Krista Landaverde MD MPH      Copy of this evaluation report is provided to requesting physician.    [facility] Preop Guidelines    Revised Cardiac Risk Index

## 2018-08-29 NOTE — MR AVS SNAPSHOT
After Visit Summary   8/29/2018    Clarissa Paulino    MRN: [id number]           Patient Information     Date Of Birth          1985        Visit Information        Provider Department      8/29/2018 11:20 AM Krista Landaverde MD [facility]        Today's Diagnoses     Preop general physical exam    -  1    Symptomatic cholelithiasis          Care Instructions    At [facility], we strive to deliver an exceptional experience to you, every time we see you.  If you receive a survey in the mail, please send us back your thoughts. We really do value your feedback.    Based on your medical history, these are the current health maintenance/preventive care services that you are due for (some may have been done at this visit.)  Health Maintenance Due   Topic Date Due     HIV SCREEN (SYSTEM ASSIGNED)  11/21/2002       Suggested websites for health information:  Www.[employer] : Up to date and easily searchable information on multiple topics.  Www.[employer].gov : medication info, interactive tutorials, watch real surgeries online  Www.familydoctor.org : good info from the Academy of Family Physicians  Www.cdc.gov : public health info, travel advisories, epidemics (H1N1)  Www.aap.org : children's health info, normal development, vaccinations  Www.health.[facility].[place].us : [place] dept of health, public health issues in [place], N1N1    Your care team:                            Family Medicine Internal Medicine   MD Ankur Hogue MD Shantel Branch-Fleming, MD Katya Georgiev PA-C Megan Hill, CARI Dykes MD Pediatrics   DEANDRE Gomez, MD Gabriela Coles APRN CNP   MD Lynette Howard MD Deborah Mielke, MD Kim Thein, APRN [facility]      Clinic hours: Monday - Thursday 7 am-7 pm; Fridays 7 am-5 pm.   Urgent care: Monday - Friday 11 am-9 pm; Saturday and Sunday 9 am-5 pm.  Pharmacy : Monday -Thursday 8  am-8 pm; Friday 8 am-6 pm; Saturday and Sunday 9 am-5 pm.     Clinic: [phone]   Pharmacy: [phone]        Before Your Surgery      Call your surgeon if there is any change in your health. This includes signs of a cold or flu (such as a sore throat, runny nose, cough, rash or fever).    Do not smoke, drink alcohol or take over the counter medicine (unless your surgeon or primary care doctor tells you to) for the 24 hours before and after surgery.    If you take prescribed drugs: Follow your doctor s orders about which medicines to take and which to stop until after surgery.    Eating and drinking prior to surgery: follow the instructions from your surgeon    Take a shower or bath the night before surgery. Use the soap your surgeon gave you to gently clean your skin. If you do not have soap from your surgeon, use your regular soap. Do not shave or scrub the surgery site.  Wear clean pajamas and have clean sheets on your bed.           Follow-ups after your visit        Follow-up notes from your care team     Return in about 1 year (around 8/29/2019) for Routine Visit.      Your next 10 appointments already scheduled     Sep 04, 2018   Procedure with Kenneth Jo MD   [facility] (--)    [address]  [facility] [address]   [phone]            Sep 04, 2018  2:30 PM CDT   [facility] Same Day Surgery with Kenneth Jo MD   [facility] ([facility])    [facility] ([facility])   [phone]              Who to contact     If you have questions or need follow up information about today's clinic visit or your schedule please contact [facility]IN[facility] directly at [phone].  Normal or non-critical lab and imaging results will be communicated to you by MyChart, letter or phone within 4 business days after the clinic has received the results. If you  "do not hear from us within 7 days, please contact the clinic through [employer] or phone. If you have a critical or abnormal lab result, we will notify you by phone as soon as possible.  Submit refill requests through [employer] or call your pharmacy and they will forward the refill request to us. Please allow 3 business days for your refill to be completed.          Additional Information About Your Visit        [employer] Information     [employer] gives you secure access to your electronic health record. If you see a primary care provider, you can also send messages to your care team and make appointments. If you have questions, please call your primary care clinic.  If you do not have a primary care provider, please call [phone] and they will assist you.        Care EveryWhere ID     This is your Care EveryWhere ID. This could be used by other organizations to access your [place] medical records  [id number]        Your Vitals Were     Pulse Temperature Height Last Period Pulse Oximetry Breastfeeding?    70 97.5  F (36.4  C) (Oral) 5' 7\" (1.702 m) 08/13/2018 (Exact Date) 97% No    BMI (Body Mass Index)                   35.55 kg/m2            Blood Pressure from Last 3 Encounters:   08/29/18 116/76   08/21/18 110/70   08/08/18 129/79    Weight from Last 3 Encounters:   08/29/18 227 lb (103 kg)   08/21/18 227 lb (103 kg)   08/08/18 227 lb 15.3 oz (103.4 kg)              Today, you had the following     No orders found for display         Today's Medication Changes          These changes are accurate as of 8/29/18 11:55 AM.  If you have any questions, ask your nurse or doctor.               Stop taking these medicines if you haven't already. Please contact your care team if you have questions.     HYDROcodone-acetaminophen 5-325 MG per tablet   Commonly known as:  NORCO   Stopped by:  Krista Landaverde MD           ZOFRAN PO   Stopped by:  Krista Landaverde MD                    Primary Care Provider Office Phone # Fax # "    Krista Landaverde [phone]       [address]  [facility] [address]        Equal Access to Services     QIAN SANCHEZ : Hadii aad ku hadguanacotasneem Broderick, wamanoj castillojustinha, shahanata kajeanada alejandra, soren suriin hayaayadi carpentermarita olguin lola shelley. So [facility] [phone].    ATENCIÓN: Si habla español, tiene a chirinos disposición servicios gratuitos de asistencia lingüística. Llame al [phone].    We comply with applicable federal civil rights laws and [place] laws. We do not discriminate on the basis of race, color, national origin, age, disability, sex, sexual orientation, or gender identity.            Thank you!     Thank you for choosing [facility]  for your care. Our goal is always to provide you with excellent care. Hearing back from our patients is one way we can continue to improve our services. Please take a few minutes to complete the written survey that you may receive in the mail after your visit with us. Thank you!             Your Updated Medication List - Protect others around you: Learn how to safely use, store and throw away your medicines at www.disposemymeds.org.          This list is accurate as of 8/29/18 11:55 AM.  Always use your most recent med list.                   Brand Name Dispense Instructions for use Diagnosis    B-12-SL SL      Place 1,000 mcg under the tongue daily        calcium citrate-vitamin D 315-250 MG-UNIT Tabs per tablet    CITRACAL     Take 2 tablets by mouth 2 times daily        escitalopram 10 MG tablet    LEXAPRO    90 tablet    Take 1 tablet (10 mg) by mouth daily    Generalized anxiety disorder       multivitamin  peds with iron 60 MG chewable tablet      Take 2 chew tab by mouth every morning        OMEPRAZOLE PO      Take 20 mg by mouth daily        VITAMIN D (CHOLECALCIFEROL) PO      Take 2,000 Units by mouth daily        VITRON-C PO      Take 1 tablet by mouth every morning

## 2018-08-29 NOTE — PROGRESS NOTES
[address] [address]  [phone]  Dept: [phone]    PRE-OP EVALUATION:  Today's date: 2018    Clarissa Paulino (: 1985) presents for pre-operative evaluation assessment as requested by Dr. Jo.  She requires evaluation and anesthesia risk assessment prior to undergoing surgery/procedure for treatment of Gallstones .    Proposed Surgery/ Procedure: LAPAROSCOPIC CHOLECYSTECTOMY   Date of Surgery/ Procedure: 18  Time of Surgery/ Procedure: 2:30 pm  Hospital/Surgical Facility: [facility]  Fax number for surgical facility: [phone]  Primary Physician: Krista Landaverde  Type of Anesthesia Anticipated: General    Patient has a Health Care Directive or Living Will:  NO    1. NO - Do you have a history of heart attack, stroke, stent, bypass or surgery on an artery in the head, neck, heart or legs?  2. NO - Do you ever have any pain or discomfort in your chest?  3. NO - Do you have a history of  Heart Failure?  4. NO - Are you troubled by shortness of breath when: walking on the level, up a slight hill or at night?  5. NO - Do you currently have a cold, bronchitis or other respiratory infection?  6. NO - Do you have a cough, shortness of breath or wheezing?  7. NO - Do you sometimes get pains in the calves of your legs when you walk?  8. YES - Do you or anyone in your family have previous history of blood clots?Mother with DVT and PE in her 50s, hypercoagulation work up was negative, believed to be due to exogenous hormones   9. NO - Do you or does anyone in your family have a serious bleeding problem such as prolonged bleeding following surgeries or cuts?  10. NO - Have you ever had problems with anemia or been told to take iron pills?  11. NO - Have you had any abnormal blood loss such as black, tarry or bloody stools, or abnormal vaginal bleeding?  12. NO - Have you ever had a blood transfusion?  13. NO - Have you or any  of your relatives ever had problems with anesthesia?  14. NO - Do you have sleep apnea, excessive snoring or daytime drowsiness?  15. NO - Do you have any prosthetic heart valves?  16. NO - Do you have prosthetic joints?  17. NO - Is there any chance that you may be pregnant?      HPI:     HPI related to upcoming procedure: 32 year old female with right upper quadrant pain and nausea usually after eating. Was noted to have symptomatic gallstones at time of laparoscopic gastric sleeve last year.       See problem list for active medical problems.  Problems all longstanding and stable, except as noted/documented.  See ROS for pertinent symptoms related to these conditions.                                                                                                                                                          .    MEDICAL HISTORY:     Patient Active Problem List    Diagnosis Date Noted     Symptomatic cholelithiasis 08/22/2018     Priority: Medium     Obesity (BMI 30-39.9) 02/14/2018     Priority: Medium     GERD without esophagitis 02/14/2018     Priority: Medium     Postsurgical malabsorption 02/14/2018     Priority: Medium     Bariatric surgery status 08/15/2017     Priority: Medium     History of infertility 05/10/2017     Priority: Medium     MONTEZ (generalized anxiety disorder) 03/28/2016     Priority: Medium     CARDIOVASCULAR SCREENING; LDL GOAL LESS THAN 160 10/31/2010     Priority: Medium      Past Medical History:   Diagnosis Date     Gastroesophageal reflux disease      Past Surgical History:   Procedure Laterality Date     LAPAROSCOPIC GASTRIC SLEEVE N/A 8/7/2017    Procedure: LAPAROSCOPIC GASTRIC SLEEVE;  LAPAROSCOPIC GASTRIC SLEEVE;  Surgeon: Sloan Burnette MD;  Location:  OR     ORTHOPEDIC SURGERY  1991    Left elbow     Current Outpatient Prescriptions   Medication Sig Dispense Refill     calcium citrate-vitamin D (CITRACAL) 315-250 MG-UNIT TABS per tablet Take 2 tablets by mouth 2  "times daily        Cyanocobalamin (B-12-SL SL) Place 1,000 mcg under the tongue daily        escitalopram (LEXAPRO) 10 MG tablet Take 1 tablet (10 mg) by mouth daily 90 tablet 1     Iron-Vitamin C (VITRON-C PO) Take 1 tablet by mouth every morning       multivitamin  peds with iron (FLINTSTONES COMPLETE) 60 MG chewable tablet Take 2 chew tab by mouth every morning       OMEPRAZOLE PO Take 20 mg by mouth daily       VITAMIN D, CHOLECALCIFEROL, PO Take 2,000 Units by mouth daily       OTC products: None, except as noted above    Allergies   Allergen Reactions     No Clinical Screening - See Comments Anaphylaxis     Mice      Latex Allergy: NO    Social History   Substance Use Topics     Smoking status: Never Smoker     Smokeless tobacco: Never Used     Alcohol use No     History   Drug Use No       REVIEW OF SYSTEMS:   Constitutional, neuro, ENT, endocrine, pulmonary, cardiac, gastrointestinal, genitourinary, musculoskeletal, integument and psychiatric systems are negative, except as otherwise noted.    EXAM:   /76  Pulse 70  Temp 97.5  F (36.4  C) (Oral)  Ht 5' 7\" (1.702 m)  Wt 227 lb (103 kg)  LMP 08/13/2018 (Exact Date)  SpO2 97%  Breastfeeding? No  BMI 35.55 kg/m2    GENERAL APPEARANCE: healthy, alert and no distress     EYES: EOMI, PERRL     HENT: ear canals and TM's normal and nose and mouth without ulcers or lesions     NECK: no adenopathy, no asymmetry, masses, or scars and thyroid normal to palpation     RESP: lungs clear to auscultation - no rales, rhonchi or wheezes     CV: regular rates and rhythm, normal S1 S2, no S3 or S4 and no murmur, click or rub     ABDOMEN:  soft, nontender, no HSM or masses and bowel sounds normal     MS: extremities normal- no gross deformities noted, no evidence of inflammation in joints, FROM in all extremities.     SKIN: no suspicious lesions or rashes     NEURO: Normal strength and tone, sensory exam grossly normal, mentation intact and speech normal     PSYCH: " mentation appears normal. and affect normal/bright     LYMPHATICS: No cervical adenopathy    DIAGNOSTICS:     EKG: Not indicated due to non-vascular surgery and low risk of event (age <65 and without cardiac risk factors)    Labs Resulted Today:   Results for orders placed or performed in visit on 08/09/18   CBC with platelets   Result Value Ref Range    WBC 12.5 (H) 4.0 - 11.0 10e9/L    RBC Count 3.97 3.8 - 5.2 10e12/L    Hemoglobin 13.0 11.7 - 15.7 g/dL    Hematocrit 37.3 35.0 - 47.0 %    MCV 94 78 - 100 fl    MCH 32.7 26.5 - 33.0 pg    MCHC 34.9 31.5 - 36.5 g/dL    RDW 11.7 10.0 - 15.0 %    Platelet Count 245 150 - 450 10e9/L   Vitamin B12   Result Value Ref Range    Vitamin B12 875 193 - 986 pg/mL   Vitamin D Screen   Result Value Ref Range    Vitamin D Deficiency screening 56 20 - 75 ug/L   Parathyroid Hormone Intact   Result Value Ref Range    Parathyroid Hormone Intact 53 18 - 80 pg/mL   Iron and Iron Binding Capacity   Result Value Ref Range    Iron 105 35 - 180 ug/dL    Iron Binding Cap 265 240 - 430 ug/dL    Iron Saturation Index 40 15 - 46 %   Ferritin   Result Value Ref Range    Ferritin 97 12 - 150 ng/mL       Recent Labs   Lab Test  08/09/18   1802  01/31/18   1141  08/08/17   0750   08/07/17   1314   05/16/17   0801   HGB  13.0  14.5  12.5   < >   --    < >  14.6   PLT  245  255   --    --   203   --   243   NA   --    --   140   --    --    --   138   POTASSIUM   --    --   3.5   --    --    --   4.2   CR   --    --    --    --   0.60   --   0.74   A1C   --    --    --    --    --    --   5.1    < > = values in this interval not displayed.        IMPRESSION:   Reason for surgery/procedure: Symptomatic cholelithiasis   Diagnosis/reason for consult: Preoperative evaluation     The proposed surgical procedure is considered INTERMEDIATE risk.    REVISED CARDIAC RISK INDEX  The patient has the following serious cardiovascular risks for perioperative complications such as (MI, PE, VFib and 3  AV  Block):  No serious cardiac risks  INTERPRETATION: 0 risks: Class I (very low risk - 0.4% complication rate)    The patient has the following additional risks for perioperative complications:  Morbid obesity    Encounter Diagnoses   Name Primary?     Preop general physical exam Yes     Symptomatic cholelithiasis          RECOMMENDATIONS:         --Patient is to take all scheduled medications on the day of surgery EXCEPT for modifications listed below.    APPROVAL GIVEN to proceed with proposed procedure, without further diagnostic evaluation       Signed Electronically by: Krista Landaverde MD MPH      Copy of this evaluation report is provided to requesting physician.    [facility] Preop Guidelines    Revised Cardiac Risk Index

## 2018-08-29 NOTE — PROGRESS NOTES
Faxed Pre-op notes, no labs and no Ekg to [facility], [phone], right fax confirmed at 12:20 pm today.  Oneida Fuller MA/  For Teams Maggie

## 2018-09-03 ENCOUNTER — ANESTHESIA EVENT (OUTPATIENT)
Dept: SURGERY | Facility: CLINIC | Age: 33
End: 2018-09-03
Payer: COMMERCIAL

## 2018-09-04 ENCOUNTER — APPOINTMENT (OUTPATIENT)
Dept: SURGERY | Facility: PHYSICIAN GROUP | Age: 33
End: 2018-09-04
Payer: COMMERCIAL

## 2018-09-04 ENCOUNTER — SURGERY (OUTPATIENT)
Age: 33
End: 2018-09-04

## 2018-09-04 ENCOUNTER — ANESTHESIA (OUTPATIENT)
Dept: SURGERY | Facility: CLINIC | Age: 33
End: 2018-09-04
Payer: COMMERCIAL

## 2018-09-04 ENCOUNTER — HOSPITAL ENCOUNTER (OUTPATIENT)
Facility: CLINIC | Age: 33
Discharge: HOME | End: 2018-09-04
Attending: SURGERY | Admitting: SURGERY
Payer: COMMERCIAL

## 2018-09-04 VITALS
SYSTOLIC BLOOD PRESSURE: 122 MMHG | TEMPERATURE: 97 F | RESPIRATION RATE: 16 BRPM | OXYGEN SATURATION: 95 % | HEIGHT: 67 IN | BODY MASS INDEX: 36.03 KG/M2 | DIASTOLIC BLOOD PRESSURE: 67 MMHG | WEIGHT: 229.6 LBS

## 2018-09-04 DIAGNOSIS — K80.20 SYMPTOMATIC CHOLELITHIASIS: Primary | ICD-10-CM

## 2018-09-04 LAB — HCG UR QL: NEGATIVE

## 2018-09-04 PROCEDURE — 47562 LAPAROSCOPIC CHOLECYSTECTOMY: CPT | Performed by: SURGERY

## 2018-09-04 PROCEDURE — 25000125 ZZHC RX 250: Performed by: NURSE ANESTHETIST, CERTIFIED REGISTERED

## 2018-09-04 PROCEDURE — 40000170 ZZH STATISTIC PRE-PROCEDURE ASSESSMENT II: Performed by: SURGERY

## 2018-09-04 PROCEDURE — 36000058 ZZH SURGERY LEVEL 3 EA 15 ADDTL MIN: Performed by: SURGERY

## 2018-09-04 PROCEDURE — 71000013 ZZH RECOVERY PHASE 1 LEVEL 1 EA ADDTL HR: Performed by: SURGERY

## 2018-09-04 PROCEDURE — 25000128 H RX IP 250 OP 636: Performed by: NURSE ANESTHETIST, CERTIFIED REGISTERED

## 2018-09-04 PROCEDURE — 71000012 ZZH RECOVERY PHASE 1 LEVEL 1 FIRST HR: Performed by: SURGERY

## 2018-09-04 PROCEDURE — 37000008 ZZH ANESTHESIA TECHNICAL FEE, 1ST 30 MIN: Performed by: SURGERY

## 2018-09-04 PROCEDURE — 88304 TISSUE EXAM BY PATHOLOGIST: CPT | Performed by: SURGERY

## 2018-09-04 PROCEDURE — 71000027 ZZH RECOVERY PHASE 2 EACH 15 MINS: Performed by: SURGERY

## 2018-09-04 PROCEDURE — 27210794 ZZH OR GENERAL SUPPLY STERILE: Performed by: SURGERY

## 2018-09-04 PROCEDURE — 25000132 ZZH RX MED GY IP 250 OP 250 PS 637: Performed by: SURGERY

## 2018-09-04 PROCEDURE — 25000125 ZZHC RX 250: Performed by: SURGERY

## 2018-09-04 PROCEDURE — 88304 TISSUE EXAM BY PATHOLOGIST: CPT | Mod: 26 | Performed by: SURGERY

## 2018-09-04 PROCEDURE — 81025 URINE PREGNANCY TEST: CPT | Performed by: ANESTHESIOLOGY

## 2018-09-04 PROCEDURE — 37000009 ZZH ANESTHESIA TECHNICAL FEE, EACH ADDTL 15 MIN: Performed by: SURGERY

## 2018-09-04 PROCEDURE — 25000128 H RX IP 250 OP 636: Performed by: SURGERY

## 2018-09-04 PROCEDURE — 36000056 ZZH SURGERY LEVEL 3 1ST 30 MIN: Performed by: SURGERY

## 2018-09-04 PROCEDURE — 25000128 H RX IP 250 OP 636: Performed by: ANESTHESIOLOGY

## 2018-09-04 PROCEDURE — 25000566 ZZH SEVOFLURANE, EA 15 MIN: Performed by: SURGERY

## 2018-09-04 RX ORDER — FENTANYL CITRATE 50 UG/ML
25-50 INJECTION, SOLUTION INTRAMUSCULAR; INTRAVENOUS
Status: DISCONTINUED | OUTPATIENT
Start: 2018-09-04 | End: 2018-09-05 | Stop reason: HOSPADM

## 2018-09-04 RX ORDER — CEFAZOLIN SODIUM 2 G/100ML
2 INJECTION, SOLUTION INTRAVENOUS
Status: COMPLETED | OUTPATIENT
Start: 2018-09-04 | End: 2018-09-04

## 2018-09-04 RX ORDER — GLYCOPYRROLATE 0.2 MG/ML
INJECTION, SOLUTION INTRAMUSCULAR; INTRAVENOUS PRN
Status: DISCONTINUED | OUTPATIENT
Start: 2018-09-04 | End: 2018-09-04

## 2018-09-04 RX ORDER — NEOSTIGMINE METHYLSULFATE 1 MG/ML
VIAL (ML) INJECTION PRN
Status: DISCONTINUED | OUTPATIENT
Start: 2018-09-04 | End: 2018-09-04

## 2018-09-04 RX ORDER — BUPIVACAINE HYDROCHLORIDE AND EPINEPHRINE 2.5; 5 MG/ML; UG/ML
INJECTION, SOLUTION INFILTRATION; PERINEURAL PRN
Status: DISCONTINUED | OUTPATIENT
Start: 2018-09-04 | End: 2018-09-04 | Stop reason: HOSPADM

## 2018-09-04 RX ORDER — SODIUM CHLORIDE, SODIUM LACTATE, POTASSIUM CHLORIDE, CALCIUM CHLORIDE 600; 310; 30; 20 MG/100ML; MG/100ML; MG/100ML; MG/100ML
INJECTION, SOLUTION INTRAVENOUS CONTINUOUS PRN
Status: DISCONTINUED | OUTPATIENT
Start: 2018-09-04 | End: 2018-09-04

## 2018-09-04 RX ORDER — NALOXONE HYDROCHLORIDE 0.4 MG/ML
.1-.4 INJECTION, SOLUTION INTRAMUSCULAR; INTRAVENOUS; SUBCUTANEOUS
Status: DISCONTINUED | OUTPATIENT
Start: 2018-09-04 | End: 2018-09-05 | Stop reason: HOSPADM

## 2018-09-04 RX ORDER — ONDANSETRON 2 MG/ML
4 INJECTION INTRAMUSCULAR; INTRAVENOUS EVERY 30 MIN PRN
Status: COMPLETED | OUTPATIENT
Start: 2018-09-04 | End: 2018-09-04

## 2018-09-04 RX ORDER — HYDROMORPHONE HYDROCHLORIDE 1 MG/ML
.3-.5 INJECTION, SOLUTION INTRAMUSCULAR; INTRAVENOUS; SUBCUTANEOUS EVERY 10 MIN PRN
Status: DISCONTINUED | OUTPATIENT
Start: 2018-09-04 | End: 2018-09-05 | Stop reason: HOSPADM

## 2018-09-04 RX ORDER — ONDANSETRON 2 MG/ML
INJECTION INTRAMUSCULAR; INTRAVENOUS PRN
Status: DISCONTINUED | OUTPATIENT
Start: 2018-09-04 | End: 2018-09-04

## 2018-09-04 RX ORDER — VECURONIUM BROMIDE 1 MG/ML
INJECTION, POWDER, LYOPHILIZED, FOR SOLUTION INTRAVENOUS PRN
Status: DISCONTINUED | OUTPATIENT
Start: 2018-09-04 | End: 2018-09-04

## 2018-09-04 RX ORDER — HYDROCODONE BITARTRATE AND ACETAMINOPHEN 5; 325 MG/1; MG/1
1-2 TABLET ORAL EVERY 4 HOURS PRN
Qty: 20 TABLET | Refills: 0 | Status: SHIPPED | OUTPATIENT
Start: 2018-09-04 | End: 2019-05-22

## 2018-09-04 RX ORDER — DEXAMETHASONE SODIUM PHOSPHATE 4 MG/ML
INJECTION, SOLUTION INTRA-ARTICULAR; INTRALESIONAL; INTRAMUSCULAR; INTRAVENOUS; SOFT TISSUE PRN
Status: DISCONTINUED | OUTPATIENT
Start: 2018-09-04 | End: 2018-09-04

## 2018-09-04 RX ORDER — PROPOFOL 10 MG/ML
INJECTION, EMULSION INTRAVENOUS PRN
Status: DISCONTINUED | OUTPATIENT
Start: 2018-09-04 | End: 2018-09-04

## 2018-09-04 RX ORDER — CEFAZOLIN SODIUM 1 G/3ML
1 INJECTION, POWDER, FOR SOLUTION INTRAMUSCULAR; INTRAVENOUS SEE ADMIN INSTRUCTIONS
Status: DISCONTINUED | OUTPATIENT
Start: 2018-09-04 | End: 2018-09-04 | Stop reason: HOSPADM

## 2018-09-04 RX ORDER — SODIUM CHLORIDE, SODIUM LACTATE, POTASSIUM CHLORIDE, CALCIUM CHLORIDE 600; 310; 30; 20 MG/100ML; MG/100ML; MG/100ML; MG/100ML
INJECTION, SOLUTION INTRAVENOUS CONTINUOUS
Status: DISCONTINUED | OUTPATIENT
Start: 2018-09-04 | End: 2018-09-05 | Stop reason: HOSPADM

## 2018-09-04 RX ORDER — ACETAMINOPHEN 325 MG/1
650 TABLET ORAL
Status: DISCONTINUED | OUTPATIENT
Start: 2018-09-04 | End: 2018-09-05 | Stop reason: HOSPADM

## 2018-09-04 RX ORDER — LIDOCAINE HYDROCHLORIDE 20 MG/ML
INJECTION, SOLUTION INFILTRATION; PERINEURAL PRN
Status: DISCONTINUED | OUTPATIENT
Start: 2018-09-04 | End: 2018-09-04

## 2018-09-04 RX ORDER — BUPIVACAINE HYDROCHLORIDE AND EPINEPHRINE 2.5; 5 MG/ML; UG/ML
INJECTION, SOLUTION EPIDURAL; INFILTRATION; INTRACAUDAL; PERINEURAL
Status: DISCONTINUED
Start: 2018-09-04 | End: 2018-09-04 | Stop reason: HOSPADM

## 2018-09-04 RX ORDER — ONDANSETRON 4 MG/1
4 TABLET, ORALLY DISINTEGRATING ORAL EVERY 30 MIN PRN
Status: COMPLETED | OUTPATIENT
Start: 2018-09-04 | End: 2018-09-04

## 2018-09-04 RX ORDER — OXYCODONE HYDROCHLORIDE 5 MG/1
5 TABLET ORAL
Status: COMPLETED | OUTPATIENT
Start: 2018-09-04 | End: 2018-09-04

## 2018-09-04 RX ORDER — ACETAMINOPHEN 650 MG/1
650 SUPPOSITORY RECTAL EVERY 4 HOURS PRN
Status: DISCONTINUED | OUTPATIENT
Start: 2018-09-04 | End: 2018-09-05 | Stop reason: HOSPADM

## 2018-09-04 RX ORDER — ALBUTEROL SULFATE 0.83 MG/ML
2.5 SOLUTION RESPIRATORY (INHALATION) EVERY 4 HOURS PRN
Status: DISCONTINUED | OUTPATIENT
Start: 2018-09-04 | End: 2018-09-05 | Stop reason: HOSPADM

## 2018-09-04 RX ORDER — FENTANYL CITRATE 50 UG/ML
INJECTION, SOLUTION INTRAMUSCULAR; INTRAVENOUS PRN
Status: DISCONTINUED | OUTPATIENT
Start: 2018-09-04 | End: 2018-09-04

## 2018-09-04 RX ORDER — PROPOFOL 10 MG/ML
INJECTION, EMULSION INTRAVENOUS CONTINUOUS PRN
Status: DISCONTINUED | OUTPATIENT
Start: 2018-09-04 | End: 2018-09-04

## 2018-09-04 RX ORDER — MEPERIDINE HYDROCHLORIDE 25 MG/ML
12.5 INJECTION INTRAMUSCULAR; INTRAVENOUS; SUBCUTANEOUS
Status: DISCONTINUED | OUTPATIENT
Start: 2018-09-04 | End: 2018-09-05 | Stop reason: HOSPADM

## 2018-09-04 RX ADMIN — ROCURONIUM BROMIDE 40 MG: 10 INJECTION INTRAVENOUS at 14:28

## 2018-09-04 RX ADMIN — FENTANYL CITRATE 50 MCG: 50 INJECTION, SOLUTION INTRAMUSCULAR; INTRAVENOUS at 14:42

## 2018-09-04 RX ADMIN — FENTANYL CITRATE 50 MCG: 50 INJECTION, SOLUTION INTRAMUSCULAR; INTRAVENOUS at 14:57

## 2018-09-04 RX ADMIN — MIDAZOLAM 2 MG: 1 INJECTION INTRAMUSCULAR; INTRAVENOUS at 14:28

## 2018-09-04 RX ADMIN — NEOSTIGMINE METHYLSULFATE 2.5 MG: 1 INJECTION, SOLUTION INTRAVENOUS at 15:37

## 2018-09-04 RX ADMIN — GLYCOPYRROLATE 0.4 MG: 0.2 INJECTION, SOLUTION INTRAMUSCULAR; INTRAVENOUS at 15:37

## 2018-09-04 RX ADMIN — VECURONIUM BROMIDE 0.5 MG: 1 INJECTION, POWDER, LYOPHILIZED, FOR SOLUTION INTRAVENOUS at 15:22

## 2018-09-04 RX ADMIN — LIDOCAINE HYDROCHLORIDE 40 MG: 20 INJECTION, SOLUTION INFILTRATION; PERINEURAL at 14:28

## 2018-09-04 RX ADMIN — ROCURONIUM BROMIDE 5 MG: 10 INJECTION INTRAVENOUS at 14:54

## 2018-09-04 RX ADMIN — NEOSTIGMINE METHYLSULFATE 2.5 MG: 1 INJECTION, SOLUTION INTRAVENOUS at 15:40

## 2018-09-04 RX ADMIN — SODIUM CHLORIDE, POTASSIUM CHLORIDE, SODIUM LACTATE AND CALCIUM CHLORIDE: 600; 310; 30; 20 INJECTION, SOLUTION INTRAVENOUS at 15:27

## 2018-09-04 RX ADMIN — BUPIVACAINE HYDROCHLORIDE AND EPINEPHRINE BITARTRATE 30 ML: 2.5; .005 INJECTION, SOLUTION EPIDURAL; INFILTRATION; INTRACAUDAL; PERINEURAL at 15:38

## 2018-09-04 RX ADMIN — PROPOFOL 200 MG: 10 INJECTION, EMULSION INTRAVENOUS at 14:28

## 2018-09-04 RX ADMIN — PROPOFOL 200 MCG/KG/MIN: 10 INJECTION, EMULSION INTRAVENOUS at 14:28

## 2018-09-04 RX ADMIN — FENTANYL CITRATE 50 MCG: 50 INJECTION, SOLUTION INTRAMUSCULAR; INTRAVENOUS at 17:12

## 2018-09-04 RX ADMIN — FENTANYL CITRATE 50 MCG: 50 INJECTION, SOLUTION INTRAMUSCULAR; INTRAVENOUS at 16:54

## 2018-09-04 RX ADMIN — OXYCODONE HYDROCHLORIDE 5 MG: 5 TABLET ORAL at 17:36

## 2018-09-04 RX ADMIN — FENTANYL CITRATE 50 MCG: 50 INJECTION, SOLUTION INTRAMUSCULAR; INTRAVENOUS at 14:28

## 2018-09-04 RX ADMIN — ONDANSETRON 4 MG: 2 INJECTION INTRAMUSCULAR; INTRAVENOUS at 15:29

## 2018-09-04 RX ADMIN — ONDANSETRON 4 MG: 2 INJECTION INTRAMUSCULAR; INTRAVENOUS at 16:21

## 2018-09-04 RX ADMIN — SODIUM CHLORIDE, POTASSIUM CHLORIDE, SODIUM LACTATE AND CALCIUM CHLORIDE: 600; 310; 30; 20 INJECTION, SOLUTION INTRAVENOUS at 16:58

## 2018-09-04 RX ADMIN — FENTANYL CITRATE 50 MCG: 50 INJECTION, SOLUTION INTRAMUSCULAR; INTRAVENOUS at 16:29

## 2018-09-04 RX ADMIN — Medication 0.5 MG: at 17:41

## 2018-09-04 RX ADMIN — CEFAZOLIN SODIUM 2 G: 2 INJECTION, SOLUTION INTRAVENOUS at 14:33

## 2018-09-04 RX ADMIN — ROCURONIUM BROMIDE 5 MG: 10 INJECTION INTRAVENOUS at 15:12

## 2018-09-04 RX ADMIN — ONDANSETRON 4 MG: 2 INJECTION INTRAMUSCULAR; INTRAVENOUS at 18:10

## 2018-09-04 RX ADMIN — FENTANYL CITRATE 50 MCG: 50 INJECTION, SOLUTION INTRAMUSCULAR; INTRAVENOUS at 16:21

## 2018-09-04 RX ADMIN — GLYCOPYRROLATE 0.5 MG: 0.2 INJECTION, SOLUTION INTRAMUSCULAR; INTRAVENOUS at 15:40

## 2018-09-04 RX ADMIN — DEXAMETHASONE SODIUM PHOSPHATE 4 MG: 4 INJECTION, SOLUTION INTRA-ARTICULAR; INTRALESIONAL; INTRAMUSCULAR; INTRAVENOUS; SOFT TISSUE at 14:36

## 2018-09-04 RX ADMIN — Medication 0.5 MG: at 18:10

## 2018-09-04 RX ADMIN — SODIUM CHLORIDE, POTASSIUM CHLORIDE, SODIUM LACTATE AND CALCIUM CHLORIDE: 600; 310; 30; 20 INJECTION, SOLUTION INTRAVENOUS at 14:20

## 2018-09-04 ASSESSMENT — ENCOUNTER SYMPTOMS: ORTHOPNEA: 0

## 2018-09-04 NOTE — IP AVS SNAPSHOT
MRN:[id number]                      After Visit Summary   9/4/2018    Clarissa Paulino    MRN: [id number]           Thank you!     Thank you for choosing [facility] for your care. Our goal is always to provide you with excellent care. Hearing back from our patients is one way we can continue to improve our services. Please take a few minutes to complete the written survey that you may receive in the mail after you visit with us. Thank you!        Patient Information     Date Of Birth          1985        About your hospital stay     You were admitted on:  September 4, 2018 You last received care in the:  [facility]    You were discharged on:  September 4, 2018       Who to Call     For medical emergencies, please call 911.  For non-urgent questions about your medical care, please call your primary care provider or clinic, [phone]  For questions related to your surgery, please call your surgery clinic        Attending Provider     Provider Kenneth Powell MD Surgery       Primary Care Provider Office Phone # Fax #    Krista Landaverde [phone]      After Care Instructions     Diet Instructions       Resume pre-procedure diet            Discharge Instructions       Follow up appointment as instructed by Surgeon and or RN. Will will call you to make sure you are doing well after your surgery in 2-3 weeks.            Dressing       Keep dressing clean and dry.  Dressing / incisional care as instructed by RN and or Surgeon: 1) You can remove dressings (bandaids) in 48 hours and shower  2) Do not soak or scrub your incisions  3) Leave the steri-strips alone and let them fall off on their own  4) Keep your incisions clean and dry            No Alcohol       For 24 hours post procedure            No driving or operating machinery        until the day after procedure            No lifting        No lifting over 15 lbs and no  strenuous physical activity for 4 weeks            Shower       No shower for 48 hours post procedure. May shower Postoperative Day (POD)  2                  Further instructions from your care team           Same Day Surgery Discharge Instructions for  Sedation and General Anesthesia       It's not unusual to feel dizzy, light-headed or faint for up to 24 hours after surgery or while taking pain medication.  If you have these symptoms: sit for a few minutes before standing and have someone assist you when you get up to walk or use the bathroom.      You should rest and relax for the next 24 hours. We recommend you make arrangements to have an adult stay with you for at least 24 hours after your discharge.  Avoid hazardous and strenuous activity.      DO NOT DRIVE any vehicle or operate mechanical equipment for 24 hours following the end of your surgery.  Even though you may feel normal, your reactions may be affected by the medication you have received.      Do not drink alcoholic beverages for 24 hours following surgery.       Slowly progress to your regular diet as you feel able. It's not unusual to feel nauseated and/or vomit after receiving anesthesia.  If you develop these symptoms, drink clear liquids (apple juice, ginger ale, broth, 7-up, etc. ) until you feel better.  If your nausea and vomiting persists for 24 hours, please notify your surgeon.        All narcotic pain medications, along with inactivity and anesthesia, can cause constipation. Drinking plenty of liquids and increasing fiber intake will help.      For any questions of a medical nature, call your surgeon.      Do not make important decisions for 24 hours.      If you had general anesthesia, you may have a sore throat for a couple of days related to the breathing tube used during surgery.  You may use Cepacol lozenges to help with this discomfort.  If it worsens or if you develop a fever, contact your surgeon.       If you feel your pain is  not well managed with the pain medications prescribed by your surgeon, please contact your surgeon's office to let them know so they can address your concerns.       [facility]  Discharge Instructions: Post-Operative Laparoscopic Cholecystectomy    ACTIVITY    Expect to feel tired after your surgery.  This will gradually resolve.    Take frequent, short walks and increase your activity gradually.      Avoid strenuous physical activity or heavy lifting greater than 15-20 lbs. for 2-3 weeks.  You may climb stairs.    You may drive without restrictions when you are not using any prescription pain medication and comfortable in a car.    You may return to work/school when you are comfortable without any prescription pain medication.    WOUND CARE    You may remove your outer dressing or Band-Aids and shower 48 hours after the surgery.  Pat your incisions dry and leave open to air.  Re-apply dressing (Band-aid or gauze/tape) as needed for comfort or drainage.    You may have steri-strips (looks like white tape) on your incision.  You may peel off the steri-strip 2 weeks after surgery if they have not peeled off on their own.    Do not soak your incisions in a tub or pool for 2 weeks.     Do not apply any lotions, creams, or ointments to your incisions.    A ridge under incisions is normal and will gradually resolve.    DIET    Start with liquids, then gradually resume your regular diet as tolerated.  Avoid heavy, spicy, and greasy meals for 2-3 days.    Drink plenty of liquids to stay hydrated.     It is not uncommon to experience some loose stools or diarrhea after surgery.  This is your body s way of adapting to the bile which will slowly drain into your intestine. A low fat diet may help with this.  This should improve over 1-2 months.     PAIN    Expect some tenderness and discomfort at the incision sites.  Use the prescribed pain medication at your discretion.  Expect gradual  resolution of your pain over several days.    You may take ibuprofen with food (unless you have been told not to) instead of or in addition to your prescribed pain medication.  If you are taking Norco or Percocet, do not take any additional acetaminophen/APAP/Tylenol.    Do not drink alcohol or drive while you are taking pain medications.    You may apply ice to your incisions in 20 minute intervals as needed for the next 48 hours.  After that time, consider switching to heat if you prefer.    EXPECTATIONS    Pain medications can cause constipation.  Limit use when possible.  Take over the counter stool softener/stimulant, such as Colace or Senna, 1-2 times a day with plenty of water.  You may take a mild over the counter laxative, such as Miralax or a suppository, as needed.  You may discontinue these medications once you are having regular bowel movements and/or are no longer taking your narcotic pain medication.      You may have shoulder or upper back discomfort due to the gas used in surgery.  This is temporary and should resolve in 48-72 hours.  Short, frequent walks may help with this.      FOLLOW UP    Our office will contact you approximately 2 weeks to check on your progress and answer any questions you may have.  If you are doing well, you will not need to return for a follow up appointment.  If any concerns are identified over the phone, we will help you make an appointment to see a provider.    CALL OUR OFFICE IF YOU HAVE:     Chills or fever above 101.5 F.    Increased redness or drainage at your incisions.    Significant bleeding.    Pain not relieved by your pain medication or rest.    Increasing pain after the first 48 hours.    Any other concerns or questions.      Revised January 2018    **If you have concerns or questions about your procedure,    please contact Dr Jo at  [phone]**        Pending Results     No orders found from 9/2/2018 to 9/5/2018.            Admission Information      "Date & Time Provider Department Dept. Phone    9/4/2018 Kenneth Jo MD [facility] Same Day Surgery [phone]      Your Vitals Were     Blood Pressure Temperature Respirations Height Weight Last Period    126/79 97.7  F (36.5  C) (Temporal) 16 1.689 m (5' 6.5\") 104.1 kg (229 lb 9.6 oz) 08/13/2018 (Exact Date)    Pulse Oximetry BMI (Body Mass Index)                94% 36.5 kg/m2          MyChart Information     [employer] gives you secure access to your electronic health record. If you see a primary care provider, you can also send messages to your care team and make appointments. If you have questions, please call your primary care clinic.  If you do not have a primary care provider, please call [phone] and they will assist you.        Care EveryWhere ID     This is your Care EveryWhere ID. This could be used by other organizations to access your [facility] medical records  [id number]        Equal Access to Services     QIAN SANCHEZ AH: Hadii john lunsfordo Soeusebio, waaxda luqadaha, qaybta kaalmada adeegyalilia, soren davila . So [facility] [phone].    ATENCIÓN: Si habla español, tiene a chirinos disposición servicios gratuitos de asistencia lingüística. Llame al [phone].    We comply with applicable federal civil rights laws and [place] laws. We do not discriminate on the basis of race, color, national origin, age, disability, sex, sexual orientation, or gender identity.               Review of your medicines      START taking        Dose / Directions    HYDROcodone-acetaminophen 5-325 MG per tablet   Commonly known as:  NORCO   Used for:  Symptomatic cholelithiasis   Notes to Patient:  One oxycodone was given at 5:38 PM 9/4/18        Dose:  1-2 tablet   Take 1-2 tablets by mouth every 4 hours as needed for other (Moderate to Severe Pain)   Quantity:  20 tablet   Refills:  0         CONTINUE these medicines which have NOT CHANGED        Dose / Directions    B-12-SL SL "        Dose:  1000 mcg   Place 1,000 mcg under the tongue daily   Refills:  0       calcium citrate-vitamin D 315-250 MG-UNIT Tabs per tablet   Commonly known as:  CITRACAL        Dose:  2 tablet   Take 2 tablets by mouth 2 times daily   Refills:  0       escitalopram 10 MG tablet   Commonly known as:  LEXAPRO   Used for:  Generalized anxiety disorder        Dose:  10 mg   Take 1 tablet (10 mg) by mouth daily   Quantity:  90 tablet   Refills:  1       multivitamin  peds with iron 60 MG chewable tablet        Dose:  2 chew tab   Take 2 chew tab by mouth every morning   Refills:  0       OMEPRAZOLE PO        Dose:  20 mg   Take 20 mg by mouth daily   Refills:  0       VITAMIN D (CHOLECALCIFEROL) PO        Dose:  2000 Units   Take 2,000 Units by mouth daily   Refills:  0       VITRON-C PO        Dose:  1 tablet   Take 1 tablet by mouth every morning   Refills:  0            Where to get your medicines      Some of these will need a paper prescription and others can be bought over the counter. Ask your nurse if you have questions.     Bring a paper prescription for each of these medications     HYDROcodone-acetaminophen 5-325 MG per tablet                Protect others around you: Learn how to safely use, store and throw away your medicines at www.disposemymeds.org.        Information about OPIOIDS     PRESCRIPTION OPIOIDS: WHAT YOU NEED TO KNOW   We gave you an opioid (narcotic) pain medicine. It is important to manage your pain, but opioids are not always the best choice. You should first try all the other options your care team gave you. Take this medicine for as short a time (and as few doses) as possible.    Some activities can increase your pain, such as bandage changes or therapy sessions. It may help to take your pain medicine 30 to 60 minutes before these activities. Reduce your stress by getting enough sleep, working on hobbies you enjoy and practicing relaxation or meditation. Talk to your care team about  ways to manage your pain beyond prescription opioids.    These medicines have risks:    DO NOT drive when on new or higher doses of pain medicine. These medicines can affect your alertness and reaction times, and you could be arrested for driving under the influence (DUI). If you need to use opioids long-term, talk to your care team about driving.    DO NOT operate heavy machinery    DO NOT do any other dangerous activities while taking these medicines.    DO NOT drink any alcohol while taking these medicines.     If the opioid prescribed includes acetaminophen, DO NOT take with any other medicines that contain acetaminophen. Read all labels carefully. Look for the word  acetaminophen  or  Tylenol.  Ask your pharmacist if you have questions or are unsure.    You can get addicted to pain medicines, especially if you have a history of addiction (chemical, alcohol or substance dependence). Talk to your care team about ways to reduce this risk.    All opioids tend to cause constipation. Drink plenty of water and eat foods that have a lot of fiber, such as fruits, vegetables, prune juice, apple juice and high-fiber cereal. Take a laxative (Miralax, milk of magnesia, Colace, Senna) if you don t move your bowels at least every other day. Other side effects include upset stomach, sleepiness, dizziness, throwing up, tolerance (needing more of the medicine to have the same effect), physical dependence and slowed breathing.    Store your pills in a secure place, locked if possible. We will not replace any lost or stolen medicine. If you don t finish your medicine, please throw away (dispose) as directed by your pharmacist. The [place] [employer]ution [employer] has more information about safe disposal: https://www.pca.[facility].mn.us/living-green/managing-unwanted-medications             Medication List: This is a list of all your medications and when to take them. Check marks below indicate your daily home schedule. Keep this  list as a reference.      Medications           Morning Afternoon Evening Bedtime As Needed    B-12-SL SL   Place 1,000 mcg under the tongue daily                                calcium citrate-vitamin D 315-250 MG-UNIT Tabs per tablet   Commonly known as:  CITRACAL   Take 2 tablets by mouth 2 times daily                                escitalopram 10 MG tablet   Commonly known as:  LEXAPRO   Take 1 tablet (10 mg) by mouth daily                                HYDROcodone-acetaminophen 5-325 MG per tablet   Commonly known as:  NORCO   Take 1-2 tablets by mouth every 4 hours as needed for other (Moderate to Severe Pain)   Notes to Patient:  One oxycodone was given at 5:38 PM 9/4/18                                multivitamin  peds with iron 60 MG chewable tablet   Take 2 chew tab by mouth every morning                                OMEPRAZOLE PO   Take 20 mg by mouth daily                                VITAMIN D (CHOLECALCIFEROL) PO   Take 2,000 Units by mouth daily                                VITRON-C PO   Take 1 tablet by mouth every morning                                          More Information        Discharge Instructions for Laparoscopic Cholecystectomy  You have had a procedure known as a laparoscopic cholecystectomy. A laparoscopic cholecystectomy is a procedure to remove your gallbladder. People who have this procedure usually recover more quickly and have less pain than with open gallbladder surgery (called open cholecystectomy). Many surgeons recommend a low-fat diet, avoiding fried food in particular, for the first month after surgery.   You can live a full and healthy life without your gallbladder. This includes eating the foods and doing the things you enjoyed before your gallbladder problems started.  Home care  Recommendations for home care include the following:     Ask someone to drive you to your appointments for the next 3 days. Don t drive until you are no longer taking pain medicine and  are able to step on the brake pedal without hesitation.     Wash the skin around your incision daily with mild soap and water. It's OK to shower the day after your surgery.    Eat your regular diet. It is wise to stay away from rich, greasy, or spicy food for a few days.    Remember, it takes at least 1 week for you to get most of your strength and energy back.    Make an office visit to talk to your healthcare provider if the following symptoms don t go away within a week after your surgery:  ? Fatigue  ? Pain around the incision  ? Diarrhea or constipation  ? Loss of appetite     When to call your healthcare provider  Call your healthcare provider immediately if you have any of the following:    Yellowing of your eyes or skin (jaundice)    Chills    Fever of 100.4 F (38.0 C) or higher, or as directed by your healthcare provider     Redness, swelling, increasing pain, pus, or a foul smell at the incision site    Dark or rust-colored urine    Stool that is lina-colored or light in color instead of brown    Increasing belly pain    Rectal bleeding    Leg swelling or shortness of breath   Date Last Reviewed: 7/1/2016 2000-2017 [employer]. [address] [place] [address], [place] [address]. All rights reserved. This information is not intended as a substitute for professional medical care. Always follow your healthcare professional's instructions.

## 2018-09-04 NOTE — OR NURSING
PNDS met, po per I&O sheet. Pt Clarissa Paulino dressed, up in recliner and transported to Phase 2.

## 2018-09-04 NOTE — DISCHARGE INSTRUCTIONS
Same Day Surgery Discharge Instructions for  Sedation and General Anesthesia       It's not unusual to feel dizzy, light-headed or faint for up to 24 hours after surgery or while taking pain medication.  If you have these symptoms: sit for a few minutes before standing and have someone assist you when you get up to walk or use the bathroom.      You should rest and relax for the next 24 hours. We recommend you make arrangements to have an adult stay with you for at least 24 hours after your discharge.  Avoid hazardous and strenuous activity.      DO NOT DRIVE any vehicle or operate mechanical equipment for 24 hours following the end of your surgery.  Even though you may feel normal, your reactions may be affected by the medication you have received.      Do not drink alcoholic beverages for 24 hours following surgery.       Slowly progress to your regular diet as you feel able. It's not unusual to feel nauseated and/or vomit after receiving anesthesia.  If you develop these symptoms, drink clear liquids (apple juice, ginger ale, broth, 7-up, etc. ) until you feel better.  If your nausea and vomiting persists for 24 hours, please notify your surgeon.        All narcotic pain medications, along with inactivity and anesthesia, can cause constipation. Drinking plenty of liquids and increasing fiber intake will help.      For any questions of a medical nature, call your surgeon.      Do not make important decisions for 24 hours.      If you had general anesthesia, you may have a sore throat for a couple of days related to the breathing tube used during surgery.  You may use Cepacol lozenges to help with this discomfort.  If it worsens or if you develop a fever, contact your surgeon.       If you feel your pain is not well managed with the pain medications prescribed by your surgeon, please contact your surgeon's office to let them know so they can address your concerns.       [facility]  Discharge Instructions: Post-Operative Laparoscopic Cholecystectomy    ACTIVITY    Expect to feel tired after your surgery.  This will gradually resolve.    Take frequent, short walks and increase your activity gradually.      Avoid strenuous physical activity or heavy lifting greater than 15-20 lbs. for 2-3 weeks.  You may climb stairs.    You may drive without restrictions when you are not using any prescription pain medication and comfortable in a car.    You may return to work/school when you are comfortable without any prescription pain medication.    WOUND CARE    You may remove your outer dressing or Band-Aids and shower 48 hours after the surgery.  Pat your incisions dry and leave open to air.  Re-apply dressing (Band-aid or gauze/tape) as needed for comfort or drainage.    You may have steri-strips (looks like white tape) on your incision.  You may peel off the steri-strip 2 weeks after surgery if they have not peeled off on their own.    Do not soak your incisions in a tub or pool for 2 weeks.     Do not apply any lotions, creams, or ointments to your incisions.    A ridge under incisions is normal and will gradually resolve.    DIET    Start with liquids, then gradually resume your regular diet as tolerated.  Avoid heavy, spicy, and greasy meals for 2-3 days.    Drink plenty of liquids to stay hydrated.     It is not uncommon to experience some loose stools or diarrhea after surgery.  This is your body s way of adapting to the bile which will slowly drain into your intestine. A low fat diet may help with this.  This should improve over 1-2 months.     PAIN    Expect some tenderness and discomfort at the incision sites.  Use the prescribed pain medication at your discretion.  Expect gradual resolution of your pain over several days.    You may take ibuprofen with food (unless you have been told not to) instead of or in addition to your prescribed pain medication.  If you are taking Norco or  Percocet, do not take any additional acetaminophen/APAP/Tylenol.    Do not drink alcohol or drive while you are taking pain medications.    You may apply ice to your incisions in 20 minute intervals as needed for the next 48 hours.  After that time, consider switching to heat if you prefer.    EXPECTATIONS    Pain medications can cause constipation.  Limit use when possible.  Take over the counter stool softener/stimulant, such as Colace or Senna, 1-2 times a day with plenty of water.  You may take a mild over the counter laxative, such as Miralax or a suppository, as needed.  You may discontinue these medications once you are having regular bowel movements and/or are no longer taking your narcotic pain medication.      You may have shoulder or upper back discomfort due to the gas used in surgery.  This is temporary and should resolve in 48-72 hours.  Short, frequent walks may help with this.      FOLLOW UP    Our office will contact you approximately 2 weeks to check on your progress and answer any questions you may have.  If you are doing well, you will not need to return for a follow up appointment.  If any concerns are identified over the phone, we will help you make an appointment to see a provider.    CALL OUR OFFICE IF YOU HAVE:     Chills or fever above 101.5 F.    Increased redness or drainage at your incisions.    Significant bleeding.    Pain not relieved by your pain medication or rest.    Increasing pain after the first 48 hours.    Any other concerns or questions.      Revised January 2018    **If you have concerns or questions about your procedure,    please contact Dr Jo at  [phone]**

## 2018-09-04 NOTE — OR NURSING
Assume patient care for Tracey Paulino at 1740 PM.  Verbal report was  received from Makeda FLORES.

## 2018-09-04 NOTE — IP AVS SNAPSHOT
[facility]    [address]    [place] [address]    Phone:  [phone]    Fax:  [phone]                                       After Visit Summary   9/4/2018    Clarissa Paulino    MRN: [id number]           After Visit Summary Signature Page     I have received my discharge instructions, and my questions have been answered. I have discussed any challenges I see with this plan with the nurse or doctor.    ..........................................................................................................................................  Patient/Patient Representative Signature      ..........................................................................................................................................  Patient Representative Print Name and Relationship to Patient    ..................................................               ................................................  Date                                            Time    ..........................................................................................................................................  Reviewed by Signature/Title    ...................................................              ..............................................  Date                                                            Time          22EPIC Rev 08/18

## 2018-09-04 NOTE — ANESTHESIA PREPROCEDURE EVALUATION
Anesthesia Evaluation     . Pt has had prior anesthetic.     No history of anesthetic complications          ROS/MED HX    ENT/Pulmonary:      (-) sleep apnea   Neurologic:       Cardiovascular:        (-) HA, orthopnea/PND and syncope   METS/Exercise Tolerance:  >4 METS   Hematologic:         Musculoskeletal:         GI/Hepatic: Comment: Gastric sleeve, post surgical malabsorption      (+) cholecystitis/cholelithiasis,      (-) GERD   Renal/Genitourinary:         Endo:     (+) Obesity, .      Psychiatric:     (+) psychiatric history anxiety      Infectious Disease:         Malignancy:         Other:                     Physical Exam  Normal systems: dental    Airway   Mallampati: II  TM distance: >3 FB  Neck ROM: full    Dental     Cardiovascular   Rhythm and rate: regular      Pulmonary    breath sounds clear to auscultation                    Anesthesia Plan      History & Physical Review  History and physical reviewed and following examination; no interval change.    ASA Status:  2 .        Plan for General and ETT   PONV prophylaxis:  Ondansetron (or other 5HT-3)  Propofol infusion      Postoperative Care      Consents  Anesthetic plan, risks, benefits and alternatives discussed with:  Patient..                          .  Procedure: Procedure(s):  LAPAROSCOPIC CHOLECYSTECTOMY  Preop diagnosis: GALLSTONES    Allergies   Allergen Reactions     No Clinical Screening - See Comments Anaphylaxis     Mice droppings     Past Medical History:   Diagnosis Date     Gastroesophageal reflux disease      Obese      Right upper quadrant pain      Past Surgical History:   Procedure Laterality Date     ENT SURGERY      wisdom teeth     LAPAROSCOPIC GASTRIC SLEEVE N/A 8/7/2017    Procedure: LAPAROSCOPIC GASTRIC SLEEVE;  LAPAROSCOPIC GASTRIC SLEEVE;  Surgeon: Sloan Burnette MD;  Location:  OR     ORTHOPEDIC SURGERY  1991    Left elbow     Social History   Substance Use Topics     Smoking status: Never Smoker      Smokeless tobacco: Never Used     Alcohol use Yes      Comment: rare     Prior to Admission medications    Medication Sig Start Date End Date Taking? Authorizing Provider   calcium citrate-vitamin D (CITRACAL) 315-250 MG-UNIT TABS per tablet Take 2 tablets by mouth 2 times daily    Yes Reported, Patient   Cyanocobalamin (B-12-SL SL) Place 1,000 mcg under the tongue daily    Yes Reported, Patient   escitalopram (LEXAPRO) 10 MG tablet Take 1 tablet (10 mg) by mouth daily 1/31/18  Yes Krista Landaverde MD   HYDROcodone-acetaminophen (NORCO) 5-325 MG per tablet Take 1-2 tablets by mouth every 4 hours as needed for other (Moderate to Severe Pain) 9/4/18  Yes Awilda Gan, DO   Iron-Vitamin C (VITRON-C PO) Take 1 tablet by mouth every morning   Yes Reported, Patient   multivitamin  peds with iron (FLINTSTONES COMPLETE) 60 MG chewable tablet Take 2 chew tab by mouth every morning   Yes Reported, Patient   OMEPRAZOLE PO Take 20 mg by mouth daily   Yes Reported, Patient   VITAMIN D, CHOLECALCIFEROL, PO Take 2,000 Units by mouth daily   Yes Reported, Patient     Current Facility-Administered Medications Ordered in Epic   Medication Dose Route Frequency Last Rate Last Dose     ceFAZolin (ANCEF) 1 g vial to attach to  ml bag for ADULT or 50 ml bag for PEDS  1 g Intravenous See Admin Instructions         ceFAZolin (ANCEF) intermittent infusion 2 g in 100 mL dextrose PRE-MIX  2 g Intravenous Pre-Op/Pre-procedure x 1 dose         Current Outpatient Prescriptions Ordered in Epic   Medication     HYDROcodone-acetaminophen (NORCO) 5-325 MG per tablet       Wt Readings from Last 1 Encounters:   09/04/18 104.1 kg (229 lb 9.6 oz)     Temp Readings from Last 1 Encounters:   09/04/18 36.7  C (98  F) (Oral)     BP Readings from Last 6 Encounters:   09/04/18 131/86   08/29/18 116/76   08/21/18 110/70   08/08/18 129/79   02/14/18 118/70   01/31/18 118/76     Pulse Readings from Last 4 Encounters:   08/29/18 70   08/21/18 65    08/08/18 67   02/14/18 68     Resp Readings from Last 1 Encounters:   09/04/18 16     SpO2 Readings from Last 1 Encounters:   09/04/18 99%     Recent Labs   Lab Test  08/08/17   0750  08/07/17   1314  05/16/17   0801  06/28/16   1603   NA  140   --   138  137   POTASSIUM  3.5   --   4.2  3.6   CHLORIDE  105   --   102  102   CO2  27   --   30  28   ANIONGAP  8   --   6  7   GLC   --    --   98  81   BUN   --    --   10  20   CR   --   0.60  0.74  0.83   JUSTIN   --    --   9.0  8.1*     Recent Labs   Lab Test  05/16/17   0801  06/28/16   1603   AST  31  30   ALT  37  40   ALKPHOS  80  56   BILITOTAL  0.9  0.9     Recent Labs   Lab Test  08/09/18   1802  01/31/18   1141   WBC  12.5*  9.6   HGB  13.0  14.5   PLT  245  255     No results for input(s): ABO, RH in the last 28391 hours.  No results for input(s): INR, PTT in the last 54221 hours.   No results for input(s): TROPI in the last 50828 hours.  No results for input(s): PH, PCO2, PO2, HCO3 in the last 32279 hours.  Recent Labs   Lab Test  09/04/18   1240   HCG  Negative     No results found for this or any previous visit (from the past 744 hour(s)).    RECENT LABS:   ECG:   ECHO:

## 2018-09-04 NOTE — ANESTHESIA CARE TRANSFER NOTE
Patient: Clarissa Paulino    Procedure(s):  LAPAROSCOPIC CHOLECYSTECTOMY  - Wound Class: II-Clean Contaminated    Diagnosis: GALLSTONES  Diagnosis Additional Information: No value filed.    Anesthesia Type:   General, ETT     Note:  Airway :Face Mask  Patient transferred to:PACU  Comments: Neuromuscular blockade reversed after TOF 4/4, spontaneous respirations, adequate tidal volumes, followed commands to voice, oropharynx suctioned with soft flexible catheter, extubated atraumatically, extubated with suction, airway patent after extubation.  Oxygen via facemask at 10 liters per minute to PACU. Oxygen tubing connected to wall O2 in PACU, SpO2, NiBP, and EKG monitors and alarms on and functioning, Conrado Hugger warmer connected to patient gown, report on patient's clinical status given to PACU RN, RN questions answered. Handoff Report: Identifed the Patient, Identified the Reponsible Provider, Reviewed the pertinent medical history, Discussed the surgical course, Reviewed Intra-OP anesthesia mangement and issues during anesthesia, Set expectations for post-procedure period and Allowed opportunity for questions and acknowledgement of understanding      Vitals: (Last set prior to Anesthesia Care Transfer)    CRNA VITALS  9/4/2018 1522 - 9/4/2018 1600      9/4/2018             Pulse: 87    SpO2: 96 %    Resp Rate (observed): 24    Resp Rate (set): 10                Electronically Signed By: CARI Foster CRNA  September 4, 2018  4:00 PM

## 2018-09-04 NOTE — BRIEF OP NOTE
[facility] Brief Operative Note    Pre-operative diagnosis: GALLSTONES   Post-operative diagnosis Same as above   Procedure: Procedure(s):  LAPAROSCOPIC CHOLECYSTECTOMY  - Wound Class: II-Clean Contaminated   Surgeon(s): Surgeon(s) and Role:     * Kenneth Jo MD - Primary     * Awilda Gan DO - Resident - Assisting   Estimated blood loss: 10 mL    Specimens:   ID Type Source Tests Collected by Time Destination   A : gallbladder and contents Tissue Gallbladder and Contents SURGICAL PATHOLOGY EXAM Kenneth Jo MD 9/4/2018  3:38 PM       Findings: Gallbladder with small cystic duct and cystic artery visualized within critical view and clipped. Intrahepatic gallbladder removed from the gallbladder bed with cautery.     Awilda Gan DO PGY4  Department of Surgery

## 2018-09-05 NOTE — ANESTHESIA POSTPROCEDURE EVALUATION
Patient: Clarissa Paulino    Procedure(s):  LAPAROSCOPIC CHOLECYSTECTOMY  - Wound Class: II-Clean Contaminated    Diagnosis:GALLSTONES  Diagnosis Additional Information: No value filed.    Anesthesia Type:  General, ETT    Note:  Anesthesia Post Evaluation    Patient location during evaluation: PACU  Patient participation: Able to fully participate in evaluation  Level of consciousness: awake and alert  Pain management: adequate  Airway patency: patent  Cardiovascular status: acceptable  Respiratory status: acceptable  Hydration status: acceptable  PONV: none     Anesthetic complications: None          Last vitals:  Vitals:    09/04/18 1745 09/04/18 1810 09/04/18 1820   BP: 126/79  122/67   Resp: 16  16   Temp: 36.1  C (97  F)     SpO2: 94% 94% 95%         Electronically Signed By: Bridgette Lang MD  September 4, 2018  9:19 PM

## 2018-09-05 NOTE — OP NOTE
General Surgery Operative Note    PREOPERATIVE DIAGNOSIS:  GALLSTONES    POSTOPERATIVE DIAGNOSIS:  Same    PROCEDURE:   Procedure(s):  LAPAROSCOPIC CHOLECYSTECTOMY    ANESTHESIA:  General.    PREOPERATIVE MEDICATIONS:  Ancef IV.    SURGEON:  Kenneth Jo MD    ASSISTANT:  Awilda Gan DO  A first assistant was necessary owing to challenging laparoscopic visualization and exposure.  Retraction was also necessary.    INDICATIONS:  Pt with gallstones and biliary colic.  History or RYGBP    PROCEDURE:  The patient was taken to the operating suite and uneventfully endotracheally intubated.  The abdomen was prepped and draped in a sterile fashion.  Surgeon initiated timeout was acknowledged.  We entered the abdomen in the left upper quadrant using Visiport technique.  Three other trocars were placed under laparoscopic visualization.  We elevated the liver and were able to identify a somewhat inflamed gallbladder.  The gallbladder was grasped and used to elevate the liver further.  We began dissecting out some fatty adhesions down near the neck of the gallbladder until a cystic duct was encountered.  We continued our dissection using combination of sharp and blunt dissection until the cystic duct was largely dissected out.  We continued our dissection up along the sides of the gallbladder, both medially and laterally, until we had created a space between the gallbladder and the liver.  At this point, we encountered the cystic artery, just posterior and lateral to the cystic duct.  This again was dissected out.  Once we had created a window where only the cystic artery and duct were noted to be entering the gallbladder, we felt that this represented our critical view.  The cystic artery and duct were then doubly clipped and divided.  We continued our dissection up along the body of the gallbladder, freeing all attachments and adhesions of the gallbladder to the liver.  Gallbladder was removed from the liver in an  atraumatic fashion.  The gallbladder was then brought up through the umbilical port site and removed from the abdomen.  The gallbladder fossa was reinspected, and all areas of bleeding were managed with electrocautery.  We irrigated the area with normal saline and aspirated it out.  We then removed the umbilical port trocar and closed the fascia with a figure-of-eight 0 Vicryl suture.  This was done using the Gui-Pattie device.  We then reinspected the abdomen, and everything appeared to be in pristine condition.  We removed the trocars under laparoscopic visualization and desufflated the abdomen with the Lowland suction .  The skin edges were reapproximated with 4-0 Vicryl and Steri-Strips.  The patient was uneventfully extubated, awakened and taken to the PACU in stable condition.  At the conclusion of the case, all lap and needle counts were correct.      ESTIMATED BLOOD LOSS:  15 mL    INTRAOPERATIVE FINDINGS:  Dense adhesions surrounding a gallbladder with stones.    Kenneth Jo MD, MD

## 2018-09-06 LAB — COPATH REPORT: NORMAL

## 2018-09-19 ENCOUNTER — TELEPHONE (OUTPATIENT)
Dept: SURGERY | Facility: CLINIC | Age: 33
End: 2018-09-19

## 2018-09-19 NOTE — TELEPHONE ENCOUNTER
[facility]  Post op call note - Laparoscopic Cholecystectomy    Clarissa Paulino was called for an update regarding her recovery.  She underwent a laparoscopic cholecystectomy by Dr. Jo on 9/4/18.  Today she tells me she is doing well and denies any complaints.  She currently does not need any pain medications.  She is eating a normal diet and her bowels are regular.   The patient states she is slowly resuming normal activity.  She denies any erythema or drainage at her wounds.  The patient denies fever/chills, n/v/d, abdominal pain, changes in urination or BM, or wound concerns.       The pathology revealed chronic cholecystitis and cholelithiasis.  This was discussed with the patient.  She was instructed to remove steri strips and continue to keep wounds clean.  She was advised to advance her activity as tolerated with no heavy lifting > 20 lbs or strenuous exercise x 1-2 weeks.  The patient states all of her questions were answered and she understands our discussion.  She agrees to follow up as needed and to call our office with any concerns.    Blanca Hernandez PA-C  Please route or send letter to:  Primary Care Provider (PCP)

## 2018-11-14 DIAGNOSIS — F41.1 GENERALIZED ANXIETY DISORDER: ICD-10-CM

## 2018-11-14 NOTE — TELEPHONE ENCOUNTER
"Requested Prescriptions   Pending Prescriptions Disp Refills     escitalopram (LEXAPRO) 10 MG tablet  Last Written Prescription Date:  01/31/18  Last Fill Quantity: 90,  # refills: 1   Last Office Visit with G, P or [facility] prescribing provider:  8/29/18Cornelius   Future Office Visit:    90 tablet 1     Sig: Take 1 tablet (10 mg) by mouth daily    SSRIs Protocol Passed    11/14/2018  2:23 PM       Passed - Recent (12 mo) or future (30 days) visit within the authorizing provider's specialty    Patient had office visit in the last 12 months or has a visit in the next 30 days with authorizing provider or within the authorizing provider's specialty.  See \"Patient Info\" tab in inbasket, or \"Choose Columns\" in Meds & Orders section of the refill encounter.             Passed - Patient is age 18 or older       Passed - No active pregnancy on record       Passed - No positive pregnancy test in last 12 months          "

## 2018-11-19 RX ORDER — ESCITALOPRAM OXALATE 10 MG/1
10 TABLET ORAL DAILY
Qty: 90 TABLET | Refills: 0 | Status: SHIPPED | OUTPATIENT
Start: 2018-11-19 | End: 2019-02-13

## 2018-12-07 ENCOUNTER — TELEPHONE (OUTPATIENT)
Dept: OTHER | Facility: CLINIC | Age: 33
End: 2018-12-07

## 2018-12-07 NOTE — TELEPHONE ENCOUNTER
12/7/2018    Call Regarding Onboarding:     Attempt 1    Message on voicemail     Comments: spouse dep      Outreach   SV

## 2018-12-14 NOTE — TELEPHONE ENCOUNTER
12/13/2018    Call Regarding Onboarding: P1 - MMB    Attempt 2    Message on voicemail     Comments: spouse dep      Outreach   SV

## 2018-12-18 NOTE — TELEPHONE ENCOUNTER
.12/18/2018    Call Regarding Onboarding P1 MMB    Attempt 3    Message on voicemail     Comments:       Outreach   CWR

## 2019-01-07 NOTE — Clinical Note
HAND-OFF: 

Report given to CAROLYN Baron. Observed pt lying on bed, no acute distress noted at this 
time. Please send pre op clearance to surgeon, thanks! Krista Landaverde MD MPH

## 2019-01-25 ENCOUNTER — TRANSFERRED RECORDS (OUTPATIENT)
Dept: HEALTH INFORMATION MANAGEMENT | Facility: CLINIC | Age: 34
End: 2019-01-25

## 2019-01-26 ENCOUNTER — OFFICE VISIT (OUTPATIENT)
Dept: URGENT CARE | Facility: URGENT CARE | Age: 34
End: 2019-01-26
Payer: COMMERCIAL

## 2019-01-26 VITALS
HEART RATE: 90 BPM | OXYGEN SATURATION: 96 % | TEMPERATURE: 98.5 F | DIASTOLIC BLOOD PRESSURE: 74 MMHG | WEIGHT: 234 LBS | SYSTOLIC BLOOD PRESSURE: 113 MMHG | BODY MASS INDEX: 37.2 KG/M2

## 2019-01-26 DIAGNOSIS — J06.9 VIRAL URI WITH COUGH: Primary | ICD-10-CM

## 2019-01-26 LAB
FLUAV+FLUBV AG SPEC QL: NEGATIVE
FLUAV+FLUBV AG SPEC QL: POSITIVE
SPECIMEN SOURCE: ABNORMAL

## 2019-01-26 PROCEDURE — 87804 INFLUENZA ASSAY W/OPTIC: CPT | Performed by: FAMILY MEDICINE

## 2019-01-26 PROCEDURE — 99213 OFFICE O/P EST LOW 20 MIN: CPT | Performed by: FAMILY MEDICINE

## 2019-01-26 RX ORDER — CODEINE PHOSPHATE AND GUAIFENESIN 10; 100 MG/5ML; MG/5ML
1-2 SOLUTION ORAL
Qty: 118 ML | Refills: 0 | Status: SHIPPED | OUTPATIENT
Start: 2019-01-26 | End: 2019-05-22

## 2019-01-26 RX ORDER — ALBUTEROL SULFATE 90 UG/1
AEROSOL, METERED RESPIRATORY (INHALATION)
COMMUNITY
Start: 2019-01-25 | End: 2019-05-22

## 2019-01-26 RX ORDER — BENZONATATE 100 MG/1
CAPSULE ORAL
COMMUNITY
Start: 2019-01-25 | End: 2019-05-22

## 2019-01-26 RX ORDER — FLUTICASONE PROPIONATE 50 MCG
SPRAY, SUSPENSION (ML) NASAL
COMMUNITY
Start: 2019-01-25 | End: 2019-05-22

## 2019-01-26 NOTE — PATIENT INSTRUCTIONS
Continue the tessalon every 8 hours as needed  If cough is bothersome okay to take dextromethorphan (delsym/robitussin) with the tessalon    Bedtime take the robitussin/codeine (don't take with alcohol or sedatives)

## 2019-01-26 NOTE — PROGRESS NOTES
Subjective:   Clarissa Pedroza is a 33 year old female who presents for   Chief Complaint   Patient presents with     Cough     Patient complains of cough, runny nose, low grade fever      Cough starting 3 days ago but was exposed to someone with flu earlier this week.   Had muscle aches , congestion, low grade fevers. Taking tylenol mainly - uses ibuprofen sparingly (hx of gastric sleeve)    [facility] and got tessalon and an inhaler for apparent wheezing that may have been there. She used inhaler 3 hours ago. No hx of asthma.     Patient Active Problem List    Diagnosis Date Noted     Symptomatic cholelithiasis 08/22/2018     Priority: Medium     Obesity (BMI 30-39.9) 02/14/2018     Priority: Medium     GERD without esophagitis 02/14/2018     Priority: Medium     Postsurgical malabsorption 02/14/2018     Priority: Medium     Bariatric surgery status 08/15/2017     Priority: Medium     History of infertility 05/10/2017     Priority: Medium     MONTEZ (generalized anxiety disorder) 03/28/2016     Priority: Medium     CARDIOVASCULAR SCREENING; LDL GOAL LESS THAN 160 10/31/2010     Priority: Medium       Current Outpatient Medications   Medication     benzonatate (TESSALON) 100 MG capsule     escitalopram (LEXAPRO) 10 MG tablet     fluticasone (FLONASE) 50 MCG/ACT nasal spray     guaiFENesin-codeine (ROBITUSSIN AC) 100-10 MG/5ML solution     PROAIR  (90 Base) MCG/ACT inhaler     calcium citrate-vitamin D (CITRACAL) 315-250 MG-UNIT TABS per tablet     Cyanocobalamin (B-12-SL SL)     HYDROcodone-acetaminophen (NORCO) 5-325 MG per tablet     Iron-Vitamin C (VITRON-C PO)     multivitamin  peds with iron (FLINTSTONES COMPLETE) 60 MG chewable tablet     OMEPRAZOLE PO     VITAMIN D, CHOLECALCIFEROL, PO     No current facility-administered medications for this visit.      ROS:  As above per HPI    Objective:   /74 (BP Location: Left arm, Patient Position: Chair, Cuff Size:  Adult Regular)   Pulse 90   Temp 98.5  F (36.9  C) (Oral)   Wt 106.1 kg (234 lb)   SpO2 96%   BMI 37.20 kg/m  , Body mass index is 37.2 kg/m .  Gen:  NAD, well-nourished, sitting in chair comfortably  HEENT: EOMI, sclera anicteric, Head normocephalic, ; nares patent; moist mucous membranes  Neck: trachea midline, no thyromegaly  CV:  Hemodynamically stable, RRR  Pulm:  no increased work of breathing , CTAB, no wheezes/rales/rhonchi   Extrem: no cyanosis, edema or clubbing  Skin: no obvious rashes or abnormalities  Psych: Euthymic, linear thoughts, normal rate of speech    Assessment & Plan:    Clarissa Pedroza, 33 year old female who presents with:  Viral URI with cough  Tessalon and dextromethorphan daytime for cough. Night time can try the robitussin/codeine. No wheezing on exam, stable vital signs, afebrile. Out of window for flu treatment but she requests to be checked in case family members become ill. Well hydrated today. Supportive therapy recommended. F/u as needed.   - guaiFENesin-codeine (ROBITUSSIN AC) 100-10 MG/5ML solution  Dispense: 118 mL; Refill: 0  - Influenza A/B antigen        Mayo Zhang MD   [facility]    The use of Dragon/[employer] dictation services may have been used to construct the content in this note; any grammatical or spelling errors are non-intentional. Please contact the author of this note directly if you are in need of any clarification.

## 2019-01-27 ENCOUNTER — MYC MEDICAL ADVICE (OUTPATIENT)
Dept: FAMILY MEDICINE | Facility: CLINIC | Age: 34
End: 2019-01-27

## 2019-01-27 NOTE — LETTER
January 29, 2019      Clarissa Pedroza  [address] [place] [id number]        To Whom It May Concern:    Clarissa Pedroza was seen on 1/26/19.  Please excuse her until 1/29/19 due to illness.        Sincerely,        Krista Landaverde MD MPH

## 2019-01-29 NOTE — TELEPHONE ENCOUNTER
Routing to provider to provide work note, patient would like to pick this up from the clinic.  Oneida Fuller MA/  For [employer] and [employer]andana

## 2019-01-29 NOTE — TELEPHONE ENCOUNTER
Bringing signed letter to the  by 1:00 pm today, 1/29/19. Sent patient a My Chart regarding this.  Oneida Fuller MA/  For Teams Maggie

## 2019-02-13 DIAGNOSIS — F41.1 GENERALIZED ANXIETY DISORDER: ICD-10-CM

## 2019-02-14 NOTE — TELEPHONE ENCOUNTER
"Requested Prescriptions   Pending Prescriptions Disp Refills     escitalopram (LEXAPRO) 10 MG tablet [Pharmacy Med Name: Escitalopram Oxalate Oral Tablet 10 MG] 90 tablet 0          Last Written Prescription Date:  11/19/18  Last Fill Quantity: 90,  # refills: 0   Last Office Visit with [facility], P or [facility] prescribing provider:  8/29/18   Future Office Visit:      Sig: Take 1 tablet (10 mg) by mouth daily    SSRIs Protocol Passed - 2/13/2019  6:02 PM       Passed - Recent (12 mo) or future (30 days) visit within the authorizing provider's specialty    Patient had office visit in the last 12 months or has a visit in the next 30 days with authorizing provider or within the authorizing provider's specialty.  See \"Patient Info\" tab in inbasket, or \"Choose Columns\" in Meds & Orders section of the refill encounter.             Passed - Medication is active on med list       Passed - Patient is age 18 or older       Passed - No active pregnancy on record       Passed - No positive pregnancy test in last 12 months              Juarez Faarax  [facility]  "

## 2019-02-15 RX ORDER — ESCITALOPRAM OXALATE 10 MG/1
10 TABLET ORAL DAILY
Qty: 90 TABLET | Refills: 0 | Status: SHIPPED | OUTPATIENT
Start: 2019-02-15 | End: 2019-05-15

## 2019-05-08 ENCOUNTER — TRANSFERRED RECORDS (OUTPATIENT)
Dept: HEALTH INFORMATION MANAGEMENT | Facility: CLINIC | Age: 34
End: 2019-05-08

## 2019-05-08 ENCOUNTER — MEDICAL CORRESPONDENCE (OUTPATIENT)
Dept: HEALTH INFORMATION MANAGEMENT | Facility: CLINIC | Age: 34
End: 2019-05-08

## 2019-05-09 ENCOUNTER — DOCUMENTATION ONLY (OUTPATIENT)
Dept: LAB | Facility: CLINIC | Age: 34
End: 2019-05-09

## 2019-05-09 ENCOUNTER — MYC MEDICAL ADVICE (OUTPATIENT)
Dept: FAMILY MEDICINE | Facility: CLINIC | Age: 34
End: 2019-05-09

## 2019-05-09 DIAGNOSIS — Z13.1 ENCOUNTER FOR SCREENING EXAMINATION FOR IMPAIRED GLUCOSE REGULATION AND DIABETES MELLITUS: ICD-10-CM

## 2019-05-09 DIAGNOSIS — Z13.220 LIPID SCREENING: Primary | ICD-10-CM

## 2019-05-09 DIAGNOSIS — Z11.3 SCREENING EXAMINATION FOR VENEREAL DISEASE: Primary | ICD-10-CM

## 2019-05-09 NOTE — PROGRESS NOTES
Patient has a lab appointment on 5/18/2019. Per the lab schedule scrubbing protocol they are not due for anything. Please review chart and send orders or let patient know appointment is not needed.    Thank you,  Ursula Segundo

## 2019-05-15 DIAGNOSIS — F41.1 GENERALIZED ANXIETY DISORDER: ICD-10-CM

## 2019-05-15 NOTE — TELEPHONE ENCOUNTER
"Requested Prescriptions   Pending Prescriptions Disp Refills     escitalopram (LEXAPRO) 10 MG tablet [Pharmacy Med Name: Escitalopram Oxalate Oral Tablet 10 MG]      Last Written Prescription Date:  2/15/19  Last Fill Quantity: 90,  # refills: 0   Last Office Visit with [facility], [facility] or [facility] prescribing provider:  8/29/18   Future Office Visit:    Next 5 appointments (look out 90 days)    May 22, 2019 11:40 AM CDT  PHYSICAL with Krista Landaverde MD  [facility] [address] [place] [id number] [phone]          90 tablet 0     Sig: Take 1 tablet (10 mg) by mouth daily       SSRIs Protocol Passed - 5/15/2019  4:54 PM        Passed - Recent (12 mo) or future (30 days) visit within the authorizing provider's specialty     Patient had office visit in the last 12 months or has a visit in the next 30 days with authorizing provider or within the authorizing provider's specialty.  See \"Patient Info\" tab in inbasket, or \"Choose Columns\" in Meds & Orders section of the refill encounter.              Passed - Medication is active on med list        Passed - Patient is age 18 or older        Passed - No active pregnancy on record        Passed - No positive pregnancy test in last 12 months              Juarez Faarax  [facility]  "

## 2019-05-16 RX ORDER — ESCITALOPRAM OXALATE 10 MG/1
10 TABLET ORAL DAILY
Qty: 90 TABLET | Refills: 0 | Status: SHIPPED | OUTPATIENT
Start: 2019-05-16 | End: 2019-08-12

## 2019-05-16 NOTE — TELEPHONE ENCOUNTER
Prescription approved per [facility] Refill Protocol.        Javier Go RN, BSN, PHN

## 2019-05-18 DIAGNOSIS — Z11.3 SCREENING EXAMINATION FOR VENEREAL DISEASE: ICD-10-CM

## 2019-05-18 DIAGNOSIS — Z13.220 LIPID SCREENING: ICD-10-CM

## 2019-05-18 DIAGNOSIS — Z13.1 ENCOUNTER FOR SCREENING EXAMINATION FOR IMPAIRED GLUCOSE REGULATION AND DIABETES MELLITUS: ICD-10-CM

## 2019-05-18 PROCEDURE — 82947 ASSAY GLUCOSE BLOOD QUANT: CPT | Performed by: PREVENTIVE MEDICINE

## 2019-05-18 PROCEDURE — 87340 HEPATITIS B SURFACE AG IA: CPT | Performed by: OBSTETRICS & GYNECOLOGY

## 2019-05-18 PROCEDURE — 80061 LIPID PANEL: CPT | Performed by: PREVENTIVE MEDICINE

## 2019-05-18 PROCEDURE — 87389 HIV-1 AG W/HIV-1&-2 AB AG IA: CPT | Performed by: OBSTETRICS & GYNECOLOGY

## 2019-05-18 PROCEDURE — 36415 COLL VENOUS BLD VENIPUNCTURE: CPT | Performed by: OBSTETRICS & GYNECOLOGY

## 2019-05-18 PROCEDURE — 86780 TREPONEMA PALLIDUM: CPT | Performed by: OBSTETRICS & GYNECOLOGY

## 2019-05-18 PROCEDURE — 86803 HEPATITIS C AB TEST: CPT | Performed by: OBSTETRICS & GYNECOLOGY

## 2019-05-19 LAB — T PALLIDUM AB SER QL: NONREACTIVE

## 2019-05-20 LAB
CHOLEST SERPL-MCNC: 160 MG/DL
GLUCOSE SERPL-MCNC: 83 MG/DL (ref 70–99)
HBV SURFACE AG SERPL QL IA: NONREACTIVE
HCV AB SERPL QL IA: NONREACTIVE
HDLC SERPL-MCNC: 53 MG/DL
HIV 1+2 AB+HIV1 P24 AG SERPL QL IA: NONREACTIVE
LDLC SERPL CALC-MCNC: 82 MG/DL
NONHDLC SERPL-MCNC: 107 MG/DL
TRIGL SERPL-MCNC: 123 MG/DL

## 2019-05-20 ASSESSMENT — ENCOUNTER SYMPTOMS
CHILLS: 0
NERVOUS/ANXIOUS: 0
HEADACHES: 0
ARTHRALGIAS: 0
MYALGIAS: 0
CONSTIPATION: 0
HEARTBURN: 0
EYE PAIN: 0
DIARRHEA: 0
FREQUENCY: 0
BREAST MASS: 0
WEAKNESS: 0
JOINT SWELLING: 0
HEMATURIA: 0
COUGH: 0
SORE THROAT: 0
DYSURIA: 0
NAUSEA: 0
FEVER: 0
HEMATOCHEZIA: 0
SHORTNESS OF BREATH: 0
PARESTHESIAS: 0
ABDOMINAL PAIN: 0
PALPITATIONS: 0
DIZZINESS: 0

## 2019-05-21 NOTE — RESULT ENCOUNTER NOTE
Clarissa, your test results were within normal limits.  Cholesterol is excellent  Glucose is normal, you do not have diabetes.     Please do not hesitate to call us at [phone] if you have any questions or concerns.    Thank you,    Krista Landaverde MD MPH

## 2019-05-22 ENCOUNTER — OFFICE VISIT (OUTPATIENT)
Dept: FAMILY MEDICINE | Facility: CLINIC | Age: 34
End: 2019-05-22
Payer: COMMERCIAL

## 2019-05-22 VITALS
HEIGHT: 67 IN | DIASTOLIC BLOOD PRESSURE: 84 MMHG | SYSTOLIC BLOOD PRESSURE: 121 MMHG | OXYGEN SATURATION: 97 % | HEART RATE: 72 BPM | WEIGHT: 237 LBS | TEMPERATURE: 98.1 F | BODY MASS INDEX: 37.2 KG/M2

## 2019-05-22 DIAGNOSIS — Z00.00 ROUTINE GENERAL MEDICAL EXAMINATION AT A HEALTH CARE FACILITY: Primary | ICD-10-CM

## 2019-05-22 DIAGNOSIS — F41.1 GENERALIZED ANXIETY DISORDER: ICD-10-CM

## 2019-05-22 DIAGNOSIS — K21.9 GERD WITHOUT ESOPHAGITIS: ICD-10-CM

## 2019-05-22 DIAGNOSIS — Z98.84 BARIATRIC SURGERY STATUS: ICD-10-CM

## 2019-05-22 DIAGNOSIS — E66.01 MORBID OBESITY (H): ICD-10-CM

## 2019-05-22 PROCEDURE — 99395 PREV VISIT EST AGE 18-39: CPT | Performed by: PREVENTIVE MEDICINE

## 2019-05-22 ASSESSMENT — MIFFLIN-ST. JEOR: SCORE: 1807.65

## 2019-05-22 ASSESSMENT — PAIN SCALES - GENERAL: PAINLEVEL: NO PAIN (0)

## 2019-05-22 NOTE — PATIENT INSTRUCTIONS
At [facility], we strive to deliver an exceptional experience to you, every time we see you.  If you receive a survey in the mail, please send us back your thoughts. We really do value your feedback.    Your care team:                            Family Medicine Internal Medicine   MD Ankur Hogue MD Shantel Branch-Fleming, MD Katya Georgiev PA-C Megan Hill, APRN MELANIE Dykes MD Pediatrics   Rusty Donovan, DEANDRE Elise, MD Gabriela Coles APRN CNP   MD Lynette Howard MD Deborah Mielke, MD Gela Watson, APRN [facility]      Clinic hours: Monday - Thursday 7 am-7 pm; Fridays 7 am-5 pm.   Urgent care: Monday - Friday 11 am-9 pm; Saturday and Sunday 9 am-5 pm.  Pharmacy : Monday -Thursday 8 am-8 pm; Friday 8 am-6 pm; Saturday and Sunday 9 am-5 pm.     Clinic: [phone]   Pharmacy: [phone]

## 2019-05-22 NOTE — PROGRESS NOTES
Answers for HPI/ROS submitted by the patient on 5/20/2019   Annual Exam:  Frequency of exercise:: 6-7 days/week  Getting at least 3 servings of Calcium per day:: Yes  Diet:: Regular (no restrictions)  Taking medications regularly:: Yes  Medication side effects:: None  Bi-annual eye exam:: NO  Dental care twice a year:: Yes  Sleep apnea or symptoms of sleep apnea:: None  abdominal pain: No  Blood in stool: No  Blood in urine: No  chest pain: No  chills: No  congestion: No  constipation: No  cough: No  diarrhea: No  dizziness: No  ear pain: No  eye pain: No  nervous/anxious: No  fever: No  frequency: No  genital sores: No  headaches: No  hearing loss: No  heartburn: No  arthralgias: No  joint swelling: No  peripheral edema: No  mood changes: No  myalgias: No  nausea: No  dysuria: No  palpitations: No  Skin sensation changes: No  sore throat: No  urgency: No  rash: No  shortness of breath: No  visual disturbance: No  weakness: No  pelvic pain: No  vaginal bleeding: No  vaginal discharge: No  tenderness: No  breast mass: No  breast discharge: No  Additional concerns today:: No  Duration of exercise:: 30-45 minutes     SUBJECTIVE:   CC: Clarissa Pedroza is an 33 year old woman who presents for preventive health visit.     Healthy Habits:     Getting at least 3 servings of Calcium per day:  Yes    Bi-annual eye exam:  NO    Dental care twice a year:  Yes    Sleep apnea or symptoms of sleep apnea:  None    Diet:  Regular (no restrictions)    Frequency of exercise:  6-7 days/week    Duration of exercise:  30-45 minutes    Taking medications regularly:  Yes    Medication side effects:  None    PHQ-2 Total Score: 0    Additional concerns today:  No        Mood stable  No thoughts of self harm  Good sleep  No substance use    Has been buying Omeprazole over the counter, would like script for it     Anxiety Follow-Up    How are you doing with your anxiety since your last visit? No change    Are you having other symptoms  that might be associated with anxiety? No    Have you had a significant life event? OTHER: Undergoing IVF     Are you feeling depressed? No    Do you have any concerns with your use of alcohol or other drugs? No    Social History     Tobacco Use     Smoking status: Never Smoker     Smokeless tobacco: Never Used   Substance Use Topics     Alcohol use: Yes     Comment: rare     Drug use: No     MONTEZ-7 SCORE 1/31/2018   Total Score 0     No flowsheet data found.  No flowsheet data found.  No flowsheet data found.      Today's PHQ-2 Score:   PHQ-2 ( 1999 Pfizer) 5/22/2019   Q1: Little interest or pleasure in doing things 0   Q2: Feeling down, depressed or hopeless 0   PHQ-2 Score 0   Q1: Little interest or pleasure in doing things -   Q2: Feeling down, depressed or hopeless -   PHQ-2 Score -       Abuse: Current or Past(Physical, Sexual or Emotional)- No  Do you feel safe in your environment? Yes    Social History     Tobacco Use     Smoking status: Never Smoker     Smokeless tobacco: Never Used   Substance Use Topics     Alcohol use: Yes     Comment: rare     If you drink alcohol do you typically have >3 drinks per day or >7 drinks per week? No    Alcohol Use 5/22/2019   Prescreen: >3 drinks/day or >7 drinks/week? -   Prescreen: >3 drinks/day or >7 drinks/week? No     Reviewed orders with patient.  Reviewed health maintenance and updated orders accordingly - Yes  Lab work is in process  Labs reviewed in EPIC  BP Readings from Last 3 Encounters:   05/22/19 121/84   01/26/19 113/74   09/04/18 122/67    Wt Readings from Last 3 Encounters:   05/22/19 107.5 kg (237 lb)   01/26/19 106.1 kg (234 lb)   09/04/18 104.1 kg (229 lb 9.6 oz)                  Patient Active Problem List   Diagnosis     CARDIOVASCULAR SCREENING; LDL GOAL LESS THAN 160     MONTEZ (generalized anxiety disorder)     History of infertility     Bariatric surgery status     Obesity (BMI 30-39.9)     GERD without esophagitis     Postsurgical malabsorption      Symptomatic cholelithiasis     Obesity (BMI 35.0-39.9) with comorbidity (H)     Past Surgical History:   Procedure Laterality Date     ENT SURGERY      wisdom teeth     LAPAROSCOPIC CHOLECYSTECTOMY N/A 9/4/2018    Procedure: LAPAROSCOPIC CHOLECYSTECTOMY;  LAPAROSCOPIC CHOLECYSTECTOMY ;  Surgeon: Kenneth Jo MD;  [facility]:  [facility]     LAPAROSCOPIC GASTRIC SLEEVE N/A 8/7/2017    Procedure: LAPAROSCOPIC GASTRIC SLEEVE;  LAPAROSCOPIC GASTRIC SLEEVE;  Surgeon: Sloan Burnette MD;  Location:  [facility]     ORTHOPEDIC SURGERY  1991    Left elbow       Social History     Tobacco Use     Smoking status: Never Smoker     Smokeless tobacco: Never Used   Substance Use Topics     Alcohol use: Yes     Comment: rare     Family History   Problem Relation Age of Onset     Depression/Anxiety Mother         Anxiety     Thyroid Disease Mother         Hypothyroid     Anxiety Disorder Mother      Thyroid Disease Mother         hypothyroid     Obesity Mother      Other Cancer Maternal Grandmother         Hodgkins Lymphoma     Obesity Maternal Grandmother      Prostate Cancer Paternal Grandfather      Anxiety Disorder Brother      Depression/Anxiety Sister         Anxiety     Anxiety Disorder Sister          Current Outpatient Medications   Medication Sig Dispense Refill     calcium citrate-vitamin D (CITRACAL) 315-250 MG-UNIT TABS per tablet Take 2 tablets by mouth 2 times daily        Cyanocobalamin (B-12-SL SL) Place 1,000 mcg under the tongue daily        escitalopram (LEXAPRO) 10 MG tablet Take 1 tablet (10 mg) by mouth daily 90 tablet 0     Iron-Vitamin C (VITRON-C PO) Take 1 tablet by mouth every morning       multivitamin  peds with iron (FLINTSTONES COMPLETE) 60 MG chewable tablet Take 2 chew tab by mouth every morning       omeprazole (PRILOSEC) 20 MG DR capsule Take 1 capsule (20 mg) by mouth daily 90 capsule 3     OMEPRAZOLE PO Take 20 mg by mouth daily       VITAMIN D, CHOLECALCIFEROL, PO Take 2,000 Units by mouth daily        Allergies   Allergen Reactions     No Clinical Screening - See Comments Anaphylaxis     Mice droppings       Mammogram not appropriate for this patient based on age.    Pertinent mammograms are reviewed under the imaging tab.  History of abnormal Pap smear: NO - age 30-65 PAP every 5 years with negative HPV co-testing recommended  PAP / HPV Latest Ref Rng & Units 1/31/2018 3/13/2015   PAP - NIL NIL   HPV 16 DNA NEG:Negative Negative Negative   HPV 18 DNA NEG:Negative Negative Negative   OTHER HR HPV NEG:Negative Negative Negative     Reviewed and updated as needed this visit by clinical staff  Tobacco  Allergies  Meds  Problems  Med Hx  Surg Hx  Fam Hx         Reviewed and updated as needed this visit by Provider  Tobacco  Allergies  Meds  Problems  Med Hx  Surg Hx  Fam Hx        Past Medical History:   Diagnosis Date     Gastroesophageal reflux disease      Obese      Right upper quadrant pain       Past Surgical History:   Procedure Laterality Date     ENT SURGERY      wisdom teeth     LAPAROSCOPIC CHOLECYSTECTOMY N/A 9/4/2018    Procedure: LAPAROSCOPIC CHOLECYSTECTOMY;  LAPAROSCOPIC CHOLECYSTECTOMY ;  Surgeon: Kenneth Jo MD;  Location: [facility]     LAPAROSCOPIC GASTRIC SLEEVE N/A 8/7/2017    Procedure: LAPAROSCOPIC GASTRIC SLEEVE;  LAPAROSCOPIC GASTRIC SLEEVE;  Surgeon: Sloan Burnette MD;  Location:  [facility]     ORTHOPEDIC SURGERY  1991    Left elbow       Review of Systems  CONSTITUTIONAL: NEGATIVE for fever, chills, change in weight  INTEGUMENTARU/SKIN: NEGATIVE for worrisome rashes, moles or lesions  EYES: NEGATIVE for vision changes or irritation  ENT: NEGATIVE for ear, mouth and throat problems  RESP: NEGATIVE for significant cough or SOB  BREAST: NEGATIVE for masses, tenderness or discharge  CV: NEGATIVE for chest pain, palpitations or peripheral edema  GI: NEGATIVE for nausea, abdominal pain, heartburn, or change in bowel habits  : NEGATIVE for unusual urinary or vaginal  "symptoms. Periods are regular.  MUSCULOSKELETAL: NEGATIVE for significant arthralgias or myalgia  NEURO: NEGATIVE for weakness, dizziness or paresthesias  ENDOCRINE: NEGATIVE for temperature intolerance, skin/hair changes  HEME/ALLERGY/IMMUNE: NEGATIVE for bleeding problems  PSYCHIATRIC: NEGATIVE for changes in mood or affect     OBJECTIVE:   /84   Pulse 72   Temp 98.1  F (36.7  C) (Oral)   Ht 1.702 m (5' 7\")   Wt 107.5 kg (237 lb)   LMP 05/19/2019 (Exact Date)   SpO2 97%   Breastfeeding? No   BMI 37.12 kg/m    Physical Exam  GENERAL: healthy, alert and no distress  EYES: Eyes grossly normal to inspection, PERRL and conjunctivae and sclerae normal  HENT: ear canals and TM's normal, nose and mouth without ulcers or lesions  NECK: no adenopathy, no asymmetry, masses  RESP: lungs clear to auscultation - no rales, rhonchi or wheezes  BREAST: normal without masses, tenderness or nipple discharge and no palpable axillary masses or adenopathy  CV: regular rate and rhythm, normal S1 S2, no S3 or S4, no murmur, click or rub, no peripheral edema and peripheral pulses strong  ABDOMEN: soft, nontender, no rebound or guarding   MS: no gross musculoskeletal defects noted, no edema  SKIN: no suspicious lesions or rashes  NEURO: Normal strength and tone, mentation intact and speech normal  PSYCH: mentation appears normal, affect normal/bright  LYMPH: no cervical, supraclavicular, axillary,  adenopathy    Diagnostic Test Results:  Labs reviewed in Epic  Results for orders placed or performed in visit on 05/18/19   **HIV Antigen Antibody Combo FUTURE anytime   Result Value Ref Range    HIV Antigen Antibody Combo Nonreactive NR^Nonreactive       Treponema Abs w Reflex to RPR and Titer   Result Value Ref Range    Treponema Antibodies Nonreactive NR^Nonreactive   Hepatitis C antibody   Result Value Ref Range    Hepatitis C Antibody Nonreactive NR^Nonreactive   Hepatitis B surface antigen   Result Value Ref Range    Hep B " "Surface Agn Nonreactive NR^Nonreactive   Glucose   Result Value Ref Range    Glucose 83 70 - 99 mg/dL   Lipid panel reflex to direct LDL Fasting   Result Value Ref Range    Cholesterol 160 <200 mg/dL    Triglycerides 123 <150 mg/dL    HDL Cholesterol 53 >49 mg/dL    LDL Cholesterol Calculated 82 <100 mg/dL    Non HDL Cholesterol 107 <130 mg/dL       ASSESSMENT/PLAN:   Clarissa was seen today for physical.    Diagnoses and all orders for this visit:    Routine general medical examination at a health care facility  -labs done previously, normal  -Pap smear up to date     Morbid obesity (H)  -weight stable  -regular exercise     Generalized anxiety disorder  -stable on escitalopram   -OK to get refills for the full year     Bariatric surgery status  -     omeprazole (PRILOSEC) 20 MG DR capsule; Take 1 capsule (20 mg) by mouth daily    GERD without esophagitis  -     omeprazole (PRILOSEC) 20 MG DR capsule; Take 1 capsule (20 mg) by mouth daily  -Has been needing to take daily  -will try and taper off  -understands risk of long term use including bone loss         COUNSELING:  Reviewed preventive health counseling, as reflected in patient instructions       Regular exercise       Healthy diet/nutrition       Vision screening       Safe sex practices/STD prevention       HIV screeninx in teen years, 1x in adult years, and at intervals if high risk    Estimated body mass index is 37.12 kg/m  as calculated from the following:    Height as of this encounter: 1.702 m (5' 7\").    Weight as of this encounter: 107.5 kg (237 lb).    Weight management plan: Discussed healthy diet and exercise guidelines     reports that she has never smoked. She has never used smokeless tobacco.      Counseling Resources:  ATP IV Guidelines  Pooled Cohorts Equation Calculator  Breast Cancer Risk Calculator  FRAX Risk Assessment  ICSI Preventive Guidelines  Dietary Guidelines for Americans, 2010  USDA's MyPlate  ASA Prophylaxis  Lung CA " Screening    Krista Landaverde MD MPH    [facility]

## 2019-06-06 ENCOUNTER — TRANSFERRED RECORDS (OUTPATIENT)
Dept: HEALTH INFORMATION MANAGEMENT | Facility: CLINIC | Age: 34
End: 2019-06-06

## 2019-06-28 ENCOUNTER — NURSE TRIAGE (OUTPATIENT)
Dept: NURSING | Facility: CLINIC | Age: 34
End: 2019-06-28

## 2019-06-28 NOTE — TELEPHONE ENCOUNTER
Pt calls in with main request of making an appointment   Transferred from scheduling     Pt is client of a Fertility clinic ( [facility] )  And in process of miscarriage     Minimal blood - still just wearing liner - not pads  Fertility clinic aware - would like her to make an appointment for possible D and C    Pt transferred back to scheduling to make an OB appointment     Protocol and care advice reviewed  Caller states understanding of the recommended disposition  Advised to call back if further questions or concerns    Nick Victoria , RN / [facility]      Reason for Disposition    MILD vaginal bleeding (i.e., less than 1 pad / hour; less than patient's usual menstrual bleeding; not just spotting)    Additional Information    Negative: Shock suspected (e.g., cold/pale/clammy skin, too weak to stand, low BP, rapid pulse)    Negative: Difficult to awaken or acting confused (e.g., disoriented, slurred speech)    Negative: Passed out (i.e., lost consciousness, collapsed and was not responding)    Negative: Sounds like a life-threatening emergency to the triager    Protocols used: PREGNANCY - VAGINAL BLEEDING LESS THAN 20 WEEKS [facility]-

## 2019-07-01 ENCOUNTER — TRANSFERRED RECORDS (OUTPATIENT)
Dept: HEALTH INFORMATION MANAGEMENT | Facility: CLINIC | Age: 34
End: 2019-07-01

## 2019-07-02 ENCOUNTER — ANCILLARY PROCEDURE (OUTPATIENT)
Dept: ULTRASOUND IMAGING | Facility: CLINIC | Age: 34
End: 2019-07-02
Attending: OBSTETRICS & GYNECOLOGY
Payer: COMMERCIAL

## 2019-07-02 ENCOUNTER — OFFICE VISIT (OUTPATIENT)
Dept: OBGYN | Facility: CLINIC | Age: 34
End: 2019-07-02
Payer: COMMERCIAL

## 2019-07-02 ENCOUNTER — MYC MEDICAL ADVICE (OUTPATIENT)
Dept: OBGYN | Facility: CLINIC | Age: 34
End: 2019-07-02

## 2019-07-02 VITALS
WEIGHT: 239.6 LBS | BODY MASS INDEX: 37.53 KG/M2 | OXYGEN SATURATION: 100 % | SYSTOLIC BLOOD PRESSURE: 117 MMHG | DIASTOLIC BLOOD PRESSURE: 79 MMHG | HEART RATE: 77 BPM

## 2019-07-02 DIAGNOSIS — Z31.83 IN VITRO FERTILIZATION: ICD-10-CM

## 2019-07-02 DIAGNOSIS — O03.9 MISCARRIAGE: Primary | ICD-10-CM

## 2019-07-02 PROCEDURE — 99203 OFFICE O/P NEW LOW 30 MIN: CPT | Performed by: OBSTETRICS & GYNECOLOGY

## 2019-07-02 PROCEDURE — 76801 OB US < 14 WKS SINGLE FETUS: CPT | Performed by: RADIOLOGY

## 2019-07-02 NOTE — PATIENT INSTRUCTIONS
If you have any questions regarding your visit, Please contact your care team.  [facility]: [phone]  [facility] CLINIC HOURS TELEPHONE NUMBER   Cephas Agbeh, M.D. Lisa -       Teresa Fulton         Monday-[place]    8:00a.m-4:45 p.m    Tuesday--[place]     8:00a.m-4:45 p.m.    Thursday-[place]    8:00a.m-4:45 p.m.    Friday-[place]    8:00a.m-4:45 p.m    [facility]   [address] [place] [address]   [phone]-Ask for [facility]   Fax [phone]   Extwkov-[phone]     [facility]   [address] [place] [address] [place] [address]   [phone]    [facility]  [address] [facility] [address]  [phone]  Hznaowj-[phone]   Urgent Care locations:    [facility] Monday-Friday  5 pm - 9 pm  Saturday and Sunday   9 am - 5 pm   Monday-Friday   5 pm - 9 pm  Saturday and Sunday  9 am - 5 pm    [phone]   If you need a medication refill, please contact your pharmacy. Please allow 3 business days for your refill to be completed.  As always, Thank you for trusting us with your healthcare needs!

## 2019-07-02 NOTE — PROGRESS NOTES
Clarissa is a 33 year old  referred here by [facility] for consultation regarding abnormal pregnancy.Here with spouse.  She had IVF transfer  On 19., Spotting on 19, Hcg on  was 144, 3 days of heavy menses on 19.  Hcg on  was 60,  was 161 and 223 on 19.  She denies any abdominal pains.  Has not had any ultrasounds..    ROS: Ten point review of systems was reviewed and negative except the above.    Gyne: - abn pap (last pap ), - STD's    Past Medical History:   Diagnosis Date     Gastroesophageal reflux disease      Obese      Right upper quadrant pain      Past Surgical History:   Procedure Laterality Date     ENT SURGERY      wisdom teeth     LAPAROSCOPIC CHOLECYSTECTOMY N/A 2018    Procedure: LAPAROSCOPIC CHOLECYSTECTOMY;  LAPAROSCOPIC CHOLECYSTECTOMY ;  Surgeon: Kenneth Jo MD;  [facility]     LAPAROSCOPIC GASTRIC SLEEVE N/A 2017    Procedure: LAPAROSCOPIC GASTRIC SLEEVE;  LAPAROSCOPIC GASTRIC SLEEVE;  Surgeon: Sloan Burnette MD;  Location:  [facility]     ORTHOPEDIC SURGERY      Left elbow     Patient Active Problem List   Diagnosis     CARDIOVASCULAR SCREENING; LDL GOAL LESS THAN 160     MONTEZ (generalized anxiety disorder)     History of infertility     Bariatric surgery status     Obesity (BMI 30-39.9)     GERD without esophagitis     Postsurgical malabsorption     Symptomatic cholelithiasis     Obesity (BMI 35.0-39.9) with comorbidity (H)     In vitro fertilization       ALL/Meds: Her medication and allergy histories were reviewed and are documented in their appropriate chart areas.    SH: - tob, - EtOH,     FH: Her family history was reviewed and documented in its appropriate chart area.    PE: /79   Pulse 77   Wt 108.7 kg (239 lb 9.6 oz)   LMP 2019 (Exact Date)   SpO2 100%   Breastfeeding? No   BMI 37.53 kg/m    Body mass index is 37.53 kg/m .    General Appearance:  healthy, alert, active, no  distress  HEENT: NCAT  Abdomen: Soft, nontender.  Normal bowel sounds.  No masses  Pelvic:       - Ext: NEFG,        - Urethra: no lesions, no masses, no hypermobility       - Urethral Meatus: normal appearance,        - Bladder: no tenderness, no masses       - Vagina: pink, moist, normal rugae, no lesions, minimal olld bloody discharge       - Cervix: closed. no lesions, nulliparous       - Uterus: small sized, AV/RV/Midplane, mobile, normal contour       - Adnexa: no masses, no tenderness       - Rectal: deferred, normal tone, negative guaic    EXAMINATION: US OB < 14 WEEKS SINGLE, 7/2/2019 2:41 PM      COMPARISON: Pelvic ultrasound 7/6/2016     HISTORY: In vitro fertilization transfer on 6/6/2019. The patient has  spotting since 6/13/2019 and heavy bleeding for 3 days. Beta hCG was  144,  then 60, 161, 223.     TECHNIQUE: The pelvis was scanned in standard fashion with  transabdominal and transvaginal transducer(s).     FINDINGS:   No intrauterine pregnancy is visualized. The endometrial stripe is  thin measuring 4 mm in thickness.     The right ovary measures 2.5 x 2.1 x 1.6 cm the left ovary measures  2.4 x 1.7 x 1.7 cm. There is blood flow to both ovaries. There is no  free fluid in the pelvis. No adnexal mass.     Again visualized are nabothian cysts in particular, a complicated,  stable one in the posterior cervix.                                                                      IMPRESSION:      1.  No intrauterine pregnancy is visualized. Continued clinical  follow-up..     DREW BARRIENTOS MD      A/P      ICD-10-CM    1. Miscarriage O03.9 HCG quantitative pregnancy   2. In vitro fertilization Z31.83 US OB < 14 Weeks Single     HCG quantitative pregnancy     With her history, hcg levels and ultrasound results, most consistent with miscarriage. Ectopic is highly unlikely but cautious.  Reviewed that miscarriage occurs ~ 1 in 5 pregnancy events and that there was no direct event or prevention  that the  patient could have avoided or performed.  Discussed that there are many etiologies for miscarriage, the most common being a genetic anomaly.  Reviewed that risk of miscarriage for next pregnancy is not elevated by the current event.    Reviewed options of expectant management, D&C, and medical therapy (cytotec).  Reviewed risks and benefits of all options.  All questions answered.  Patient is opting for expectant management with weekly hcg till zero..      30 minutes was spent face to face with the patient today discussing her history, diagnosis, and follow-up plan as noted above.  Over 50% of the visit was spent in counseling and coordination of care.    CEPHAS AGBEH, MD.      CEPHAS AGBEH, MD.

## 2019-07-08 DIAGNOSIS — Z31.83 IN VITRO FERTILIZATION: ICD-10-CM

## 2019-07-08 DIAGNOSIS — O03.9 MISCARRIAGE: ICD-10-CM

## 2019-07-08 LAB — B-HCG SERPL-ACNC: 788 IU/L (ref 0–5)

## 2019-07-08 PROCEDURE — 36415 COLL VENOUS BLD VENIPUNCTURE: CPT | Performed by: OBSTETRICS & GYNECOLOGY

## 2019-07-08 PROCEDURE — 84702 CHORIONIC GONADOTROPIN TEST: CPT | Performed by: OBSTETRICS & GYNECOLOGY

## 2019-07-09 ENCOUNTER — INFUSION THERAPY VISIT (OUTPATIENT)
Dept: INFUSION THERAPY | Facility: CLINIC | Age: 34
End: 2019-07-09
Payer: COMMERCIAL

## 2019-07-09 ENCOUNTER — OFFICE VISIT (OUTPATIENT)
Dept: OBGYN | Facility: CLINIC | Age: 34
End: 2019-07-09
Payer: COMMERCIAL

## 2019-07-09 ENCOUNTER — TELEPHONE (OUTPATIENT)
Dept: OBGYN | Facility: CLINIC | Age: 34
End: 2019-07-09

## 2019-07-09 ENCOUNTER — ANCILLARY PROCEDURE (OUTPATIENT)
Dept: ULTRASOUND IMAGING | Facility: CLINIC | Age: 34
End: 2019-07-09
Attending: OBSTETRICS & GYNECOLOGY
Payer: COMMERCIAL

## 2019-07-09 ENCOUNTER — MYC MEDICAL ADVICE (OUTPATIENT)
Dept: OBGYN | Facility: CLINIC | Age: 34
End: 2019-07-09

## 2019-07-09 VITALS
OXYGEN SATURATION: 100 % | HEART RATE: 74 BPM | HEIGHT: 67 IN | DIASTOLIC BLOOD PRESSURE: 88 MMHG | BODY MASS INDEX: 37.73 KG/M2 | WEIGHT: 240.4 LBS | SYSTOLIC BLOOD PRESSURE: 126 MMHG

## 2019-07-09 DIAGNOSIS — O03.9 MISCARRIAGE: ICD-10-CM

## 2019-07-09 DIAGNOSIS — O00.109 TUBAL PREGNANCY WITHOUT INTRAUTERINE PREGNANCY, UNSPECIFIED LATERALITY: Primary | ICD-10-CM

## 2019-07-09 DIAGNOSIS — O03.9 MISCARRIAGE: Primary | ICD-10-CM

## 2019-07-09 LAB
ALT SERPL W P-5'-P-CCNC: 26 U/L (ref 0–50)
AST SERPL W P-5'-P-CCNC: 24 U/L (ref 0–45)
B-HCG SERPL-ACNC: 979 IU/L (ref 0–5)
CREAT SERPL-MCNC: 0.71 MG/DL (ref 0.52–1.04)
ERYTHROCYTE [DISTWIDTH] IN BLOOD BY AUTOMATED COUNT: 11.4 % (ref 10–15)
GFR SERPL CREATININE-BSD FRML MDRD: >90 ML/MIN/{1.73_M2}
HCT VFR BLD AUTO: 41.3 % (ref 35–47)
HGB BLD-MCNC: 14.4 G/DL (ref 11.7–15.7)
MCH RBC QN AUTO: 32.6 PG (ref 26.5–33)
MCHC RBC AUTO-ENTMCNC: 34.9 G/DL (ref 31.5–36.5)
MCV RBC AUTO: 93 FL (ref 78–100)
PLATELET # BLD AUTO: 193 10E9/L (ref 150–450)
RBC # BLD AUTO: 4.42 10E12/L (ref 3.8–5.2)
WBC # BLD AUTO: 10.4 10E9/L (ref 4–11)

## 2019-07-09 PROCEDURE — 99207 ZZC NO CHARGE NURSE ONLY: CPT

## 2019-07-09 PROCEDURE — 86850 RBC ANTIBODY SCREEN: CPT | Performed by: PREVENTIVE MEDICINE

## 2019-07-09 PROCEDURE — 84702 CHORIONIC GONADOTROPIN TEST: CPT | Performed by: OBSTETRICS & GYNECOLOGY

## 2019-07-09 PROCEDURE — 96401 CHEMO ANTI-NEOPL SQ/IM: CPT | Performed by: PHYSICIAN ASSISTANT

## 2019-07-09 PROCEDURE — 76817 TRANSVAGINAL US OBSTETRIC: CPT | Performed by: RADIOLOGY

## 2019-07-09 PROCEDURE — 84450 TRANSFERASE (AST) (SGOT): CPT | Performed by: OBSTETRICS & GYNECOLOGY

## 2019-07-09 PROCEDURE — 86900 BLOOD TYPING SEROLOGIC ABO: CPT | Performed by: PREVENTIVE MEDICINE

## 2019-07-09 PROCEDURE — 76801 OB US < 14 WKS SINGLE FETUS: CPT | Mod: 59 | Performed by: RADIOLOGY

## 2019-07-09 PROCEDURE — 82565 ASSAY OF CREATININE: CPT | Performed by: OBSTETRICS & GYNECOLOGY

## 2019-07-09 PROCEDURE — 86901 BLOOD TYPING SEROLOGIC RH(D): CPT | Performed by: PREVENTIVE MEDICINE

## 2019-07-09 PROCEDURE — 99214 OFFICE O/P EST MOD 30 MIN: CPT | Performed by: OBSTETRICS & GYNECOLOGY

## 2019-07-09 PROCEDURE — 36415 COLL VENOUS BLD VENIPUNCTURE: CPT | Performed by: OBSTETRICS & GYNECOLOGY

## 2019-07-09 PROCEDURE — 84460 ALANINE AMINO (ALT) (SGPT): CPT | Performed by: OBSTETRICS & GYNECOLOGY

## 2019-07-09 PROCEDURE — 85027 COMPLETE CBC AUTOMATED: CPT | Performed by: OBSTETRICS & GYNECOLOGY

## 2019-07-09 RX ORDER — METHOTREXATE 25 MG/ML
113.5 INJECTION, SOLUTION INTRA-ARTERIAL; INTRAMUSCULAR; INTRATHECAL; INTRAVENOUS ONCE
Status: COMPLETED | OUTPATIENT
Start: 2019-07-09 | End: 2019-07-09

## 2019-07-09 RX ORDER — METHOTREXATE 25 MG/ML
50 INJECTION, SOLUTION INTRA-ARTERIAL; INTRAMUSCULAR; INTRATHECAL; INTRAVENOUS ONCE
Status: CANCELLED | OUTPATIENT
Start: 2019-07-09

## 2019-07-09 RX ORDER — METHOTREXATE 25 MG/ML
50 INJECTION, SOLUTION INTRA-ARTERIAL; INTRAMUSCULAR; INTRATHECAL; INTRAVENOUS ONCE
Status: DISCONTINUED | OUTPATIENT
Start: 2019-07-09 | End: 2019-07-17

## 2019-07-09 RX ADMIN — METHOTREXATE 113.5 MG: 25 INJECTION, SOLUTION INTRA-ARTERIAL; INTRAMUSCULAR; INTRATHECAL; INTRAVENOUS at 16:23

## 2019-07-09 ASSESSMENT — MIFFLIN-ST. JEOR: SCORE: 1822.63

## 2019-07-09 NOTE — PROGRESS NOTES
Clarissa is a 33 year old  referred here by self for consultation regarding Abnormal pregnancy  Still with some cramps. Hcg has risen to 788.        Results for orders placed or performed in visit on 19   US OB <14 Weeks w Transvaginal Single    Narrative    EXAMINATION: US OB <14 WEEKS WITH TRANSVAGINAL SINGLE, 2019 9:46  AM     COMPARISON: Pelvic ultrasound 2016    HISTORY: In vitro fertilization transfer on 2019. Rising hCG of  788 on 2018; previously 223. The patient is currently on oral  birth control pills.    TECHNIQUE: The pelvis was scanned in standard fashion with  transabdominal and transvaginal transducer(s).    FINDINGS:   No intrauterine pregnancy is visualized. The endometrial stripe is  thin, homogeneous, measuring 5 mm in thickness. There is no increased  vascularity of the endometrium.    There is no free fluid in the pelvis. No adnexal mass. The right ovary  measures 2.1 x 1.6 x 2.5 cm and the left ovary measures 2.4 x 1.5 x  2.3 cm. There is blood flow to both ovaries.    The complicated nabothian cyst in the posterior wall of the cervix is  again seen measuring 2.4 x 3.1 x 1.3 cm.      Impression    IMPRESSION:      1.  No intrauterine pregnancy is visualized. Continued clinical  follow-up and management..    These findings were discussed with Dr. Agbeh by by telephone on  2019 10:02 AM    DREW BARRIENTOS MD     .    ROS: Ten point review of systems was reviewed and negative except the above.    Gyne: - abn pap (last pap ), - STD's    Past Medical History:   Diagnosis Date     Gastroesophageal reflux disease      Obese      Right upper quadrant pain      Past Surgical History:   Procedure Laterality Date     ENT SURGERY      wisdom teeth     LAPAROSCOPIC CHOLECYSTECTOMY N/A 2018    Procedure: LAPAROSCOPIC CHOLECYSTECTOMY;  LAPAROSCOPIC CHOLECYSTECTOMY ;  Surgeon: Kenneth Jo MD;  [facility]: [facility]     LAPAROSCOPIC GASTRIC SLEEVE N/A 2017    Procedure:  "LAPAROSCOPIC GASTRIC SLEEVE;  LAPAROSCOPIC GASTRIC SLEEVE;  Surgeon: Sloan Burnette MD;  Location:  [facility]     ORTHOPEDIC SURGERY  1991    Left elbow     Patient Active Problem List   Diagnosis     CARDIOVASCULAR SCREENING; LDL GOAL LESS THAN 160     MONTEZ (generalized anxiety disorder)     History of infertility     Bariatric surgery status     Obesity (BMI 30-39.9)     GERD without esophagitis     Postsurgical malabsorption     Symptomatic cholelithiasis     Obesity (BMI 35.0-39.9) with comorbidity (H)     In vitro fertilization     Tubal ectopic pregnancy       ALL/Meds: Her medication and allergy histories were reviewed and are documented in their appropriate chart areas.    SH: - tob, - EtOH,     FH: Her family history was reviewed and documented in its appropriate chart area.    PE: /88   Pulse 74   Ht 1.702 m (5' 7\")   Wt 109 kg (240 lb 6.4 oz)   LMP 05/19/2019 (Exact Date)   SpO2 100%   Breastfeeding? No   BMI 37.65 kg/m    Body mass index is 37.65 kg/m .    General Appearance:  healthy, alert, active, no distress  HEENT: NCAT  Abdomen: Soft, nontender.  Normal bowel sounds.  No masses    A/P      ICD-10-CM    1. Tubal pregnancy without intrauterine pregnancy, unspecified laterality O00.109        I discussed labs and ultrasound findings with Her IVF provider , Dr. ADEN who recommends this is an ectopic pregnancy due to possible retrograde transfer pregnanyy into the tube.  She is to stop OCP use as of today.  Patient has agreed to proceed with Methotrexate Treatment.  Lab orders and Infusion orders placed.    25 minutes was spent face to face with the patient today discussing her history, diagnosis, and follow-up plan as noted above.  Over 50% of the visit was spent in counseling and coordination of care.    Total Visit Time: 30 minutes.    CEPHAS AGBEH, MD.   "

## 2019-07-09 NOTE — PROGRESS NOTES
Infusion Nursing Note:  Clarissa Pedorza presents today for Methotrexate.    Patient seen by provider today: No   present during visit today: Not Applicable.    Note: Pt denies c/o discomfort, see flow sheet for assessment.    Intravenous Access:  No Intravenous access/labs at this visit.    Treatment Conditions:  Not Applicable.      Post Infusion Assessment:  Patient tolerated injection without incident.  Site patent and intact, free from redness, edema or discomfort.  Access discontinued per protocol.       Discharge Plan:   Patient discharged in stable condition accompanied by: .  Departure Mode: Ambulatory.    Bharat Dan RN

## 2019-07-09 NOTE — PATIENT INSTRUCTIONS
If you have any questions regarding your visit, Please contact your care team.  [facility]: [phone]  [facility] CLINIC HOURS TELEPHONE NUMBER   Cephas Agbeh, M.D. Lisa -       Teresa Fulton         Monday-[place]    8:00a.m-4:45 p.m    Tuesday--[place]     8:00a.m-4:45 p.m.    Thursday-[place]    8:00a.m-4:45 p.m.    Friday-[place]    8:00a.m-4:45 p.m    [facility]   [address] [place] [address]   [phone] for [facility]   Fax [phone]   Qlybciy-[phone]     [facility] Labor and Delivery   [address] [place] [address] [place] [address]   [phone]    [facility]  [address] [facility] [address]  [phone]  Ncyutin-[phone]   Urgent Care locations:    [facility] Monday-Friday  5 pm - 9 pm  Saturday and Sunday   9 am - 5 pm   Monday-Friday   5 pm - 9 pm  Saturday and Sunday  9 am - 5 pm    [phone]   If you need a medication refill, please contact your pharmacy. Please allow 3 business days for your refill to be completed.  As always, Thank you for trusting us with your healthcare needs!

## 2019-07-09 NOTE — TELEPHONE ENCOUNTER
Patient is all set up for labs and Methotrexate injection for Ectopic Pregnancy with [facility] in [place]. Dr. Agbeh aware and patient aware of situation and appointment. Please see patient's OV from today 7/9/2019.     Bethany Jaffe RN on 7/9/2019 at 12:00 PM

## 2019-07-09 NOTE — PROGRESS NOTES
Charting Correction:     Lab called nurse asking for ABO/Rh type to be added to order set, duplicate orders were in patient's chart but was missing the asked for order. RN obtained verbal order from provider but asked MA to assist with showing her how to add order to current order list. [facility] showed RN on the computer she was logged in to and signed the order in error. This notation is a correction as RN took the verbal order from provider who was standing in same room at time of order placed on the [facility]'s computer in which the order was submitted and signed on. Error was reported to PCA operations and PCA clinical.    Bethany Jaffe, RN on 7/9/2019 at 3:48 PM

## 2019-07-10 ENCOUNTER — MYC MEDICAL ADVICE (OUTPATIENT)
Dept: OBGYN | Facility: CLINIC | Age: 34
End: 2019-07-10

## 2019-07-10 LAB
ABO + RH BLD: NORMAL
ABO + RH BLD: NORMAL
BLD GP AB SCN SERPL QL: NORMAL
BLOOD BANK CMNT PATIENT-IMP: NORMAL
SPECIMEN EXP DATE BLD: NORMAL

## 2019-07-10 NOTE — TELEPHONE ENCOUNTER
RN spoke with Dr. Agbeh and got some clarification for repeat HCG quant labs. Asked to cancel patient's scheduled Monday 7/15 lab, as it was not necessary. However, he wants to scheduled labs for this Friday 7/12/2019 and next Tuesday 7/16/2019 to have HCG quants repeated. These will be repeated weekly until that HCG quant is down to ZERO and patient should abstain from intercourse until that number is zero as well.     Unable to reach patient via phone. Left message via [employer] with this information and to call clinic with any questions.    Bethany Jaffe, RN on 7/10/2019 at 9:42 AM

## 2019-07-10 NOTE — TELEPHONE ENCOUNTER
Agbeh, Cephas Mawuena, MD  You 26 minutes ago (3:39 PM)      Yes, you need to be off the birth control.   Dr. Fan is aware.

## 2019-07-10 NOTE — TELEPHONE ENCOUNTER
Agbeh, Cephas Mawuena, MD  You 2 minutes ago (9:18 AM)      No intercourse till hcg is tico to zero.   Follow up for lab work os scheduled.   CEPHAS AGBEH, MD.

## 2019-07-10 NOTE — TELEPHONE ENCOUNTER
Unable to reach patient via phone. Left message to call clinic back if she had additional questions after reading her [employer] messages.    Bethany Jaffe RN on 7/10/2019 at 9:52 AM

## 2019-07-12 DIAGNOSIS — O03.9 MISCARRIAGE: ICD-10-CM

## 2019-07-12 DIAGNOSIS — Z31.83 IN VITRO FERTILIZATION: ICD-10-CM

## 2019-07-12 LAB — B-HCG SERPL-ACNC: 1118 IU/L (ref 0–5)

## 2019-07-12 PROCEDURE — 84702 CHORIONIC GONADOTROPIN TEST: CPT | Performed by: OBSTETRICS & GYNECOLOGY

## 2019-07-12 PROCEDURE — 36415 COLL VENOUS BLD VENIPUNCTURE: CPT | Performed by: OBSTETRICS & GYNECOLOGY

## 2019-07-16 DIAGNOSIS — O03.9 MISCARRIAGE: ICD-10-CM

## 2019-07-16 DIAGNOSIS — Z31.83 IN VITRO FERTILIZATION: ICD-10-CM

## 2019-07-16 LAB — B-HCG SERPL-ACNC: 1117 IU/L (ref 0–5)

## 2019-07-16 PROCEDURE — 84702 CHORIONIC GONADOTROPIN TEST: CPT | Performed by: OBSTETRICS & GYNECOLOGY

## 2019-07-16 PROCEDURE — 36415 COLL VENOUS BLD VENIPUNCTURE: CPT | Performed by: OBSTETRICS & GYNECOLOGY

## 2019-07-17 ENCOUNTER — TELEPHONE (OUTPATIENT)
Dept: OBGYN | Facility: CLINIC | Age: 34
End: 2019-07-17

## 2019-07-17 ENCOUNTER — INFUSION THERAPY VISIT (OUTPATIENT)
Dept: INFUSION THERAPY | Facility: CLINIC | Age: 34
End: 2019-07-17
Payer: COMMERCIAL

## 2019-07-17 VITALS
TEMPERATURE: 97.5 F | HEART RATE: 73 BPM | OXYGEN SATURATION: 100 % | WEIGHT: 238.6 LBS | BODY MASS INDEX: 37.37 KG/M2 | RESPIRATION RATE: 16 BRPM | DIASTOLIC BLOOD PRESSURE: 75 MMHG | SYSTOLIC BLOOD PRESSURE: 114 MMHG

## 2019-07-17 VITALS
OXYGEN SATURATION: 99 % | HEIGHT: 67 IN | BODY MASS INDEX: 37.72 KG/M2 | WEIGHT: 240.3 LBS | HEART RATE: 79 BPM | DIASTOLIC BLOOD PRESSURE: 79 MMHG | TEMPERATURE: 98 F | SYSTOLIC BLOOD PRESSURE: 118 MMHG

## 2019-07-17 DIAGNOSIS — O00.109 TUBAL PREGNANCY WITHOUT INTRAUTERINE PREGNANCY, UNSPECIFIED LATERALITY: Primary | ICD-10-CM

## 2019-07-17 PROCEDURE — 99207 ZZC NO CHARGE LOS: CPT

## 2019-07-17 PROCEDURE — 96401 CHEMO ANTI-NEOPL SQ/IM: CPT | Performed by: NURSE PRACTITIONER

## 2019-07-17 RX ORDER — METHOTREXATE 25 MG/ML
50 INJECTION, SOLUTION INTRA-ARTERIAL; INTRAMUSCULAR; INTRATHECAL; INTRAVENOUS ONCE
Status: CANCELLED | OUTPATIENT
Start: 2019-07-17

## 2019-07-17 RX ORDER — METHOTREXATE 25 MG/ML
50 INJECTION INTRA-ARTERIAL; INTRAMUSCULAR; INTRATHECAL; INTRAVENOUS ONCE
Status: COMPLETED | OUTPATIENT
Start: 2019-07-17 | End: 2019-07-17

## 2019-07-17 RX ADMIN — METHOTREXATE 113 MG: 25 INJECTION INTRA-ARTERIAL; INTRAMUSCULAR; INTRATHECAL; INTRAVENOUS at 14:00

## 2019-07-17 ASSESSMENT — PAIN SCALES - GENERAL: PAINLEVEL: NO PAIN (0)

## 2019-07-17 NOTE — PROGRESS NOTES
Infusion Nursing Note:  Clarissa Pedroza presents today for Methotrexate injection.    Patient seen by provider today: No   present during visit today: Not Applicable.    Note:   Had some intermittent nausea and headaches after first dose.  However, overall, states she is doing well.    Intravenous Access:  No Intravenous access/labs at this visit.    Treatment Conditions:  Hcg.    Post Infusion Assessment:  Patient tolerated injection without incident.  Site free from redness, edema or discomfort.     Discharge Plan:     Patient discharged in stable condition accompanied by: .  Departure Mode: Ambulatory.    Rama Mcleod RN

## 2019-07-23 DIAGNOSIS — O00.109 TUBAL PREGNANCY WITHOUT INTRAUTERINE PREGNANCY, UNSPECIFIED LATERALITY: ICD-10-CM

## 2019-07-23 LAB — B-HCG SERPL-ACNC: 375 IU/L (ref 0–5)

## 2019-07-23 PROCEDURE — 36415 COLL VENOUS BLD VENIPUNCTURE: CPT | Performed by: OBSTETRICS & GYNECOLOGY

## 2019-07-23 PROCEDURE — 84702 CHORIONIC GONADOTROPIN TEST: CPT | Performed by: OBSTETRICS & GYNECOLOGY

## 2019-07-30 DIAGNOSIS — O00.109 TUBAL PREGNANCY WITHOUT INTRAUTERINE PREGNANCY, UNSPECIFIED LATERALITY: ICD-10-CM

## 2019-07-30 LAB — B-HCG SERPL-ACNC: 91 IU/L (ref 0–5)

## 2019-07-30 PROCEDURE — 84702 CHORIONIC GONADOTROPIN TEST: CPT | Performed by: OBSTETRICS & GYNECOLOGY

## 2019-07-30 PROCEDURE — 36415 COLL VENOUS BLD VENIPUNCTURE: CPT | Performed by: OBSTETRICS & GYNECOLOGY

## 2019-08-06 DIAGNOSIS — O03.9 MISCARRIAGE: ICD-10-CM

## 2019-08-06 DIAGNOSIS — Z31.83 IN VITRO FERTILIZATION: ICD-10-CM

## 2019-08-06 LAB — B-HCG SERPL-ACNC: 40 IU/L (ref 0–5)

## 2019-08-06 PROCEDURE — 36415 COLL VENOUS BLD VENIPUNCTURE: CPT | Performed by: OBSTETRICS & GYNECOLOGY

## 2019-08-06 PROCEDURE — 84702 CHORIONIC GONADOTROPIN TEST: CPT | Performed by: OBSTETRICS & GYNECOLOGY

## 2019-08-12 DIAGNOSIS — F41.1 GENERALIZED ANXIETY DISORDER: ICD-10-CM

## 2019-08-12 NOTE — TELEPHONE ENCOUNTER
"Requested Prescriptions   Pending Prescriptions Disp Refills     escitalopram (LEXAPRO) 10 MG tablet [Pharmacy Med Name: Escitalopram Oxalate Oral Tablet 10 MG]  Last Written Prescription Date:  05/16/19  Last Fill Quantity: 90,  # refills: 0   Last Office Visit with FMVIKTORIYA, CANDICE or [facility] prescribing provider:  07/09/19-Agbeh   Future Office Visit:    Next 5 appointments (look out 90 days)    Aug 13, 2019  5:30 PM CDT  Lab visit with [facility]  [facility] ([facility] [address] [place] [id number] [phone]        90 tablet 0     Sig: Take 1 tablet (10 mg) by mouth daily       SSRIs Protocol Failed - 8/12/2019  7:03 AM        Failed - No active pregnancy on record        Failed - No positive pregnancy test in last 12 months        Passed - Recent (12 mo) or future (30 days) visit within the authorizing provider's specialty     Patient had office visit in the last 12 months or has a visit in the next 30 days with authorizing provider or within the authorizing provider's specialty.  See \"Patient Info\" tab in inbasket, or \"Choose Columns\" in Meds & Orders section of the refill encounter.              Passed - Medication is active on med list        Passed - Patient is age 18 or older          "

## 2019-08-13 DIAGNOSIS — O00.109 TUBAL PREGNANCY WITHOUT INTRAUTERINE PREGNANCY, UNSPECIFIED LATERALITY: ICD-10-CM

## 2019-08-13 LAB — B-HCG SERPL-ACNC: 7 IU/L (ref 0–5)

## 2019-08-13 PROCEDURE — 84702 CHORIONIC GONADOTROPIN TEST: CPT | Performed by: OBSTETRICS & GYNECOLOGY

## 2019-08-13 PROCEDURE — 36415 COLL VENOUS BLD VENIPUNCTURE: CPT | Performed by: OBSTETRICS & GYNECOLOGY

## 2019-08-13 RX ORDER — ESCITALOPRAM OXALATE 10 MG/1
10 TABLET ORAL DAILY
Qty: 90 TABLET | Refills: 2 | Status: SHIPPED | OUTPATIENT
Start: 2019-08-13 | End: 2020-01-03

## 2019-08-13 NOTE — TELEPHONE ENCOUNTER
Prescription approved per [facility] Refill Protocol.  Daxa Lara RN

## 2019-08-20 DIAGNOSIS — O00.109 TUBAL PREGNANCY WITHOUT INTRAUTERINE PREGNANCY, UNSPECIFIED LATERALITY: ICD-10-CM

## 2019-08-20 LAB — B-HCG SERPL-ACNC: 1 IU/L (ref 0–5)

## 2019-08-20 PROCEDURE — 36415 COLL VENOUS BLD VENIPUNCTURE: CPT | Performed by: OBSTETRICS & GYNECOLOGY

## 2019-08-20 PROCEDURE — 84702 CHORIONIC GONADOTROPIN TEST: CPT | Performed by: OBSTETRICS & GYNECOLOGY

## 2019-11-08 ENCOUNTER — HEALTH MAINTENANCE LETTER (OUTPATIENT)
Age: 34
End: 2019-11-08

## 2019-11-27 ENCOUNTER — OFFICE VISIT (OUTPATIENT)
Dept: OPTOMETRY | Facility: CLINIC | Age: 34
End: 2019-11-27
Payer: COMMERCIAL

## 2019-11-27 DIAGNOSIS — Z01.00 EXAMINATION OF EYES AND VISION: Primary | ICD-10-CM

## 2019-11-27 DIAGNOSIS — H52.13 MYOPIA OF BOTH EYES: ICD-10-CM

## 2019-11-27 DIAGNOSIS — H52.223 REGULAR ASTIGMATISM OF BOTH EYES: ICD-10-CM

## 2019-11-27 PROCEDURE — 92004 COMPRE OPH EXAM NEW PT 1/>: CPT | Performed by: OPTOMETRIST

## 2019-11-27 PROCEDURE — 92015 DETERMINE REFRACTIVE STATE: CPT | Performed by: OPTOMETRIST

## 2019-11-27 ASSESSMENT — REFRACTION_MANIFEST
OD_AXIS: 030
OD_SPHERE: -1.00
OS_SPHERE: -0.75
OD_CYLINDER: +0.50
OS_CYLINDER: +0.25
OS_AXIS: 165

## 2019-11-27 ASSESSMENT — SLIT LAMP EXAM - LIDS
COMMENTS: NORMAL
COMMENTS: NORMAL

## 2019-11-27 ASSESSMENT — CONF VISUAL FIELD
OD_NORMAL: 1
OS_NORMAL: 1

## 2019-11-27 ASSESSMENT — VISUAL ACUITY
OD_CC: 20/20
OS_SC: 20/20
OS_SC: 20/20
METHOD: SNELLEN - LINEAR
OD_CC: 20/20
CORRECTION_TYPE: GLASSES
OD_SC: 20/20
OS_CC: 20/20
OD_SC: 20/20
OS_CC: 20/20

## 2019-11-27 ASSESSMENT — TONOMETRY
OS_IOP_MMHG: 15
OD_IOP_MMHG: 14
IOP_METHOD: TONOPEN

## 2019-11-27 ASSESSMENT — EXTERNAL EXAM - LEFT EYE: OS_EXAM: NORMAL

## 2019-11-27 ASSESSMENT — EXTERNAL EXAM - RIGHT EYE: OD_EXAM: NORMAL

## 2019-11-27 ASSESSMENT — REFRACTION_WEARINGRX
OS_SPHERE: -0.75
SPECS_TYPE: SVL
OS_AXIS: 005
OD_SPHERE: -1.00
OD_AXIS: 035
OD_CYLINDER: +0.50
OS_CYLINDER: +0.25

## 2019-11-27 ASSESSMENT — CUP TO DISC RATIO
OS_RATIO: 0.2
OD_RATIO: 0.2

## 2019-11-27 NOTE — PROGRESS NOTES
Chief Complaint   Patient presents with     Annual Eye Exam         Last Eye Exam: over 11 years  Dilated Previously: Yes    What are you currently using to see?  glasses       Distance Vision Acuity: Satisfied with vision    Near Vision Acuity: Satisfied with vision while reading  unaided    Eye Comfort: good  Do you use eye drops? : No  Occupation or Hobbies: Clinical Pharmacist    Aby Whitley  Vision Services Supervisor          Medical, surgical and family histories reviewed and updated 11/27/2019.       OBJECTIVE: See Ophthalmology exam    ASSESSMENT:    ICD-10-CM    1. Examination of eyes and vision Z01.00 EYE EXAM (SIMPLE-NONBILLABLE)     REFRACTION   2. Myopia of both eyes H52.13 EYE EXAM (SIMPLE-NONBILLABLE)     REFRACTION   3. Regular astigmatism of both eyes H52.223 EYE EXAM (SIMPLE-NONBILLABLE)     REFRACTION      PLAN:     Patient Instructions   Eyeglass prescription given.    Return in 1 year for a complete eye exam or sooner if needed.    Omer Mahoney, OD

## 2019-11-27 NOTE — PATIENT INSTRUCTIONS
Eyeglass prescription given.    Return in 1 year for a complete eye exam or sooner if needed.    Omer Mahoney, BILL    The affects of the dilating drops last for 4- 6 hours.  You will be more sensitive to light and vision will be blurry up close.  Mydriatic sunglasses were given if needed.      Optometry Providers       Clinic Locations                                 Telephone Number   Dr. Britney Mack   [place] and [place]   [place] [phone]     [facility] Hours:                [place] [facility] Hours:       [facility] Hours:   [address]   [address] [facility] [address]     [address]  [place] [address]   ANN [place] [address]    ANN Mack [address]  Phone: [phone]                    Phone: [phone]     Phone: [phone]                      Monday 8:00-7:00                          Monday 8:00-7:00                          Monday 8:00-7:00              Tuesday 8:00-6:00                          Tuesday 8:00-7:00                          Tuesday 8:00-7:00              Wednesday 8:00-6:00                  Wednesday 8:00-7:00                   Wednesday 8:00-7:00      Thursday 8:00-6:00                        Thursday 8:00-7:00                         Thursday 8:00-7:00            Friday 8:00-5:00                              Friday 8:00-5:00                              Friday 8:00-5:00    [facility] Hours:   [address] [place] [address]  [phone]    Monday 8:00-7:00  Tuesday 8:00-7:00  Wednesday 8:00-7:00  Thursday 8:00-7:00  Friday 8:00-5:00  Please log on to [email or web address] to order your contact lenses.  The link is found on the [facility] page.  As always, Thank you for trusting us with your health care needs!

## 2019-12-18 ENCOUNTER — TRANSFERRED RECORDS (OUTPATIENT)
Dept: HEALTH INFORMATION MANAGEMENT | Facility: CLINIC | Age: 34
End: 2019-12-18

## 2020-01-03 ENCOUNTER — PRENATAL OFFICE VISIT (OUTPATIENT)
Dept: OBGYN | Facility: CLINIC | Age: 35
End: 2020-01-03
Payer: COMMERCIAL

## 2020-01-03 ENCOUNTER — MYC MEDICAL ADVICE (OUTPATIENT)
Dept: OBGYN | Facility: CLINIC | Age: 35
End: 2020-01-03

## 2020-01-03 VITALS
DIASTOLIC BLOOD PRESSURE: 79 MMHG | HEART RATE: 79 BPM | TEMPERATURE: 97.7 F | WEIGHT: 235.2 LBS | HEIGHT: 67 IN | OXYGEN SATURATION: 98 % | SYSTOLIC BLOOD PRESSURE: 124 MMHG | BODY MASS INDEX: 36.91 KG/M2

## 2020-01-03 DIAGNOSIS — O09.529 ANTEPARTUM MULTIGRAVIDA OF ADVANCED MATERNAL AGE: ICD-10-CM

## 2020-01-03 DIAGNOSIS — O09.819 PREGNANCY RESULTING FROM IN VITRO FERTILIZATION, ANTEPARTUM: ICD-10-CM

## 2020-01-03 DIAGNOSIS — Z29.13 NEED FOR RHOGAM DUE TO RH NEGATIVE MOTHER: ICD-10-CM

## 2020-01-03 LAB
BASOPHILS # BLD AUTO: 0.1 10E9/L (ref 0–0.2)
BASOPHILS NFR BLD AUTO: 0.5 %
DIFFERENTIAL METHOD BLD: ABNORMAL
EOSINOPHIL # BLD AUTO: 0.4 10E9/L (ref 0–0.7)
EOSINOPHIL NFR BLD AUTO: 3.6 %
ERYTHROCYTE [DISTWIDTH] IN BLOOD BY AUTOMATED COUNT: 11.8 % (ref 10–15)
HCT VFR BLD AUTO: 42 % (ref 35–47)
HGB BLD-MCNC: 14.7 G/DL (ref 11.7–15.7)
LYMPHOCYTES # BLD AUTO: 2.7 10E9/L (ref 0.8–5.3)
LYMPHOCYTES NFR BLD AUTO: 22.3 %
MCH RBC QN AUTO: 32.8 PG (ref 26.5–33)
MCHC RBC AUTO-ENTMCNC: 35 G/DL (ref 31.5–36.5)
MCV RBC AUTO: 94 FL (ref 78–100)
MONOCYTES # BLD AUTO: 0.7 10E9/L (ref 0–1.3)
MONOCYTES NFR BLD AUTO: 5.7 %
NEUTROPHILS # BLD AUTO: 8.2 10E9/L (ref 1.6–8.3)
NEUTROPHILS NFR BLD AUTO: 67.9 %
PLATELET # BLD AUTO: 267 10E9/L (ref 150–450)
RBC # BLD AUTO: 4.48 10E12/L (ref 3.8–5.2)
WBC # BLD AUTO: 12.1 10E9/L (ref 4–11)

## 2020-01-03 PROCEDURE — 36415 COLL VENOUS BLD VENIPUNCTURE: CPT | Performed by: NURSE PRACTITIONER

## 2020-01-03 PROCEDURE — 87340 HEPATITIS B SURFACE AG IA: CPT | Performed by: NURSE PRACTITIONER

## 2020-01-03 PROCEDURE — 85025 COMPLETE CBC W/AUTO DIFF WBC: CPT | Performed by: NURSE PRACTITIONER

## 2020-01-03 PROCEDURE — 86762 RUBELLA ANTIBODY: CPT | Performed by: NURSE PRACTITIONER

## 2020-01-03 PROCEDURE — 87591 N.GONORRHOEAE DNA AMP PROB: CPT | Performed by: NURSE PRACTITIONER

## 2020-01-03 PROCEDURE — 86780 TREPONEMA PALLIDUM: CPT | Performed by: NURSE PRACTITIONER

## 2020-01-03 PROCEDURE — 87491 CHLMYD TRACH DNA AMP PROBE: CPT | Performed by: NURSE PRACTITIONER

## 2020-01-03 PROCEDURE — 87086 URINE CULTURE/COLONY COUNT: CPT | Performed by: NURSE PRACTITIONER

## 2020-01-03 PROCEDURE — 87389 HIV-1 AG W/HIV-1&-2 AB AG IA: CPT | Performed by: NURSE PRACTITIONER

## 2020-01-03 PROCEDURE — 86901 BLOOD TYPING SEROLOGIC RH(D): CPT | Performed by: NURSE PRACTITIONER

## 2020-01-03 PROCEDURE — 86850 RBC ANTIBODY SCREEN: CPT | Performed by: NURSE PRACTITIONER

## 2020-01-03 PROCEDURE — 99207 ZZC FIRST OB VISIT: CPT | Performed by: NURSE PRACTITIONER

## 2020-01-03 PROCEDURE — 86900 BLOOD TYPING SEROLOGIC ABO: CPT | Performed by: NURSE PRACTITIONER

## 2020-01-03 ASSESSMENT — MIFFLIN-ST. JEOR: SCORE: 1790.52

## 2020-01-03 ASSESSMENT — PAIN SCALES - GENERAL: PAINLEVEL: NO PAIN (0)

## 2020-01-03 NOTE — PROGRESS NOTES
Clarissa is a 34 year old  @ 9 weeks here for new OB visit.  [facility]. Had an ectopic pregnancy this past summer.   Repeat round of IVF in November. Patient has had 2 ultrasounds completed this pregnancy confirming dates. Forgot to bring the paperwork today, will send via [employer] to confirm dating.    See OB questionnaire for pertinent components of HPI.    OBhx: ectopic pregnancy treated with Methotrexate and labs were followed.  Gyne: Pap smears Normal  Past Medical History:   Diagnosis Date     Gastroesophageal reflux disease      Obese      Right upper quadrant pain      Past Surgical History:   Procedure Laterality Date     ENT SURGERY      wisdom teeth     LAPAROSCOPIC CHOLECYSTECTOMY N/A 2018    Procedure: LAPAROSCOPIC CHOLECYSTECTOMY;  LAPAROSCOPIC CHOLECYSTECTOMY ;  Surgeon: Kenneth Jo MD;  Location: [facility]     LAPAROSCOPIC GASTRIC SLEEVE N/A 2017    Procedure: LAPAROSCOPIC GASTRIC SLEEVE;  LAPAROSCOPIC GASTRIC SLEEVE;  Surgeon: Sloan Burnette MD;  Location:  OR     ORTHOPEDIC SURGERY  1991    Left elbow     Patient Active Problem List    Diagnosis Date Noted     Pregnancy resulting from in vitro fertilization, antepartum 2020     Priority: Medium     Antepartum multigravida of advanced maternal age 2020     Priority: Medium     Need for rhogam due to Rh negative mother 2020     Priority: Medium     Tubal ectopic pregnancy 2019     Priority: Medium     In vitro fertilization 2019     Priority: Medium     Obesity (BMI 35.0-39.9) with comorbidity (H) 2019     Priority: Medium     Symptomatic cholelithiasis 2018     Priority: Medium     Obesity (BMI 30-39.9) 2018     Priority: Medium     GERD without esophagitis 2018     Priority: Medium     Postsurgical malabsorption 2018     Priority: Medium     Bariatric surgery status 08/15/2017     Priority:  "Medium     History of infertility 05/10/2017     Priority: Medium     MONTEZ (generalized anxiety disorder) 03/28/2016     Priority: Medium     CARDIOVASCULAR SCREENING; LDL GOAL LESS THAN 160 10/31/2010     Priority: Medium        Allergies   Allergen Reactions     No Clinical Screening - See Comments Anaphylaxis     Mice droppings     Current Outpatient Medications   Medication Sig Dispense Refill     Cyanocobalamin (B-12-SL SL) Place 1,000 mcg under the tongue daily        famotidine (PEPCID) 40 MG tablet Take 1 tablet (40 mg) by mouth 2 times daily as needed for heartburn 180 tablet 0     Iron-Vitamin C (VITRON-C PO) Take 1 tablet by mouth every morning       Prenatal Multivit-Min-Fe-FA (PRENATAL VITAMINS PO)        VITAMIN D, CHOLECALCIFEROL, PO Take 2,000 Units by mouth daily         Review of Systems:     CONSTITUTIONAL:NEGATIVE for fever, chills, change in weight  INTEGUMENTARY/SKIN: NEGATIVE for worrisome rashes, moles or lesions  EYES: NEGATIVE for vision changes or irritation  ENT/MOUTH: NEGATIVE for ear, mouth and throat problems  RESP:NEGATIVE for significant cough or SOB  BREAST: NEGATIVE for masses, tenderness or discharge  CV: NEGATIVE for chest pain, palpitations or peripheral edema  GI: NEGATIVE for nausea, abdominal pain, heartburn, or change in bowel habits  :  Denies current vaginal bleeding, abnormal vaginal discharge  NEURO: NEGATIVE for weakness, dizziness or paresthesias  HEME/ALLERGY/IMMUNE: NEGATIVE for bleeding problems  PSYCHIATRIC: NEGATIVE for changes in mood or affect      Past Medical History of Father of Baby: No significant medical history  History Since Last Menstrual Period: Nausea    Physical Exam: /79 (BP Location: Right arm, Patient Position: Sitting, Cuff Size: Adult Large)   Pulse 79   Temp 97.7  F (36.5  C) (Oral)   Ht 1.695 m (5' 6.75\")   Wt 106.7 kg (235 lb 3.2 oz)   SpO2 98%   BMI 37.11 kg/m    General: Well developed, well nourished female  Skin: " Normal  HEENT: Normal  Neck: Supple,no adenopathy,thyroid normal  Chest: Clear to auscultation  Heart: Regular rate, rhythm,No murmur, rub, gallop  Abdomen: Benign and No masses, organomegaly    Extremities: Normal  Neurological: Normal   Perineum: Intact   Vulva: Normal  Vagina: Normal mucosa, no discharge  Cervix: Nulliparous, closed, mobile,  no discharge  Uterus: 9 weeks, Normal shape, position and consistency   Adnexa: Normal  Bony Pelvis: Adequate     A/P 34 year old  at 9 weeks  Discussed physician coverage, delivery hospital, tertiary support, diet, exercise, weight gain, schedule of visits, routine and indicated ultrasounds, and childbirth education.  Prenatal labs: Prenatal Panel, GC, Chlamydia, Urine Culture.  Continue taking prenatal vitamins  Discussed maternal quad screening between 15-20 weeks and reviewed benefits and limitations.  Given her age, we discussed level 2 ultrasound with MFM and also first trimester screening, which they would like to do, will send referral.  Patient will send in her early ultrasound reports via [employer].      Desi ALCALA CNP

## 2020-01-04 LAB
BACTERIA SPEC CULT: NORMAL
RUBV IGG SERPL IA-ACNC: 29 IU/ML
SPECIMEN SOURCE: NORMAL
T PALLIDUM AB SER QL: NONREACTIVE

## 2020-01-05 LAB
C TRACH DNA SPEC QL NAA+PROBE: NEGATIVE
N GONORRHOEA DNA SPEC QL NAA+PROBE: NEGATIVE
SPECIMEN SOURCE: NORMAL
SPECIMEN SOURCE: NORMAL

## 2020-01-06 PROBLEM — O26.899 RH NEGATIVE STATE IN ANTEPARTUM PERIOD: Status: ACTIVE | Noted: 2020-01-06

## 2020-01-06 PROBLEM — Z67.91 RH NEGATIVE STATE IN ANTEPARTUM PERIOD: Status: ACTIVE | Noted: 2020-01-06

## 2020-01-06 LAB
ABO + RH BLD: NORMAL
ABO + RH BLD: NORMAL
BLD GP AB SCN SERPL QL: NORMAL
BLOOD BANK CMNT PATIENT-IMP: NORMAL
HBV SURFACE AG SERPL QL IA: NONREACTIVE
HIV 1+2 AB+HIV1 P24 AG SERPL QL IA: NONREACTIVE
SPECIMEN EXP DATE BLD: NORMAL

## 2020-01-17 ENCOUNTER — MYC MEDICAL ADVICE (OUTPATIENT)
Dept: OBGYN | Facility: CLINIC | Age: 35
End: 2020-01-17

## 2020-01-17 NOTE — TELEPHONE ENCOUNTER
At her next visit (and after her first trimester screening with MFM) we can send the referral for the level 2. They usually prefer we do it a little further down the road so we can send updated prenatal visit notes, lab results etc. If she is all set for first trimester screening, should be good for now and they will discuss the fetal echo with them. Thank you. Desi ALCALA CNP

## 2020-01-17 NOTE — TELEPHONE ENCOUNTER
"Per OV note 1/3/2020: \"Clarissa is a 34 year old  @ 9 weeks here for new OB visit.  Patient has been working with [facility] due to infertility. Had an ectopic pregnancy this past summer.   Repeat round of IVF in November. Patient has had 2 ultrasounds completed this pregnancy confirming dates. Forgot to bring the paperwork today, will send via [employer] to confirm dating.    A/P 34 year old  at 9 weeks  Discussed physician coverage, delivery hospital, tertiary support, diet, exercise, weight gain, schedule of visits, routine and indicated ultrasounds, and childbirth education.  Prenatal labs: Prenatal Panel, GC, Chlamydia, Urine Culture.  Continue taking prenatal vitamins  Discussed maternal quad screening between 15-20 weeks and reviewed benefits and limitations.  Given her age, we discussed level 2 ultrasound with MFM and also first trimester screening, which they would like to do, will send referral.  Patient will send in her early ultrasound reports via [employer]. \"     RN notes ultrasound results patient sent via [employer] located under media 1/3/2020.    No MFM referral noted in patient chart.     RN to route to Desi for further advisement on MFM referral.    Evie Samuel RN on 2020 at 2:22 PM    "

## 2020-01-17 NOTE — TELEPHONE ENCOUNTER
Maternal Fetal Medicine referral was sent in for first trimester screening-has she heard from them?   She will also have a level 2 ultrasound with MF around 18 weeks.   The ECHO is for baby, not patient. [facility] discusses this further at her first trimester screening appointment. If she has not received a call from them can we check with [facility] if they received our referral, otherwise a new one can be faxed. Thank you. Desi ALCALA CNP

## 2020-01-17 NOTE — TELEPHONE ENCOUNTER
Desi Deras, CARI NAVARRO 12 minutes ago (2:37 PM)         Maternal Fetal Medicine referral was sent in for first trimester screening-has she heard from them?   She will also have a level 2 ultrasound with [facility] around 18 weeks.   The ECHO is for baby, not patient. [facility] discusses this further at her first trimester screening appointment. If she has not received a call from them can we check with [facility] if they received our referral, otherwise a new one can be faxed. Thank you. Desi ALCALA CNP           RN called [facility] to confirm referral placed. M confirmed referral placed for first trimester screening and that patient is scheduled. RN asked regarding the level 2 ultrasound and MFM stated they do not have a referral for this and an additional one will need to be placed for a level 2 ultrasound.    RN routing to [facility] regarding level 2 ultrasound referral, RN can fill this out once [facility] confirms.    Evie Samuel RN on 1/17/2020 at 2:56 PM

## 2020-01-20 ENCOUNTER — TELEPHONE (OUTPATIENT)
Dept: OBGYN | Facility: CLINIC | Age: 35
End: 2020-01-20

## 2020-01-20 ENCOUNTER — ANCILLARY PROCEDURE (OUTPATIENT)
Dept: ULTRASOUND IMAGING | Facility: CLINIC | Age: 35
End: 2020-01-20
Attending: OBSTETRICS & GYNECOLOGY
Payer: COMMERCIAL

## 2020-01-20 DIAGNOSIS — Z87.59 HX OF ECTOPIC PREGNANCY: ICD-10-CM

## 2020-01-20 DIAGNOSIS — O09.819 PREGNANCY RESULTING FROM IN VITRO FERTILIZATION, ANTEPARTUM: ICD-10-CM

## 2020-01-20 DIAGNOSIS — O09.819 PREGNANCY RESULTING FROM IN VITRO FERTILIZATION, ANTEPARTUM: Primary | ICD-10-CM

## 2020-01-20 PROCEDURE — 76801 OB US < 14 WKS SINGLE FETUS: CPT

## 2020-01-20 NOTE — TELEPHONE ENCOUNTER
RN took call from patient.    Patient is 11w4d, IVF, hx of ectopic pregnancy    Patient states spotting began this morning, very light, only when wiping and brown in color.    Patient states she has been having some dull cramping and lower pelvic pressure, but this is not new in onset.     Patient is wondering if she should come in for an ultrasound or if she should continue to monitor.    Patient has MFM referral and states she will see MFM next Wednesday for the level 2 ultrasound.     RN states she will speak with Dr. Phillip regarding next steps and call the patient back. Patient in agreement to this plan and had no further questions or concerns.    Evie Samuel RN on 1/20/2020 at 3:03 PM      RN called patient back after huddling with Dr. Phillip. Dr. Phillip recommended a ultrasound today and gave verbal orders. RN placed ultrasound orders and transferred patient to ultrasound scheduling.     Patient had no further questions and agreed to plan.    Evie Samuel RN on 1/20/2020 at 3:16 PM

## 2020-01-29 ENCOUNTER — TRANSFERRED RECORDS (OUTPATIENT)
Dept: HEALTH INFORMATION MANAGEMENT | Facility: CLINIC | Age: 35
End: 2020-01-29

## 2020-01-29 ENCOUNTER — PRENATAL OFFICE VISIT (OUTPATIENT)
Dept: OBGYN | Facility: CLINIC | Age: 35
End: 2020-01-29
Payer: COMMERCIAL

## 2020-01-29 VITALS
OXYGEN SATURATION: 98 % | BODY MASS INDEX: 36.72 KG/M2 | WEIGHT: 232.7 LBS | DIASTOLIC BLOOD PRESSURE: 68 MMHG | HEART RATE: 75 BPM | SYSTOLIC BLOOD PRESSURE: 115 MMHG

## 2020-01-29 DIAGNOSIS — O21.0 HYPEREMESIS GRAVIDARUM: Primary | ICD-10-CM

## 2020-01-29 PROCEDURE — 99207 ZZC PRENATAL VISIT: CPT | Performed by: OBSTETRICS & GYNECOLOGY

## 2020-01-29 RX ORDER — ONDANSETRON 4 MG/1
4 TABLET, FILM COATED ORAL EVERY 6 HOURS PRN
Qty: 30 TABLET | Refills: 1 | Status: SHIPPED | OUTPATIENT
Start: 2020-01-29 | End: 2020-04-01

## 2020-01-29 NOTE — PATIENT INSTRUCTIONS
If you have any questions regarding your visit, Please contact your care team.    [facility]: [phone]      [facility] HOURS TELEPHONE NUMBER   Lupe DO Kenji.    SONY Friend -    SANDRA Egan, SANDRA Goetz RN     Monday, Wednesday, Thursday and Friday, [place]  8:30a.m-5:00 p.m   [facility]  [address] [place] [address]  [phone] ask for [facility]    Imaging [phone]       Urgent Care locations:    [facility] Saturday and Sunday   9 am - 5 pm    Monday-Friday   12 pm - 8 pm  Saturday and Sunday   9 am - 5 pm   [phone]     [facility] Labor and Delivery:  [phone]    If you need a medication refill, please contact your pharmacy. Please allow 3 business days for your refill to be completed.  As always, Thank you for trusting us with your healthcare needs!

## 2020-01-29 NOTE — PROGRESS NOTES
She has not felt fetal flutters yet but denies any vaginal spotting or regular uterine contractions.  She stopped taking the estrogen and progesterone meds 2 weeks ago at 10 weeks gestation as instructed by SARA.  She had her genetic counseling appt today and is scheduled for a level 2 OB US with [facility] as well.  She admits to daily nausea and subsequent weight loss (3 lbs since her last visit), therefore she would like a script for an anti-emetic and instructions for Zofran use were provided.

## 2020-03-04 ENCOUNTER — PRENATAL OFFICE VISIT (OUTPATIENT)
Dept: OBGYN | Facility: CLINIC | Age: 35
End: 2020-03-04
Payer: COMMERCIAL

## 2020-03-04 VITALS
HEART RATE: 78 BPM | DIASTOLIC BLOOD PRESSURE: 73 MMHG | BODY MASS INDEX: 36.39 KG/M2 | WEIGHT: 230.6 LBS | SYSTOLIC BLOOD PRESSURE: 111 MMHG | OXYGEN SATURATION: 97 %

## 2020-03-04 DIAGNOSIS — O09.522 MULTIGRAVIDA OF ADVANCED MATERNAL AGE IN SECOND TRIMESTER: Primary | ICD-10-CM

## 2020-03-04 PROCEDURE — 99207 ZZC PRENATAL VISIT: CPT | Performed by: OBSTETRICS & GYNECOLOGY

## 2020-03-04 NOTE — PROGRESS NOTES
She has not felt fetal flutters yet but will record when this starts.  She lost 2 lbs but no longer takes Zofran since her hyperemesis is improving and she has felt better, especially over the past week.  She has her level 2 US already scheduled with MFM later this month.  She is Rh negative so was provided an ACOG pamphlet on this today.

## 2020-03-04 NOTE — PATIENT INSTRUCTIONS
If you have any questions regarding your visit, Please contact your care team.    [facility]: [phone]      [facility] HOURS TELEPHONE NUMBER   Lupe DO Kenji.    SONY Friend -    SANDRA Egan, SANDRA Goetz RN     Monday, Wednesday, Thursday and Friday, [place]  8:30a.m-5:00 p.m   [facility]  [address] [place] [address]  [phone] ask for [facility]    Imaging [phone]       Urgent Care locations:    [facility] Saturday and Sunday   9 am - 5 pm    Monday-Friday   12 pm - 8 pm  Saturday and Sunday   9 am - 5 pm   [phone]     [facility] Labor and Delivery:  [phone]    If you need a medication refill, please contact your pharmacy. Please allow 3 business days for your refill to be completed.  As always, Thank you for trusting us with your healthcare needs!

## 2020-03-07 DIAGNOSIS — K21.9 GERD WITHOUT ESOPHAGITIS: ICD-10-CM

## 2020-03-08 NOTE — TELEPHONE ENCOUNTER
"Requested Prescriptions   Pending Prescriptions Disp Refills     famotidine (PEPCID) 40 MG tablet [Pharmacy Med Name: Famotidine Oral Tablet 40 MG] 7 tablet 0     Sig: Take 1 tablet (40 mg) by mouth 2 times daily as needed for heartburn       H2 Blockers Protocol Passed - 3/7/2020  8:41 AM        Passed - Patient is age 12 or older        Passed - Recent (12 mo) or future (30 days) visit within the authorizing provider's specialty     Patient has had an office visit with the authorizing provider or a provider within the authorizing providers department within the previous 12 mos or has a future within next 30 days. See \"Patient Info\" tab in inbasket, or \"Choose Columns\" in Meds & Orders section of the refill encounter.              Passed - Medication is active on med list           Last Written Prescription Date:  12/13/19  Last Fill Quantity: 180,  # refills: 3   Last office visit: 5/22/2019 with prescribing provider:     Future Office Visit:      "

## 2020-03-10 NOTE — TELEPHONE ENCOUNTER
Routing to OB to determine if this medication is safe for patient at this time.       Javier Go RN, BSN, PHN

## 2020-03-11 ENCOUNTER — TRANSFERRED RECORDS (OUTPATIENT)
Dept: HEALTH INFORMATION MANAGEMENT | Facility: CLINIC | Age: 35
End: 2020-03-11

## 2020-03-11 ENCOUNTER — MYC MEDICAL ADVICE (OUTPATIENT)
Dept: FAMILY MEDICINE | Facility: CLINIC | Age: 35
End: 2020-03-11

## 2020-03-11 ENCOUNTER — MEDICAL CORRESPONDENCE (OUTPATIENT)
Dept: HEALTH INFORMATION MANAGEMENT | Facility: CLINIC | Age: 35
End: 2020-03-11

## 2020-03-12 RX ORDER — FAMOTIDINE 40 MG/1
40 TABLET, FILM COATED ORAL 2 TIMES DAILY PRN
Qty: 7 TABLET | Refills: 0 | Status: SHIPPED | OUTPATIENT
Start: 2020-03-12 | End: 2020-03-13

## 2020-03-13 ENCOUNTER — MYC MEDICAL ADVICE (OUTPATIENT)
Dept: OBGYN | Facility: CLINIC | Age: 35
End: 2020-03-13

## 2020-03-13 DIAGNOSIS — K21.9 GERD WITHOUT ESOPHAGITIS: ICD-10-CM

## 2020-03-13 RX ORDER — FAMOTIDINE 40 MG/1
40 TABLET, FILM COATED ORAL 2 TIMES DAILY PRN
Qty: 180 TABLET | Refills: 0 | Status: SHIPPED | OUTPATIENT
Start: 2020-03-13 | End: 2020-06-10

## 2020-03-13 NOTE — TELEPHONE ENCOUNTER
Please see mychart encounter from today 3/13/2020. Dr. Kenji farah'radha refill for 90 day supply (180 tablets) RN will refill as instructed and message patient.     Bethany Jaffe, RN on 3/13/2020 at 4:43 PM

## 2020-03-13 NOTE — TELEPHONE ENCOUNTER
RN consulted with Dr. Phillip in clinic. Dr. Kenji farah'radha refill for 90 day supply (180 tablets) RN will refill as instructed and messaged patient.     Bethany Jaffe RN on 3/13/2020 at 4:43 PM

## 2020-03-13 NOTE — TELEPHONE ENCOUNTER
Reason for Call:  Other prescription    Detailed comments: Pt is currently pregnant and asked the pharmacy to reach out to see if she could obtain a one month supply.Thank you.    Phone Number Patient can be reached at: Other phone number: [phone]    Best Time: Any    Can we leave a detailed message on this number? Not Applicable    Call taken on 3/13/2020 at 4:39 PM by Fallon Mcguire

## 2020-03-13 NOTE — TELEPHONE ENCOUNTER
Outpatient Medication Detail      Disp  Refills  Start  End  NOHEMY    famotidine (PEPCID) 40 MG tablet  7 tablet  0  3/12/2020   No    Sig - Route: Take 1 tablet (40 mg) by mouth 2 times daily as needed for heartburn - Oral    Sent to pharmacy as: famotidine (PEPCID) 40 MG tablet      RN will route to prescribing provider Dr. Phillip for review.     Bethany Jaffe RN on 3/13/2020 at 4:25 PM

## 2020-03-18 ENCOUNTER — TELEPHONE (OUTPATIENT)
Dept: OBGYN | Facility: CLINIC | Age: 35
End: 2020-03-18

## 2020-03-18 NOTE — TELEPHONE ENCOUNTER
[facility] Call Center    Phone Message    May a detailed message be left on voicemail: yes     Reason for Call: Order(s): Other:   Patient has appt on 04/01, patient will need US at appt. Please place order.     Action Taken: Message routed to:  [facility] p [phone]

## 2020-03-18 NOTE — TELEPHONE ENCOUNTER
Lupe Phillip, DO  [facility] Triage 14 minutes ago (1:42 PM)       She is supposed to be scheduled with MFM for a level 2 US toward the end of this month so she would not need an US with me.  Did she cancel this appt?      Left pt a detailed message relaying Dr. Pihllip's message above.    Julisa Loco RN

## 2020-04-01 ENCOUNTER — VIRTUAL VISIT (OUTPATIENT)
Dept: OBGYN | Facility: CLINIC | Age: 35
End: 2020-04-01
Payer: COMMERCIAL

## 2020-04-01 DIAGNOSIS — O09.522 MULTIGRAVIDA OF ADVANCED MATERNAL AGE IN SECOND TRIMESTER: Primary | ICD-10-CM

## 2020-04-01 DIAGNOSIS — O21.0 HYPEREMESIS GRAVIDARUM: ICD-10-CM

## 2020-04-01 PROCEDURE — 99207 ZZC PRENATAL VISIT: CPT | Mod: TEL | Performed by: NURSE PRACTITIONER

## 2020-04-01 RX ORDER — ONDANSETRON 4 MG/1
4 TABLET, FILM COATED ORAL EVERY 6 HOURS PRN
Qty: 30 TABLET | Refills: 0 | Status: SHIPPED | OUTPATIENT
Start: 2020-04-01 | End: 2020-09-15

## 2020-04-01 NOTE — PROGRESS NOTES
"Clarissa Pedroza is a 34 year old female who is being evaluated via a billable telephone visit.      The patient has been notified of following:     \"This telephone visit will be conducted via a call between you and your physician/provider. We have found that certain health care needs can be provided without the need for a physical exam.  This service lets us provide the care you need with a short phone conversation.  If a prescription is necessary we can send it directly to your pharmacy.  If lab work is needed we can place an order for that and you can then stop by our lab to have the test done at a later time.    If during the course of the call the physician/provider feels a telephone visit is not appropriate, you will not be charged for this service.\"     Patient has given verbal consent for Telephone visit?  Yes    Clarissa Pedroza complains of    Chief Complaint   Patient presents with     Prenatal Care       I have reviewed and updated the patient's Past Medical History, Social History, Family History and Medication List.    ALLERGIES  No clinical screening - see comments    Additional provider notes:    Prenatal flowsheet reviewed and updated as needed.  Denies vaginal bleeding, loss of fluid, contractions or cramping.  Patient with complaint. Requests refill on Zofran. Had been doing well until about 5 days ago, feeling the nausea increasing slowly. No vomiting. Has a few Zofran tablets left, would like to have the prescription available if needed and this is sent.   Fetal ECHO scheduled 4/15/2020 and has her growth ultrasound scheduled at 28 weeks with [facility].   Plan labs on return to clinic.  Advice as per Anticipatory Guidance/Checklist updated.    Questions asked and answered.   Discussed plan for prenatal visit schedule and appointment location going forward at this time and patient verbalizes understanding.    CARI Madrigal CNP    "

## 2020-04-06 ENCOUNTER — MYC MEDICAL ADVICE (OUTPATIENT)
Dept: OBGYN | Facility: CLINIC | Age: 35
End: 2020-04-06

## 2020-04-06 NOTE — LETTER
[address] [address]  [phone]      RE: Clarissa Pedroza  : 1985    DATE: 2020      To Whom It May Concern,        Clarissa Pedroza is under my care for her pregnancy. Due to more than one factor, her pregnancy is considered a high risk pregnancy. Any accomodation that can be made to help ensure a healthy pregnancy is recommended. We appreciate your cooperation during this uncertain time.    Thank you.      Sincerely,            Desi ALCALA CNP

## 2020-04-06 NOTE — TELEPHONE ENCOUNTER
I will type up a letter and will leave it pending in her chart until she responds how she wants to receive the letter. Probably best we send it via [employer] so she does not need to come to clinic to . Desi ALCALA CNP

## 2020-04-06 NOTE — TELEPHONE ENCOUNTER
Pt's last prenatal visit was with CARI Jenkins CNP, via telephone on 4/1/2020.    Will route pt's mychart request to Desi and Dr. Phillip.    Julisa Loco RN

## 2020-04-13 ENCOUNTER — PRE VISIT (OUTPATIENT)
Dept: MATERNAL FETAL MEDICINE | Facility: CLINIC | Age: 35
End: 2020-04-13

## 2020-04-15 ENCOUNTER — OFFICE VISIT (OUTPATIENT)
Dept: CARDIOLOGY | Facility: CLINIC | Age: 35
End: 2020-04-15

## 2020-04-15 ENCOUNTER — PRENATAL OFFICE VISIT (OUTPATIENT)
Dept: OBGYN | Facility: CLINIC | Age: 35
End: 2020-04-15
Payer: COMMERCIAL

## 2020-04-15 ENCOUNTER — HOSPITAL ENCOUNTER (OUTPATIENT)
Dept: CARDIOLOGY | Facility: CLINIC | Age: 35
Discharge: HOME | End: 2020-04-15
Admitting: NURSE PRACTITIONER
Payer: COMMERCIAL

## 2020-04-15 VITALS
DIASTOLIC BLOOD PRESSURE: 71 MMHG | WEIGHT: 232.7 LBS | HEART RATE: 77 BPM | BODY MASS INDEX: 36.72 KG/M2 | OXYGEN SATURATION: 97 % | SYSTOLIC BLOOD PRESSURE: 118 MMHG

## 2020-04-15 DIAGNOSIS — Z34.82 ENCOUNTER FOR SUPERVISION OF OTHER NORMAL PREGNANCY IN SECOND TRIMESTER: ICD-10-CM

## 2020-04-15 DIAGNOSIS — O26.90 PREGNANCY RELATED CONDITION, ANTEPARTUM: ICD-10-CM

## 2020-04-15 DIAGNOSIS — O35.BXX0 ANOMALY OF HEART OF FETUS AFFECTING PREGNANCY, ANTEPARTUM, SINGLE OR UNSPECIFIED FETUS: Primary | ICD-10-CM

## 2020-04-15 DIAGNOSIS — R21 RASH: Primary | ICD-10-CM

## 2020-04-15 LAB
ALBUMIN SERPL-MCNC: 2.9 G/DL (ref 3.4–5)
ALP SERPL-CCNC: 51 U/L (ref 40–150)
ALT SERPL W P-5'-P-CCNC: 25 U/L (ref 0–50)
AST SERPL W P-5'-P-CCNC: 16 U/L (ref 0–45)
BILIRUB DIRECT SERPL-MCNC: 0.1 MG/DL (ref 0–0.2)
BILIRUB SERPL-MCNC: 0.6 MG/DL (ref 0.2–1.3)
INR PPP: 1.02 (ref 0.86–1.14)
PROT SERPL-MCNC: 6.7 G/DL (ref 6.8–8.8)

## 2020-04-15 PROCEDURE — 83789 MASS SPECTROMETRY QUAL/QUAN: CPT | Mod: 90 | Performed by: OBSTETRICS & GYNECOLOGY

## 2020-04-15 PROCEDURE — 85610 PROTHROMBIN TIME: CPT | Performed by: OBSTETRICS & GYNECOLOGY

## 2020-04-15 PROCEDURE — 36415 COLL VENOUS BLD VENIPUNCTURE: CPT | Performed by: OBSTETRICS & GYNECOLOGY

## 2020-04-15 PROCEDURE — 99207 ZZC COMPLICATED OB VISIT: CPT | Performed by: OBSTETRICS & GYNECOLOGY

## 2020-04-15 PROCEDURE — 76825 ECHO EXAM OF FETAL HEART: CPT

## 2020-04-15 PROCEDURE — 80076 HEPATIC FUNCTION PANEL: CPT | Performed by: OBSTETRICS & GYNECOLOGY

## 2020-04-15 PROCEDURE — 99000 SPECIMEN HANDLING OFFICE-LAB: CPT | Performed by: OBSTETRICS & GYNECOLOGY

## 2020-04-15 NOTE — PATIENT INSTRUCTIONS
If you have any questions regarding your visit, Please contact your care team.    [facility]: [phone]      [facility] CLINIC HOURS TELEPHONE NUMBER   Lupe Phillip .    SONY Friend -  [place] -       SANDRA Egan, RN  Evie, RN     Monday, Wednesday, Thursday and Friday, [place]  8:30a.m-5:00 p.m   [facility]  [address] [place] [address]  [phone] ask for [facility]    Imaging [phone]       Urgent Care locations:    [facility] Saturday and Sunday   9 am - 5 pm    Monday-Friday   12 pm - 8 pm  Saturday and Sunday   9 am - 5 pm   [phone]     [facility] Labor and Delivery:  [phone]    If you need a medication refill, please contact your pharmacy. Please allow 3 business days for your refill to be completed.  As always, Thank you for trusting us with your healthcare needs!

## 2020-04-15 NOTE — PROGRESS NOTES
She reports feeling reassuring daily fetal activity and will continue to record.  She gained 2 lbs since her last visit and denies any fluid leakage or regular uterine contractions.  She first noted a fine granulated rash over her back yesterday (actually her  noted this).  However, she denies any pruritus or involvement of other areas of her body including the palms and soles.  She denies any topical irritants or dietary changes.  Her fetal ECHO is scheduled for 1445 today so results are pending.  Her growth OB US will be checked at 28 and 34 weeks gestation with MFM.  Will check labwork today for possible cholestasis - bile acids, liver function tests, and prothrombin time.  If this rash worsens, then she is to notify us immediately.

## 2020-04-15 NOTE — PROGRESS NOTES
Fetal Cardiology Consult    Date of Visit:   4/15/2020  Gestational Age: 23w5d   Due Date:  Aug 7, 2020   Delivery:  [place]       Dear Dr. Baumann    I had the opportunity to meet with Clarissa today for a Fetal Cardiology Consult and Fetal Echocardiogram.    The Fetal Echo demonstrated a structurally normal heart with normal function and no signs of hydrops. We discussed these normal findings and the limitations of a fetal echocardiogram.  I answered all of Clarissa's questions and at this time there is no further cardiology follow up needed.     Thank you for allowing me to participate in Clarissa's care.  Feel free to contact me with questions.    I spend 10 minutes counseling the patient about her fetal echocardiogram findings. All of this time was face to face.    Dr Vale Shin  Pediatric Cardiologist  [facility]  Phone [phone]

## 2020-04-20 LAB
BILE AC SERPL-SCNC: 1.6 UMOL/L (ref 0–2.5)
CDCAE SERPL-SCNC: 0.8 UMOL/L (ref 0–3.4)
CHOLATE SERPL-SCNC: 3 UMOL/L (ref 0–7)
DO-CHOLATE SERPL-SCNC: 0.3 UMOL/L (ref 0–1.9)
URSODEOXYCHOLATE SERPL-SCNC: 0.3 UMOL/L (ref 0–1)

## 2020-04-23 ENCOUNTER — MYC MEDICAL ADVICE (OUTPATIENT)
Dept: OBGYN | Facility: CLINIC | Age: 35
End: 2020-04-23

## 2020-04-26 ENCOUNTER — MYC MEDICAL ADVICE (OUTPATIENT)
Dept: OBGYN | Facility: CLINIC | Age: 35
End: 2020-04-26

## 2020-04-27 DIAGNOSIS — D64.9 ANEMIA, UNSPECIFIED TYPE: Primary | ICD-10-CM

## 2020-04-27 NOTE — TELEPHONE ENCOUNTER
Lupe Phillip, DO  [facility] Triage 2 minutes ago (8:48 AM)       Please call pt to let her know that she should have a hgb drawn on 4/30/2020 at her next appt to determine if she needs more iron.

## 2020-04-30 ENCOUNTER — PRENATAL OFFICE VISIT (OUTPATIENT)
Dept: OBGYN | Facility: CLINIC | Age: 35
End: 2020-04-30
Payer: COMMERCIAL

## 2020-04-30 VITALS
WEIGHT: 232 LBS | SYSTOLIC BLOOD PRESSURE: 128 MMHG | DIASTOLIC BLOOD PRESSURE: 73 MMHG | BODY MASS INDEX: 36.61 KG/M2 | OXYGEN SATURATION: 99 % | HEART RATE: 77 BPM

## 2020-04-30 DIAGNOSIS — O26.899 RH NEGATIVE STATE IN ANTEPARTUM PERIOD: ICD-10-CM

## 2020-04-30 DIAGNOSIS — K21.9 GERD WITHOUT ESOPHAGITIS: ICD-10-CM

## 2020-04-30 DIAGNOSIS — O09.819 PREGNANCY RESULTING FROM IN VITRO FERTILIZATION, ANTEPARTUM: Primary | ICD-10-CM

## 2020-04-30 DIAGNOSIS — O09.522 MULTIGRAVIDA OF ADVANCED MATERNAL AGE IN SECOND TRIMESTER: ICD-10-CM

## 2020-04-30 DIAGNOSIS — Z67.91 RH NEGATIVE STATE IN ANTEPARTUM PERIOD: ICD-10-CM

## 2020-04-30 DIAGNOSIS — O09.529 ANTEPARTUM MULTIGRAVIDA OF ADVANCED MATERNAL AGE: ICD-10-CM

## 2020-04-30 PROBLEM — Z29.13 NEED FOR RHOGAM DUE TO RH NEGATIVE MOTHER: Status: RESOLVED | Noted: 2020-01-03 | Resolved: 2020-04-30

## 2020-04-30 PROBLEM — E66.9 OBESITY (BMI 30-39.9): Status: RESOLVED | Noted: 2018-02-14 | Resolved: 2020-04-30

## 2020-04-30 LAB
ABO + RH BLD: NORMAL
ABO + RH BLD: NORMAL
BLD GP AB SCN SERPL QL: NORMAL
BLOOD BANK CMNT PATIENT-IMP: NORMAL
GLUCOSE 1H P 50 G GLC PO SERPL-MCNC: 117 MG/DL (ref 60–129)
HGB BLD-MCNC: 12.1 G/DL (ref 11.7–15.7)
SPECIMEN EXP DATE BLD: NORMAL

## 2020-04-30 PROCEDURE — 85018 HEMOGLOBIN: CPT | Performed by: NURSE PRACTITIONER

## 2020-04-30 PROCEDURE — 82950 GLUCOSE TEST: CPT | Performed by: NURSE PRACTITIONER

## 2020-04-30 PROCEDURE — 86901 BLOOD TYPING SEROLOGIC RH(D): CPT | Performed by: NURSE PRACTITIONER

## 2020-04-30 PROCEDURE — 99207 ZZC PRENATAL VISIT: CPT | Performed by: OBSTETRICS & GYNECOLOGY

## 2020-04-30 PROCEDURE — 86850 RBC ANTIBODY SCREEN: CPT | Performed by: NURSE PRACTITIONER

## 2020-04-30 PROCEDURE — 36415 COLL VENOUS BLD VENIPUNCTURE: CPT | Performed by: NURSE PRACTITIONER

## 2020-04-30 PROCEDURE — 86780 TREPONEMA PALLIDUM: CPT | Performed by: NURSE PRACTITIONER

## 2020-04-30 PROCEDURE — 86900 BLOOD TYPING SEROLOGIC ABO: CPT | Performed by: NURSE PRACTITIONER

## 2020-04-30 NOTE — PROGRESS NOTES
34 year [id number] at 25w6d weeks presents to the clinic for a routine prenatal visit.  No concerns.  No vaginal bleeding, leakage of fluid, or contractions   IVF=pt had a normal fetal ECHO. Patient has her next ultrasound scheduled with MFM already  Good fetal movement.  UWPp=864's  Fundal height=27cm    Normal anatomy ultrasound  RTC 2 weeks  GERD=stable  Anxiety=stable  GCT today however patient did have bariatric surgery. She did this prior to seeing me today  Blood type: AB-    Margarita Calloway, DO

## 2020-05-01 LAB — T PALLIDUM AB SER QL: NONREACTIVE

## 2020-05-15 ENCOUNTER — PRENATAL OFFICE VISIT (OUTPATIENT)
Dept: OBGYN | Facility: CLINIC | Age: 35
End: 2020-05-15
Payer: COMMERCIAL

## 2020-05-15 VITALS
SYSTOLIC BLOOD PRESSURE: 116 MMHG | HEART RATE: 83 BPM | BODY MASS INDEX: 35.99 KG/M2 | DIASTOLIC BLOOD PRESSURE: 75 MMHG | WEIGHT: 228.1 LBS

## 2020-05-15 DIAGNOSIS — O09.819 PREGNANCY RESULTING FROM IN VITRO FERTILIZATION, ANTEPARTUM: ICD-10-CM

## 2020-05-15 DIAGNOSIS — O26.899 RH NEGATIVE STATE IN ANTEPARTUM PERIOD: Primary | ICD-10-CM

## 2020-05-15 DIAGNOSIS — Z67.91 RH NEGATIVE STATE IN ANTEPARTUM PERIOD: Primary | ICD-10-CM

## 2020-05-15 DIAGNOSIS — Z23 NEED FOR VACCINATION: ICD-10-CM

## 2020-05-15 DIAGNOSIS — O09.529 ANTEPARTUM MULTIGRAVIDA OF ADVANCED MATERNAL AGE: ICD-10-CM

## 2020-05-15 PROCEDURE — 96372 THER/PROPH/DIAG INJ SC/IM: CPT | Performed by: OBSTETRICS & GYNECOLOGY

## 2020-05-15 PROCEDURE — 90471 IMMUNIZATION ADMIN: CPT | Performed by: OBSTETRICS & GYNECOLOGY

## 2020-05-15 PROCEDURE — 90715 TDAP VACCINE 7 YRS/> IM: CPT | Performed by: OBSTETRICS & GYNECOLOGY

## 2020-05-15 PROCEDURE — 99207 ZZC COMPLICATED OB VISIT: CPT | Performed by: OBSTETRICS & GYNECOLOGY

## 2020-05-15 NOTE — NURSING NOTE
Clinic Administered Medication Documentation      Injectable Medication Documentation    Patient was given Rhogam. Prior to medication administration, verified patients identity using patient s name and date of birth. Please see MAR and medication order for additional information. Patient instructed to remain in clinic for 15 minutes and report any adverse reaction to staff immediately .      Was entire vial of medication used? Yes  Vial/Syringe: Syringe  Expiration Date:  02/07/2022  Was this medication supplied by the patient? No  Lot: [id number]  Right Gluteus   NDC: 8382-0386-33    Ronna Saenz MA on 5/15/2020 at 7:55 AM

## 2020-05-15 NOTE — PROGRESS NOTES
Presents for routine  appointment.     No complaints.  Some unintentional weight loss.  Some nausea and vomiting, better now for the past week.  No abnormal discharge , no leaking fluid , no contractions , no vaginal bleeding    ROS:   and GI  negative.     Please see Prenatal Vitals and Notes Flowsheet for objective data.  Hemoglobin   Date Value Ref Range Status   2020 12.1 11.7 - 15.7 g/dL Final     Lab Results   Component Value Date    ABO AB 2020    RH Neg 2020       A/P:  34 year old  at 28w0d       ICD-10-CM    1. Rh negative state in antepartum period  O26.899 rho(D) immune globulin (RHOGAM) injection 300 mcg    Z67.91    2. Antepartum multigravida of advanced maternal age  O09.529    3. Pregnancy resulting from in vitro fertilization, antepartum  O09.819        MFM US for growth next Wednesday   GCT Normal  TDAP today.    GERD=stable  Anxiety=stable  Rhogam: today  Discussed kick counts   Follow up in 2 weeks.      Ronna Cheung MD

## 2020-05-15 NOTE — NURSING NOTE
Prior to immunization administration, verified patients identity using patient s name and date of birth. Please see Immunization Activity for additional information.     Screening Questionnaire for Adult Immunization    Are you sick today?   No   Do you have allergies to medications, food, a vaccine component or latex?   No   Have you ever had a serious reaction after receiving a vaccination?   No   Do you have a long-term health problem with heart, lung, kidney, or metabolic disease (e.g., diabetes), asthma, a blood disorder, no spleen, complement component deficiency, a cochlear implant, or a spinal fluid leak?  Are you on long-term aspirin therapy?   No   Do you have cancer, leukemia, HIV/AIDS, or any other immune system problem?   No   Do you have a parent, brother, or sister with an immune system problem?   No   In the past 3 months, have you taken medications that affect  your immune system, such as prednisone, other steroids, or anticancer drugs; drugs for the treatment of rheumatoid arthritis, Crohn s disease, or psoriasis; or have you had radiation treatments?   No   Have you had a seizure, or a brain or other nervous system problem?   No   During the past year, have you received a transfusion of blood or blood    products, or been given immune (gamma) globulin or antiviral drug?   No   For women: Are you pregnant or is there a chance you could become       pregnant during the next month?   Yes   Have you received any vaccinations in the past 4 weeks?   No     Immunization questionnaire was positive for at least one answer.  Notified .        Per orders of Dr. Cheung, injection of Tdap (Adacel) given by Ronna Saenz MA. Patient instructed to remain in clinic for 15 minutes afterwards, and to report any adverse reaction to me immediately.       Screening performed by Ronna Saenz MA on 5/15/2020 at 8:19 AM.

## 2020-05-15 NOTE — NURSING NOTE
"Chief Complaint   Patient presents with     Prenatal Care     28w0d       Initial /75 (BP Location: Right arm, Cuff Size: Adult Large)   Pulse 83   Wt 103.5 kg (228 lb 1.6 oz)   BMI 35.99 kg/m   Estimated body mass index is 35.99 kg/m  as calculated from the following:    Height as of 1/3/20: 1.695 m (5' 6.75\").    Weight as of this encounter: 103.5 kg (228 lb 1.6 oz).  BP completed using cuff size: regular        Ronna Saenz MA on 5/15/2020 at 7:46 AM        "

## 2020-05-20 ENCOUNTER — TRANSFERRED RECORDS (OUTPATIENT)
Dept: HEALTH INFORMATION MANAGEMENT | Facility: CLINIC | Age: 35
End: 2020-05-20

## 2020-05-28 ENCOUNTER — VIRTUAL VISIT (OUTPATIENT)
Dept: OBGYN | Facility: CLINIC | Age: 35
End: 2020-05-28
Payer: COMMERCIAL

## 2020-05-28 DIAGNOSIS — O09.529 ANTEPARTUM MULTIGRAVIDA OF ADVANCED MATERNAL AGE: ICD-10-CM

## 2020-05-28 DIAGNOSIS — O09.819 PREGNANCY RESULTING FROM IN VITRO FERTILIZATION, ANTEPARTUM: Primary | ICD-10-CM

## 2020-05-28 PROCEDURE — 99207 ZZC COMPLICATED OB VISIT: CPT | Mod: TEL | Performed by: OBSTETRICS & GYNECOLOGY

## 2020-05-28 NOTE — PROGRESS NOTES
"Clarissa Pedroza is a 34 year old female who is being evaluated via a billable telephone visit.      The patient has been notified of following:     \"This telephone visit will be conducted via a call between you and your physician/provider. We have found that certain health care needs can be provided without the need for a physical exam.  This service lets us provide the care you need with a short phone conversation.  If a prescription is necessary we can send it directly to your pharmacy.  If lab work is needed we can place an order for that and you can then stop by our lab to have the test done at a later time.    Telephone visits are billed at different rates depending on your insurance coverage. During this emergency period, for some insurers they may be billed the same as an in-person visit.  Please reach out to your insurance provider with any questions.    If during the course of the call the physician/provider feels a telephone visit is not appropriate, you will not be charged for this service.\"    Patient has given verbal consent for Telephone visit?  Yes    What phone number would you like to be contacted at? [phone]    How would you like to obtain your AVS? Karla       Presents for routine  appointment.     No complaints.    No abnormal discharge , no leaking fluid , no contractions , no vaginal bleeding    ROS:   and GI  negative.     Please see Prenatal Vitals and Notes Flowsheet for objective data.  Hemoglobin   Date Value Ref Range Status   2020 12.1 11.7 - 15.7 g/dL Final     Lab Results   Component Value Date    ABO AB 2020    RH Neg 2020       A/P:  34 year old  at 29w6d       ICD-10-CM    1. Pregnancy resulting from in vitro fertilization, antepartum  O09.819    2. Antepartum multigravida of advanced maternal age  O09.529        MFM US from  reviewed - plan repeat there in 6 weeks. Normal growth.    TDAP given last visit .  "   GERD=stable  Anxiety=stable  Follow up in 2 weeks.      Ronna Cheung MD       Phone call duration: 6 minutes

## 2020-06-03 ENCOUNTER — TELEPHONE (OUTPATIENT)
Dept: OBGYN | Facility: CLINIC | Age: 35
End: 2020-06-03

## 2020-06-03 NOTE — TELEPHONE ENCOUNTER
, 30w5d.  Pt states that this morning when she sat down on her bed she experienced a sharp, stabbing pain low on the left lower quadrant of her abdomen.  She states that she turned to lay on her right side and the pain improved but when she laid on her left side the pain returned.  This pain lasted for about 3 minutes and eventually went away and hasn't happened since.  Pt denies vaginal bleeding, denies belly tight/firm when this pain occurred, vaginal itching/burning or a foul smelling discharge, dysuria.  Pt states she has a home doppler and baby's heart is beating normally and she is moving and kicking as normal.  Pt has not had a BM in 2 days.    I advised that since the pain is gone and pt has no other symptoms that she continue to monitor and if the pain comes back again but does not improve or go away with position change or other symptoms accompany it then to call us back.  I suggested it could be round ligament pain since she felt it on one side when she was changing positions and it went away when she changed positions, or it could be baby was in a position that was pressing on something that caused that pain and moved out of that position or it could be constipation since she has no other symptoms and has not had a BM in 2 days.  I advised that she increase her water intake to  ounces a day, increase her dietary fiber intake, limit dairy and take a daily stool softener, Colace.  Advised to call back in no BM by Friday.    Pt verbalized understanding and agreed to plan.    Julisa Loco RN

## 2020-06-07 DIAGNOSIS — K21.9 GERD WITHOUT ESOPHAGITIS: ICD-10-CM

## 2020-06-10 ENCOUNTER — MYC REFILL (OUTPATIENT)
Dept: FAMILY MEDICINE | Facility: CLINIC | Age: 35
End: 2020-06-10

## 2020-06-10 DIAGNOSIS — K21.9 GERD WITHOUT ESOPHAGITIS: ICD-10-CM

## 2020-06-10 RX ORDER — FAMOTIDINE 40 MG/1
40 TABLET, FILM COATED ORAL 2 TIMES DAILY PRN
Qty: 180 TABLET | Refills: 0 | Status: SHIPPED | OUTPATIENT
Start: 2020-06-10 | End: 2020-07-10

## 2020-06-10 NOTE — TELEPHONE ENCOUNTER
"H2 Blockers Protocol Passed     Rerun Protocol  (6/10/2020 7:23 AM)       Patient is age 12 or older         Recent (12 mo) or future (30 days) visit within the authorizing provider's specialty     Patient has had an office visit with the authorizing provider or a provider within the authorizing providers department within the previous 12 mos or has a future within next 30 days. See \"Patient Info\" tab in inbasket, or \"Choose Columns\" in Meds & Orders section of the refill encounter.              Medication is active on med list        Pt last seen 5/28/2020 for VV. Next appt 6/17/2020.    Last prescribed on 3/13/2020 for 180 tablets with 0 refills to take 1 tablet by mouth 2 times daily as needed for heartburn.    Prescription approved per [facility] Refill Protocol.    RN sending refill.    Evie Samuel RN on 6/10/2020 at 8:28 AM            "

## 2020-06-12 RX ORDER — FAMOTIDINE 40 MG/1
40 TABLET, FILM COATED ORAL 2 TIMES DAILY PRN
Qty: 180 TABLET | Refills: 0 | Status: SHIPPED | OUTPATIENT
Start: 2020-06-12 | End: 2020-09-15

## 2020-06-17 ENCOUNTER — PRENATAL OFFICE VISIT (OUTPATIENT)
Dept: OBGYN | Facility: CLINIC | Age: 35
End: 2020-06-17
Payer: COMMERCIAL

## 2020-06-17 VITALS
DIASTOLIC BLOOD PRESSURE: 76 MMHG | SYSTOLIC BLOOD PRESSURE: 119 MMHG | HEART RATE: 78 BPM | BODY MASS INDEX: 35.98 KG/M2 | OXYGEN SATURATION: 98 % | WEIGHT: 228 LBS

## 2020-06-17 DIAGNOSIS — O09.819 PREGNANCY RESULTING FROM IN VITRO FERTILIZATION, ANTEPARTUM: ICD-10-CM

## 2020-06-17 PROBLEM — O00.109 TUBAL ECTOPIC PREGNANCY: Status: RESOLVED | Noted: 2019-07-09 | Resolved: 2020-06-17

## 2020-06-17 PROBLEM — K80.20 SYMPTOMATIC CHOLELITHIASIS: Status: RESOLVED | Noted: 2018-08-22 | Resolved: 2020-06-17

## 2020-06-17 PROBLEM — Z31.83 IN VITRO FERTILIZATION: Status: RESOLVED | Noted: 2019-07-02 | Resolved: 2020-06-17

## 2020-06-17 PROCEDURE — 99207 ZZC PRENATAL VISIT: CPT | Performed by: OBSTETRICS & GYNECOLOGY

## 2020-06-17 NOTE — PATIENT INSTRUCTIONS
If you have any questions regarding your visit, Please contact your care team.     [facility]: [phone]  [facility] CLINIC HOURS TELEPHONE NUMBER       Noah Lara M.D.      Teresa Avery-Medical Assistant    [place]-  [place]-     Monday-[place]  8:00a.m-4:45 p.m  Tuesday-[place]  9:00a.m-4:00 p.m  Wednesday-[place] 8:00a.m-4:45 p.m.  Thursday-[place]  8:00a.m-4:45 p.m.  Friday-[place]  8:00a.m-4:45 p.m. [facility]  [address]  [place], [place] [address] [phone] ask [facility]  [phone] Fax  Imaging [phone]    [facility] Labor and Delivery  [address]  [place], [place] [address] [phone]    [facility]  [address] [facility] [place] [address] [phone] [facility]  [phone] Fax  Imaging [phone]     Urgent Care locations:    [facility] Monday-Friday  5 pm - 9 pm  Saturday and Sunday   9 am - 5 pm  Monday-Friday   11 am - 9 pm  Saturday and Sunday   9 am - 5 pm   [phone]   If you need a medication refill, please contact your pharmacy. Please allow 3 business days for your refill to be completed.  As always, Thank you for trusting us with your healthcare needs!

## 2020-06-18 NOTE — PROGRESS NOTES
No significant signs, symptoms or concerns except occasional nausea and vomiting.    Total encounter time (physician together with patient) = 15min. Direct counseling, education and care coordination time (physician together with patient) = 10min. This counseling included the following: Advice appropriate to gestational age reviewed including pertinent Checklist items. Discussed condition, tests and care plan including risks, benefits, and alternatives. Problem list updated.   A/P:  Clarissa was seen today for prenatal care.    Diagnoses and all orders for this visit:    Pregnancy resulting from in vitro fertilization, antepartum        Noah Lara MD

## 2020-07-01 ENCOUNTER — TRANSFERRED RECORDS (OUTPATIENT)
Dept: HEALTH INFORMATION MANAGEMENT | Facility: CLINIC | Age: 35
End: 2020-07-01

## 2020-07-02 ENCOUNTER — PRENATAL OFFICE VISIT (OUTPATIENT)
Dept: OBGYN | Facility: CLINIC | Age: 35
End: 2020-07-02
Payer: COMMERCIAL

## 2020-07-02 VITALS
SYSTOLIC BLOOD PRESSURE: 113 MMHG | WEIGHT: 228.7 LBS | DIASTOLIC BLOOD PRESSURE: 78 MMHG | BODY MASS INDEX: 36.09 KG/M2 | HEART RATE: 82 BPM

## 2020-07-02 DIAGNOSIS — O09.523 MULTIGRAVIDA OF ADVANCED MATERNAL AGE IN THIRD TRIMESTER: ICD-10-CM

## 2020-07-02 DIAGNOSIS — Z31.83 IN VITRO FERTILIZATION: Primary | ICD-10-CM

## 2020-07-02 PROCEDURE — 99207 ZZC PRENATAL VISIT: CPT | Performed by: OBSTETRICS & GYNECOLOGY

## 2020-07-02 NOTE — PROGRESS NOTES
34w6d   No HA, visual changes, N/V etc. She had MFM visit yesterday. See bellow. RTC 2 wk/prn.  MFMImpression  =========      Patient here for a fetal growth scan secondary to a diagnosis of history of gastric sleeve surgery. She is at 34w5d gestational age.    Active single fetus with behavior appropriate for gestational age.    Appropriate interval fetal growth.    Estimated fetal weight is appropriate for gestational age.    None of the anomalies commonly detected by ultrasound were evident in the limited fetal anatomic survey described above.    Normal amniotic fluid volume.    Recommendation  ==============      We discussed the findings on today's ultrasound with the patient.    Further ultrasound studies as clinically indicated.    Return to primary provider for continued prenatal care.    Thank you for the opportunity to participate in the care of this patient. If you have questions regarding today's evaluation or if we can be of further service, please contact the  [facility].    **Fetal anomalies may be present but not detected*    REPORT SIGNED BY: CARMEN ALTAMIRANO MD      ICD-10-CM    1. In vitro fertilization  Z31.83    2. Multigravida of advanced maternal age in third trimester  O09.523      CEPHAS AGBEH, MD.

## 2020-07-02 NOTE — PATIENT INSTRUCTIONS
If you have any questions regarding your visit, Please contact your care team.  [facility]: [phone]  [facility] CLINIC HOURS TELEPHONE NUMBER   Cephas Agbeh, M.D.      Teresa Devine-  [place]-         Monday-[place]    8:00a.m-4:45 p.m    Tuesday--[place]     8:00a.m-4:45 p.m.    Thursday-[place]    8:00a.m-4:45 p.m.    Friday-[place]    8:00a.m-4:45 p.m    [facility]   [address] [place] [address]   [phone]-Ask for [facility]   Fax [phone]   Brogmlr-[phone]     [facility] Labor and Delivery   [address] [place] [address] [place] [address]   [phone]    [facility]  [address] [address]  [phone]  Fcjusks-[phone]   Urgent Care locations:    [facility] Monday-Friday  5 pm - 9 pm  Saturday and Sunday   9 am - 5 pm   Monday-Friday   5 pm - 9 pm  Saturday and Sunday  9 am - 5 pm    [phone]   If you need a medication refill, please contact your pharmacy. Please allow 3 business days for your refill to be completed.  As always, Thank you for trusting us with your healthcare needs!

## 2020-07-10 ENCOUNTER — PRENATAL OFFICE VISIT (OUTPATIENT)
Dept: OBGYN | Facility: CLINIC | Age: 35
End: 2020-07-10
Payer: COMMERCIAL

## 2020-07-10 VITALS
WEIGHT: 230.1 LBS | BODY MASS INDEX: 36.31 KG/M2 | OXYGEN SATURATION: 98 % | DIASTOLIC BLOOD PRESSURE: 79 MMHG | HEART RATE: 71 BPM | SYSTOLIC BLOOD PRESSURE: 123 MMHG

## 2020-07-10 DIAGNOSIS — O09.529 ANTEPARTUM MULTIGRAVIDA OF ADVANCED MATERNAL AGE: Primary | ICD-10-CM

## 2020-07-10 DIAGNOSIS — O09.819 PREGNANCY RESULTING FROM IN VITRO FERTILIZATION, ANTEPARTUM: ICD-10-CM

## 2020-07-10 PROCEDURE — 87653 STREP B DNA AMP PROBE: CPT | Performed by: OBSTETRICS & GYNECOLOGY

## 2020-07-10 PROCEDURE — 87186 SC STD MICRODIL/AGAR DIL: CPT | Performed by: OBSTETRICS & GYNECOLOGY

## 2020-07-10 PROCEDURE — 99207 ZZC PRENATAL VISIT: CPT | Performed by: OBSTETRICS & GYNECOLOGY

## 2020-07-10 RX ORDER — DOCUSATE SODIUM 100 MG/1
100 CAPSULE, LIQUID FILLED ORAL 2 TIMES DAILY
COMMUNITY
End: 2022-12-16

## 2020-07-10 NOTE — PROGRESS NOTES
34 year old  at 36w0d weeks presents to the clinic for a routine prenatal visit.  No concerns.  No vaginal bleeding, leakage of fluid, or contractions  IVF=last ultrasound with MFM was normal on   Fundal height=37cm  AORu=383  CX=Cl/20/-3  GBS done today  Anxiety=stable  GERD=stable  Labor precautions discussed  RTC 1 week    Margarita Calloway DO

## 2020-07-10 NOTE — PATIENT INSTRUCTIONS
What to watch out for are: regular contractions every 5 min, vaginal bleeding, decreased fetal movement, or leakage of fluid.  Please call the office or go to L&D if you develop any of these signs and symptoms.      I will see you for your follow up appointment.  Please feel free to call if you have any questions or concerns.      Thanks,  Margarita Calloway, DO

## 2020-07-11 LAB
GP B STREP DNA SPEC QL NAA+PROBE: POSITIVE
SPECIMEN SOURCE: ABNORMAL

## 2020-07-13 PROBLEM — O99.820 GBS (GROUP B STREPTOCOCCUS CARRIER), +RV CULTURE, CURRENTLY PREGNANT: Status: ACTIVE | Noted: 2020-07-13

## 2020-07-14 LAB
BACTERIA SPEC CULT: ABNORMAL
SPECIMEN SOURCE: ABNORMAL

## 2020-07-17 ENCOUNTER — PRENATAL OFFICE VISIT (OUTPATIENT)
Dept: OBGYN | Facility: CLINIC | Age: 35
End: 2020-07-17
Payer: COMMERCIAL

## 2020-07-17 VITALS
SYSTOLIC BLOOD PRESSURE: 122 MMHG | HEART RATE: 75 BPM | DIASTOLIC BLOOD PRESSURE: 74 MMHG | BODY MASS INDEX: 36.26 KG/M2 | WEIGHT: 229.8 LBS | OXYGEN SATURATION: 99 %

## 2020-07-17 DIAGNOSIS — O09.819 PREGNANCY RESULTING FROM IN VITRO FERTILIZATION, ANTEPARTUM: Primary | ICD-10-CM

## 2020-07-17 DIAGNOSIS — O99.820 GBS (GROUP B STREPTOCOCCUS CARRIER), +RV CULTURE, CURRENTLY PREGNANT: ICD-10-CM

## 2020-07-17 DIAGNOSIS — O09.529 ANTEPARTUM MULTIGRAVIDA OF ADVANCED MATERNAL AGE: ICD-10-CM

## 2020-07-17 PROCEDURE — 99207 ZZC PRENATAL VISIT: CPT | Performed by: OBSTETRICS & GYNECOLOGY

## 2020-07-17 NOTE — PROGRESS NOTES
34 year old  at 37w0d weeks presents to the clinic for a routine prenatal visit.  No concerns.  Complains of feeling more pressure.  No vaginal bleeding, leakage of fluid, or contractions   Fundal height=38cm  AVBm=996  CX=Cl/20/-3  Anxiety=stable  GERD=stable  Discussed labor precautions  RTC 1 week  GBS=Positive    Margarita Calloway DO

## 2020-07-24 ENCOUNTER — PRENATAL OFFICE VISIT (OUTPATIENT)
Dept: OBGYN | Facility: CLINIC | Age: 35
End: 2020-07-24
Payer: COMMERCIAL

## 2020-07-24 VITALS
SYSTOLIC BLOOD PRESSURE: 119 MMHG | WEIGHT: 231.2 LBS | OXYGEN SATURATION: 99 % | DIASTOLIC BLOOD PRESSURE: 80 MMHG | HEART RATE: 88 BPM | BODY MASS INDEX: 36.48 KG/M2

## 2020-07-24 DIAGNOSIS — O09.819 PREGNANCY RESULTING FROM IN VITRO FERTILIZATION, ANTEPARTUM: Primary | ICD-10-CM

## 2020-07-24 DIAGNOSIS — O99.820 GBS (GROUP B STREPTOCOCCUS CARRIER), +RV CULTURE, CURRENTLY PREGNANT: ICD-10-CM

## 2020-07-24 DIAGNOSIS — O09.529 ANTEPARTUM MULTIGRAVIDA OF ADVANCED MATERNAL AGE: ICD-10-CM

## 2020-07-24 PROCEDURE — 99207 ZZC PRENATAL VISIT: CPT | Performed by: OBSTETRICS & GYNECOLOGY

## 2020-07-24 NOTE — PROGRESS NOTES
34 year old  at 38w0d weeks presents to the clinic for a routine prenatal visit.  No concerns.  Complains of feeling more pressure.  No vaginal bleeding, leakage of fluid, or contractions   Fundal height=39cm  IBVy=880's  CX=Cl/50/-3  Discussed labor precautions  RTC 1 week  Anxiety=stable  GERD=stable  GBS=Positive    Margarita Calloway DO

## 2020-07-28 ENCOUNTER — PRENATAL OFFICE VISIT (OUTPATIENT)
Dept: OBGYN | Facility: CLINIC | Age: 35
End: 2020-07-28
Payer: COMMERCIAL

## 2020-07-28 VITALS
WEIGHT: 230.2 LBS | HEART RATE: 84 BPM | SYSTOLIC BLOOD PRESSURE: 127 MMHG | BODY MASS INDEX: 36.32 KG/M2 | OXYGEN SATURATION: 98 % | DIASTOLIC BLOOD PRESSURE: 82 MMHG

## 2020-07-28 DIAGNOSIS — O99.820 GBS (GROUP B STREPTOCOCCUS CARRIER), +RV CULTURE, CURRENTLY PREGNANT: ICD-10-CM

## 2020-07-28 DIAGNOSIS — O09.529 ANTEPARTUM MULTIGRAVIDA OF ADVANCED MATERNAL AGE: ICD-10-CM

## 2020-07-28 DIAGNOSIS — O09.819 PREGNANCY RESULTING FROM IN VITRO FERTILIZATION, ANTEPARTUM: Primary | ICD-10-CM

## 2020-07-28 PROCEDURE — 99207 ZZC PRENATAL VISIT: CPT | Performed by: OBSTETRICS & GYNECOLOGY

## 2020-07-28 NOTE — PROGRESS NOTES
34 year old  at 38w4d weeks presents to the clinic for a routine prenatal visit.  No concerns.  Complains of feeling more pressure.  No vaginal bleeding, leakage of fluid, or contractions   Fundal height=39cm  DKEg=146  CX=Cl/50/-3  Discussed labor precautions  RTC 1 week  Anxiety=stable  GERD=stable  GBS=Positive  I discussed with MFM regarding timing of delivery.  No indication at this time.      Margarita Calloway,

## 2020-08-03 ENCOUNTER — PRENATAL OFFICE VISIT (OUTPATIENT)
Dept: OBGYN | Facility: CLINIC | Age: 35
End: 2020-08-03
Payer: COMMERCIAL

## 2020-08-03 ENCOUNTER — MYC MEDICAL ADVICE (OUTPATIENT)
Dept: OBGYN | Facility: CLINIC | Age: 35
End: 2020-08-03

## 2020-08-03 VITALS
SYSTOLIC BLOOD PRESSURE: 133 MMHG | BODY MASS INDEX: 36.37 KG/M2 | HEART RATE: 85 BPM | WEIGHT: 230.5 LBS | OXYGEN SATURATION: 99 % | DIASTOLIC BLOOD PRESSURE: 87 MMHG

## 2020-08-03 DIAGNOSIS — O09.529 ANTEPARTUM MULTIGRAVIDA OF ADVANCED MATERNAL AGE: ICD-10-CM

## 2020-08-03 DIAGNOSIS — O99.820 GBS (GROUP B STREPTOCOCCUS CARRIER), +RV CULTURE, CURRENTLY PREGNANT: ICD-10-CM

## 2020-08-03 DIAGNOSIS — O09.819 PREGNANCY RESULTING FROM IN VITRO FERTILIZATION, ANTEPARTUM: Primary | ICD-10-CM

## 2020-08-03 PROCEDURE — 99207 ZZC COMPLICATED OB VISIT: CPT | Performed by: OBSTETRICS & GYNECOLOGY

## 2020-08-03 NOTE — PROGRESS NOTES
34 year old  at 39w3d weeks presents to the clinic for a routine prenatal visit.  No concerns.  Complains of feeling more pressure.  No vaginal bleeding, leakage of fluid, or contractions   Fundal height=40cm  EYMa=372  CX=/-3  Anxiety=stable  GERD=stable  Discussed labor precautions  GBS=Positive  We discussed induction given all of her complains of morbidities.  I recommended tonight however L&D is full I was told by the Charge RN.  Patient will talk to her  and let me know about tomorrow night.    Margarita Calloway, DO

## 2020-08-04 NOTE — TELEPHONE ENCOUNTER
I called and spoke to patient regarding induction.    Discussed with Clarissa the risks, benefits and alternatives to induction.  We discussed cervical ripening for those patients with a dwyer score of less than 6 (for multiparous) and 8 (for nulliparous).  Discussed the concerns related to uterine hyperstimulation and adverse fetal effects that can occur with a ripening agent or pitocin. Discussed amniotomy and the rationale for it with induction.  I discussed the expected timeline for her based on her presentation, but she understands that this is just an approximate.  She is given the opportunity to ask questions and have them answered.  She does wish to proceed    Induction scheduled for August 4.    Margarita Calloway, DO

## 2020-08-04 NOTE — TELEPHONE ENCOUNTER
, 39w4d.  Last prenatal visit was yesterday, 8/3.  Per OV plan:  We discussed induction given all of her complains of morbidities.  I recommended tonight however L&D is full I was told by the Charge RN.  Patient will talk to her  and let me know about tomorrow night.    Will route to Dr. Calloway to address pt's mychart message.    Julisa Loco RN

## 2020-08-13 ENCOUNTER — MYC MEDICAL ADVICE (OUTPATIENT)
Dept: OBGYN | Facility: CLINIC | Age: 35
End: 2020-08-13

## 2020-08-13 ENCOUNTER — PRENATAL OFFICE VISIT (OUTPATIENT)
Dept: OBGYN | Facility: CLINIC | Age: 35
End: 2020-08-13
Payer: COMMERCIAL

## 2020-08-13 VITALS
BODY MASS INDEX: 33.69 KG/M2 | OXYGEN SATURATION: 99 % | SYSTOLIC BLOOD PRESSURE: 125 MMHG | DIASTOLIC BLOOD PRESSURE: 83 MMHG | WEIGHT: 213.5 LBS | HEART RATE: 73 BPM

## 2020-08-13 PROCEDURE — 99212 OFFICE O/P EST SF 10 MIN: CPT | Mod: 24 | Performed by: OBSTETRICS & GYNECOLOGY

## 2020-08-13 NOTE — PROGRESS NOTES
She presents today 1 week s/p  on 2020 and wants to make sure her lower extremity edema and heart rate are normal.  She did have a very mild headache upon awakening this morning but did not take any Tylenol since it resolved on its own.  She is breastfeeding without difficulty and feels good.  Her weight has dropped 17 lbs and she is hoping to continue losing since she is edematous.  Her BP is 125/83 and she is +1 non pitting edema of the lower extremities.  Hrt - RRR without murmur and lungs are CTAB.  Will have her make her 6-week postpartum exam appt or earlier prn.

## 2020-09-15 ENCOUNTER — PRENATAL OFFICE VISIT (OUTPATIENT)
Dept: OBGYN | Facility: CLINIC | Age: 35
End: 2020-09-15
Payer: COMMERCIAL

## 2020-09-15 VITALS
HEART RATE: 77 BPM | DIASTOLIC BLOOD PRESSURE: 77 MMHG | SYSTOLIC BLOOD PRESSURE: 118 MMHG | WEIGHT: 209.8 LBS | BODY MASS INDEX: 33.11 KG/M2 | OXYGEN SATURATION: 100 %

## 2020-09-15 DIAGNOSIS — Z30.011 ENCOUNTER FOR INITIAL PRESCRIPTION OF CONTRACEPTIVE PILLS: ICD-10-CM

## 2020-09-15 PROBLEM — O09.819 PREGNANCY RESULTING FROM IN VITRO FERTILIZATION, ANTEPARTUM: Status: RESOLVED | Noted: 2020-01-03 | Resolved: 2020-09-15

## 2020-09-15 PROBLEM — Z67.91 RH NEGATIVE STATE IN ANTEPARTUM PERIOD: Status: RESOLVED | Noted: 2020-01-06 | Resolved: 2020-09-15

## 2020-09-15 PROBLEM — O09.529 ANTEPARTUM MULTIGRAVIDA OF ADVANCED MATERNAL AGE: Status: RESOLVED | Noted: 2020-01-03 | Resolved: 2020-09-15

## 2020-09-15 PROBLEM — O26.899 RH NEGATIVE STATE IN ANTEPARTUM PERIOD: Status: RESOLVED | Noted: 2020-01-06 | Resolved: 2020-09-15

## 2020-09-15 PROBLEM — O99.820 GBS (GROUP B STREPTOCOCCUS CARRIER), +RV CULTURE, CURRENTLY PREGNANT: Status: RESOLVED | Noted: 2020-07-13 | Resolved: 2020-09-15

## 2020-09-15 PROCEDURE — 99207 ZZC POST PARTUM EXAM: CPT | Performed by: OBSTETRICS & GYNECOLOGY

## 2020-09-15 RX ORDER — DOCUSATE SODIUM 100 MG/1
200 CAPSULE, LIQUID FILLED ORAL
COMMUNITY
Start: 2020-04-08 | End: 2020-09-15

## 2020-09-15 RX ORDER — FAMOTIDINE 40 MG/1
40 TABLET, FILM COATED ORAL
COMMUNITY
End: 2020-12-13

## 2020-09-15 RX ORDER — ACETAMINOPHEN AND CODEINE PHOSPHATE 120; 12 MG/5ML; MG/5ML
0.35 SOLUTION ORAL DAILY
Qty: 84 TABLET | Refills: 3 | Status: SHIPPED | OUTPATIENT
Start: 2020-09-15 | End: 2021-04-20

## 2020-09-15 NOTE — PROGRESS NOTES
Subjective  34 year old non-pregnant female presents today for a postpartum visit.  Patient had an  on 2020.  No pain.  No vaginal bleeding.  No problems urinating.  Normal bowel movements.  Patient is breast feeding.  No signs and symptoms of ppd.  Patient scored a 0 on the PHQ-9 and a 0 on the MONTEZ-7.  No thoughts of suicide or infanticide.  Patient is not due for a pap smear today.  Patient is wanting to do an oral contractive pill for birth control.  Will do Micronor because she is breastfeeding.  No recent intercourse.        ROS: 10 point ROS neg other than the symptoms noted above in the HPI.  Past Medical History:   Diagnosis Date     MONTEZ (generalized anxiety disorder) 3/28/2016     Gastroesophageal reflux disease      History of infertility 05/10/2017    IVF     Obese      Past Surgical History:   Procedure Laterality Date     ENT SURGERY      wisdom teeth     LAPAROSCOPIC CHOLECYSTECTOMY N/A 2018    Procedure: LAPAROSCOPIC CHOLECYSTECTOMY;  LAPAROSCOPIC CHOLECYSTECTOMY ;  Surgeon: Kenneth Jo MD;  Location: [facility]     LAPAROSCOPIC GASTRIC SLEEVE N/A 2017    Procedure: LAPAROSCOPIC GASTRIC SLEEVE;  LAPAROSCOPIC GASTRIC SLEEVE;  Surgeon: Sloan Burnette MD;  Location:  OR     ORTHOPEDIC SURGERY      Left elbow     Family History   Problem Relation Age of Onset     Depression/Anxiety Mother         Anxiety     Thyroid Disease Mother         Hypothyroid     Anxiety Disorder Mother      Thyroid Disease Mother         hypothyroid     Obesity Mother      Other Cancer Maternal Grandmother         Hodgkins Lymphoma     Obesity Maternal Grandmother      Prostate Cancer Paternal Grandfather      Anxiety Disorder Brother      Depression/Anxiety Sister         Anxiety     Anxiety Disorder Sister      Social History     Tobacco Use     Smoking status: Never Smoker     Smokeless tobacco: Never Used   Substance Use Topics     Alcohol use: Not Currently         Objective  Vitals: /77  (BP Location: Right arm, Cuff Size: Adult Regular)   Pulse 77   Wt 95.2 kg (209 lb 12.8 oz)   SpO2 100%   Breastfeeding Yes   BMI 33.11 kg/m    BMI= Body mass index is 33.11 kg/m .         Assessment  1.)  Post partum visit  2.)  S/p  on 2020  3.)  Birth control        Plan  1.)  Oral contractive pills ordered      Margarita Calloway

## 2020-09-15 NOTE — PATIENT INSTRUCTIONS
Please call if you any questions.    [facility]  [address] [place]   [address]  [phone]        Margarita Calloway,

## 2020-12-05 DIAGNOSIS — K21.9 GERD WITHOUT ESOPHAGITIS: Primary | ICD-10-CM

## 2020-12-06 ENCOUNTER — MYC REFILL (OUTPATIENT)
Dept: OBGYN | Facility: CLINIC | Age: 35
End: 2020-12-06

## 2020-12-06 ENCOUNTER — HEALTH MAINTENANCE LETTER (OUTPATIENT)
Age: 35
End: 2020-12-06

## 2020-12-06 DIAGNOSIS — Z30.011 ENCOUNTER FOR INITIAL PRESCRIPTION OF CONTRACEPTIVE PILLS: ICD-10-CM

## 2020-12-06 RX ORDER — ACETAMINOPHEN AND CODEINE PHOSPHATE 120; 12 MG/5ML; MG/5ML
0.35 SOLUTION ORAL DAILY
Qty: 84 TABLET | Refills: 3 | Status: CANCELLED | OUTPATIENT
Start: 2020-12-06

## 2020-12-07 NOTE — TELEPHONE ENCOUNTER
"Contraceptives Protocol Passed    Rerun Protocol (2020 5:40 PM)     Patient is not a current smoker if age is 35 or older       Recent (12 mo) or future (30 days) visit within the authorizing provider's specialty    Patient has had an office visit with the authorizing provider or a provider within the authorizing providers department within the previous 12 mos or has a future within next 30 days. See \"Patient Info\" tab in inbasket, or \"Choose Columns\" in Meds & Orders section of the refill encounter.             Medication is active on med list       No active pregnancy on record       No positive pregnancy test in past 12 months       Pt last seen in clinic 9/15/2020 for postpartum follow up.     Pt had  on 2020 and requesting oral contraceptives.    Prescription last sent for 84 tablets with 3 refills on 9/15/2020.  Pt should have refills remaining at pharmacy.    RN closing refill encounter and let pt know refills remain.  "

## 2020-12-11 NOTE — TELEPHONE ENCOUNTER
Routing refill request to provider for review/approval because:  Medication is reported/historical    Julisa Loco RN

## 2020-12-13 RX ORDER — FAMOTIDINE 40 MG/1
40 TABLET, FILM COATED ORAL 2 TIMES DAILY
Qty: 60 TABLET | Refills: 0 | Status: SHIPPED | OUTPATIENT
Start: 2020-12-13 | End: 2021-04-20

## 2020-12-27 ENCOUNTER — VIRTUAL VISIT (OUTPATIENT)
Dept: URGENT CARE | Facility: CLINIC | Age: 35
End: 2020-12-27
Payer: COMMERCIAL

## 2020-12-27 DIAGNOSIS — Z20.822 EXPOSURE TO COVID-19 VIRUS: Primary | ICD-10-CM

## 2021-01-11 DIAGNOSIS — K21.9 GERD WITHOUT ESOPHAGITIS: ICD-10-CM

## 2021-01-11 RX ORDER — FAMOTIDINE 40 MG/1
40 TABLET, FILM COATED ORAL 2 TIMES DAILY
Qty: 60 TABLET | Refills: 0 | Status: CANCELLED | OUTPATIENT
Start: 2021-01-11

## 2021-01-11 NOTE — TELEPHONE ENCOUNTER
"H2 Blockers Protocol Passed    Rerun Protocol (1/11/2021 2:43 PM)     Patient is age 12 or older       Recent (12 mo) or future (30 days) visit within the authorizing provider's specialty    Patient has had an office visit with the authorizing provider or a provider within the authorizing providers department within the previous 12 mos or has a future within next 30 days. See \"Patient Info\" tab in inbasket, or \"Choose Columns\" in Meds & Orders section of the refill encounter.             Medication is active on med list       Last seen 9/15/2020 for postpartum follow up.    Last prescribed 12/13/2020l with no refills.  Dr. Calloway asks pt to see FP provider or purchase more OTC if needed following pregnancy.    bluebottle[employer] message sent to patient to remind her of this as well.    Magdalena Barrios, CARLITAN RN    "

## 2021-01-19 ENCOUNTER — IMMUNIZATION (OUTPATIENT)
Dept: PEDIATRICS | Facility: CLINIC | Age: 36
End: 2021-01-19
Payer: COMMERCIAL

## 2021-01-19 PROCEDURE — 91300 PR COVID VAC PFIZER DIL RECON 30 MCG/0.3 ML IM: CPT

## 2021-01-19 PROCEDURE — 0001A PR COVID VAC PFIZER DIL RECON 30 MCG/0.3 ML IM: CPT

## 2021-02-09 ENCOUNTER — IMMUNIZATION (OUTPATIENT)
Dept: PEDIATRICS | Facility: CLINIC | Age: 36
End: 2021-02-09
Attending: OTOLARYNGOLOGY
Payer: COMMERCIAL

## 2021-02-09 PROCEDURE — 91300 PR COVID VAC PFIZER DIL RECON 30 MCG/0.3 ML IM: CPT

## 2021-02-09 PROCEDURE — 0002A PR COVID VAC PFIZER DIL RECON 30 MCG/0.3 ML IM: CPT

## 2021-04-17 ASSESSMENT — ENCOUNTER SYMPTOMS
SORE THROAT: 0
FREQUENCY: 0
DIZZINESS: 0
SHORTNESS OF BREATH: 0
HEMATURIA: 0
DIARRHEA: 0
FEVER: 0
JOINT SWELLING: 0
WEAKNESS: 0
BREAST MASS: 0
PARESTHESIAS: 0
DYSURIA: 0
PALPITATIONS: 0
CHILLS: 0
MYALGIAS: 0
NERVOUS/ANXIOUS: 0
HEMATOCHEZIA: 0
HEARTBURN: 0
NAUSEA: 0
HEADACHES: 0
ARTHRALGIAS: 0
COUGH: 0
ABDOMINAL PAIN: 0
CONSTIPATION: 0
EYE PAIN: 0

## 2021-04-20 ENCOUNTER — OFFICE VISIT (OUTPATIENT)
Dept: FAMILY MEDICINE | Facility: CLINIC | Age: 36
End: 2021-04-20
Payer: COMMERCIAL

## 2021-04-20 VITALS
TEMPERATURE: 98.2 F | HEIGHT: 67 IN | HEART RATE: 70 BPM | WEIGHT: 233.2 LBS | DIASTOLIC BLOOD PRESSURE: 81 MMHG | SYSTOLIC BLOOD PRESSURE: 121 MMHG | BODY MASS INDEX: 36.6 KG/M2

## 2021-04-20 DIAGNOSIS — F41.1 GENERALIZED ANXIETY DISORDER: ICD-10-CM

## 2021-04-20 DIAGNOSIS — Z13.220 LIPID SCREENING: ICD-10-CM

## 2021-04-20 DIAGNOSIS — Z00.00 ROUTINE GENERAL MEDICAL EXAMINATION AT A HEALTH CARE FACILITY: Primary | ICD-10-CM

## 2021-04-20 LAB
CHOLEST SERPL-MCNC: 151 MG/DL
GLUCOSE SERPL-MCNC: 86 MG/DL (ref 70–99)
HDLC SERPL-MCNC: 55 MG/DL
LDLC SERPL CALC-MCNC: 74 MG/DL
NONHDLC SERPL-MCNC: 96 MG/DL
TRIGL SERPL-MCNC: 112 MG/DL

## 2021-04-20 PROCEDURE — 36415 COLL VENOUS BLD VENIPUNCTURE: CPT | Performed by: PREVENTIVE MEDICINE

## 2021-04-20 PROCEDURE — 82947 ASSAY GLUCOSE BLOOD QUANT: CPT | Performed by: PREVENTIVE MEDICINE

## 2021-04-20 PROCEDURE — 99395 PREV VISIT EST AGE 18-39: CPT | Performed by: PREVENTIVE MEDICINE

## 2021-04-20 PROCEDURE — 80061 LIPID PANEL: CPT | Performed by: PREVENTIVE MEDICINE

## 2021-04-20 PROCEDURE — 99213 OFFICE O/P EST LOW 20 MIN: CPT | Mod: 25 | Performed by: PREVENTIVE MEDICINE

## 2021-04-20 PROCEDURE — 96127 BRIEF EMOTIONAL/BEHAV ASSMT: CPT | Mod: 59 | Performed by: PREVENTIVE MEDICINE

## 2021-04-20 RX ORDER — ESCITALOPRAM OXALATE 10 MG/1
10 TABLET ORAL DAILY
Qty: 90 TABLET | Refills: 1 | Status: SHIPPED | OUTPATIENT
Start: 2021-04-20 | End: 2021-10-20

## 2021-04-20 ASSESSMENT — ENCOUNTER SYMPTOMS
BREAST MASS: 0
ARTHRALGIAS: 0
WEAKNESS: 0
HEMATURIA: 0
FREQUENCY: 0
PALPITATIONS: 0
COUGH: 0
DYSURIA: 0
FEVER: 0
NAUSEA: 0
HEMATOCHEZIA: 0
HEADACHES: 0
EYE PAIN: 0
CONSTIPATION: 0
CHILLS: 0
HEARTBURN: 0
PARESTHESIAS: 0
NERVOUS/ANXIOUS: 0
MYALGIAS: 0
ABDOMINAL PAIN: 0
SHORTNESS OF BREATH: 0
JOINT SWELLING: 0
DIZZINESS: 0
SORE THROAT: 0
DIARRHEA: 0

## 2021-04-20 ASSESSMENT — ANXIETY QUESTIONNAIRES
3. WORRYING TOO MUCH ABOUT DIFFERENT THINGS: SEVERAL DAYS
IF YOU CHECKED OFF ANY PROBLEMS ON THIS QUESTIONNAIRE, HOW DIFFICULT HAVE THESE PROBLEMS MADE IT FOR YOU TO DO YOUR WORK, TAKE CARE OF THINGS AT HOME, OR GET ALONG WITH OTHER PEOPLE: SOMEWHAT DIFFICULT
2. NOT BEING ABLE TO STOP OR CONTROL WORRYING: NOT AT ALL
GAD7 TOTAL SCORE: 4
1. FEELING NERVOUS, ANXIOUS, OR ON EDGE: SEVERAL DAYS
5. BEING SO RESTLESS THAT IT IS HARD TO SIT STILL: NOT AT ALL
6. BECOMING EASILY ANNOYED OR IRRITABLE: SEVERAL DAYS
7. FEELING AFRAID AS IF SOMETHING AWFUL MIGHT HAPPEN: NOT AT ALL

## 2021-04-20 ASSESSMENT — PATIENT HEALTH QUESTIONNAIRE - PHQ9
5. POOR APPETITE OR OVEREATING: SEVERAL DAYS
SUM OF ALL RESPONSES TO PHQ QUESTIONS 1-9: 0

## 2021-04-20 ASSESSMENT — MIFFLIN-ST. JEOR: SCORE: 1772.48

## 2021-04-20 NOTE — PATIENT INSTRUCTIONS
At [facility], we strive to deliver an exceptional experience to you, every time we see you. If you receive a survey, please complete it as we do value your feedback.  If you have MyChart, you can expect to receive results automatically within 24 hours of their completion.  Your provider will send a note interpreting your results as well.   If you do not have MyChart, you should receive your results in about a week by mail.    Your care team:                            Family Medicine Internal Medicine   MD Ankur Hogue MD Shantel Branch-Fleming, MD Srinivasa Vaka, MD Katya Belousova, PANELLA Vang, APRN CNP    Venkata Dykes, MD Pediatrics   Rusty Donovan, PANELLA Elise, CNP MD Gabriela Rosario APRN CNP   MD Lynette Howard MD Deborah Mielke, MD Gela Watson, APRN [facility]      Clinic hours: Monday - Thursday 7 am-6 pm; Fridays 7 am-5 pm.   Urgent care: Monday - Friday 10 am- 8 pm; Saturday and Sunday 9 am-5 pm.    Clinic: [phone]       [facility]: Monday - Thursday 8 am - 7 pm; Friday 8 am - 6 pm  [facility]: [phone]     Use www.oncare.org for 24/7 diagnosis and treatment of dozens of conditions.      Drugs of the SSRI class can have side effects such as weight gain, sexual dysfunction, insomnia, headache, nausea. These medications are generally effective at alleviating symptoms of anxiety and/or depression. Let me know if significant side effects do occur.

## 2021-04-20 NOTE — PROGRESS NOTES
SUBJECTIVE:   CC: Clarissa Pedroza is an 35 year old woman who presents for preventive health visit.       Patient has been advised of split billing requirements and indicates understanding: Yes  Healthy Habits:     Getting at least 3 servings of Calcium per day:  Yes    Bi-annual eye exam:  Yes    Dental care twice a year:  Yes    Sleep apnea or symptoms of sleep apnea:  None    Diet:  Regular (no restrictions)    Frequency of exercise:  4-5 days/week    Duration of exercise:  15-30 minutes    Taking medications regularly:  Yes    Medication side effects:  None    PHQ-2 Total Score: 0    Additional concerns today:  No        Anxiety Follow-Up    How are you doing with your anxiety since your last visit? Increased stress with new baby and pandemic     Are you having other symptoms that might be associated with anxiety? No    Have you had a significant life event? OTHER: baby     Are you feeling depressed? No    Do you have any concerns with your use of alcohol or other drugs? No     Had been on Lexapro in the past, would like to restart, taken without side effects     Social History     Tobacco Use     Smoking status: Never Smoker     Smokeless tobacco: Never Used   Substance Use Topics     Alcohol use: Not Currently     Drug use: No     MONTEZ-7 SCORE 1/31/2018 4/20/2021   Total Score 0 4     PHQ 4/20/2021   PHQ-9 Total Score 0   Q9: Thoughts of better off dead/self-harm past 2 weeks Not at all     Last PHQ-9 4/20/2021   1.  Little interest or pleasure in doing things 0   2.  Feeling down, depressed, or hopeless 0   3.  Trouble falling or staying asleep, or sleeping too much 0   4.  Feeling tired or having little energy 0   5.  Poor appetite or overeating 0   6.  Feeling bad about yourself 0   7.  Trouble concentrating 0   8.  Moving slowly or restless 0   Q9: Thoughts of better off dead/self-harm past 2 weeks 0   PHQ-9 Total Score 0     MONTEZ-7  4/20/2021   1. Feeling nervous, anxious, or on edge 1   2. Not  being able to stop or control worrying 0   3. Worrying too much about different things 1   4. Trouble relaxing 1   5. Being so restless that it is hard to sit still 0   6. Becoming easily annoyed or irritable 1   7. Feeling afraid, as if something awful might happen 0   MONTEZ-7 Total Score 4   If you checked any problems, how difficult have they made it for you to do your work, take care of things at home, or get along with other people? Somewhat difficult         Today's PHQ-2 Score:   PHQ-2 ( 1999 Pfizer) 4/17/2021   Q1: Little interest or pleasure in doing things 0   Q2: Feeling down, depressed or hopeless 0   PHQ-2 Score 0   Q1: Little interest or pleasure in doing things Not at all   Q2: Feeling down, depressed or hopeless Not at all   PHQ-2 Score 0       Abuse: Current or Past (Physical, Sexual or Emotional) - No  Do you feel safe in your environment? Yes    Have you ever done Advance Care Planning? (For example, a Health Directive, POLST, or a discussion with a medical provider or your loved ones about your wishes): No, advance care planning information given to patient to review.  Patient plans to discuss their wishes with loved ones or provider.      Social History     Tobacco Use     Smoking status: Never Smoker     Smokeless tobacco: Never Used   Substance Use Topics     Alcohol use: Not Currently     If you drink alcohol do you typically have >3 drinks per day or >7 drinks per week? Not applicable    Alcohol Use 4/17/2021   Prescreen: >3 drinks/day or >7 drinks/week? Not Applicable   Prescreen: >3 drinks/day or >7 drinks/week? -       Reviewed orders with patient.  Reviewed health maintenance and updated orders accordingly - Yes  Lab work is in process  Labs reviewed in EPIC  BP Readings from Last 3 Encounters:   04/20/21 121/81   09/15/20 118/77   08/13/20 125/83    Wt Readings from Last 3 Encounters:   04/20/21 105.8 kg (233 lb 3.2 oz)   09/15/20 95.2 kg (209 lb 12.8 oz)   08/13/20 96.8 kg (213 lb 8 oz)                   Patient Active Problem List   Diagnosis     CARDIOVASCULAR SCREENING; LDL GOAL LESS THAN 160     MONTEZ (generalized anxiety disorder)     History of infertility     Bariatric surgery status     GERD without esophagitis     Postsurgical malabsorption     Obesity (BMI 35.0-39.9) with comorbidity (H)     Past Surgical History:   Procedure Laterality Date     ENT SURGERY      wisdom teeth     LAPAROSCOPIC CHOLECYSTECTOMY N/A 9/4/2018    Procedure: LAPAROSCOPIC CHOLECYSTECTOMY;  LAPAROSCOPIC CHOLECYSTECTOMY ;  Surgeon: Kenneth Jo MD;  [facility]: [facility]     LAPAROSCOPIC GASTRIC SLEEVE N/A 8/7/2017    Procedure: LAPAROSCOPIC GASTRIC SLEEVE;  LAPAROSCOPIC GASTRIC SLEEVE;  Surgeon: Sloan Burnette MD;  Location:  OR     ORTHOPEDIC SURGERY  1991    Left elbow       Social History     Tobacco Use     Smoking status: Never Smoker     Smokeless tobacco: Never Used   Substance Use Topics     Alcohol use: Not Currently     Family History   Problem Relation Age of Onset     Depression/Anxiety Mother         Anxiety     Thyroid Disease Mother         Hypothyroid     Anxiety Disorder Mother      Thyroid Disease Mother         hypothyroid     Obesity Mother      Other Cancer Maternal Grandmother         Hodgkins Lymphoma     Obesity Maternal Grandmother      Prostate Cancer Paternal Grandfather      Anxiety Disorder Brother      Depression/Anxiety Sister         Anxiety     Anxiety Disorder Sister          Current Outpatient Medications   Medication Sig Dispense Refill     Cyanocobalamin (B-12-SL SL) Place 1,000 mcg under the tongue daily        docusate sodium (COLACE) 100 MG capsule Take 100 mg by mouth 2 times daily       escitalopram (LEXAPRO) 10 MG tablet Take 1 tablet (10 mg) by mouth daily 90 tablet 1     Iron-Vitamin C (VITRON-C PO) Take 1 tablet by mouth every morning       Prenatal Multivit-Min-Fe-FA (PRENATAL VITAMINS PO)        VITAMIN D, CHOLECALCIFEROL, PO Take 2,000 Units by mouth daily        Allergies   Allergen Reactions     No Clinical Screening - See Comments Anaphylaxis     Mice droppings       Breast Cancer Screening:    Breast CA Risk Assessment (FHS-7) 4/17/2021   Do you have a family history of breast, colon, or ovarian cancer? No / Unknown         Patient under 40 years of age: Routine Mammogram Screening not recommended.   Pertinent mammograms are reviewed under the imaging tab.    History of abnormal Pap smear: NO - age 30-65 PAP every 5 years with negative HPV co-testing recommended  PAP / HPV Latest Ref Rng & Units 1/31/2018 3/13/2015   PAP - NIL NIL   HPV 16 DNA NEG:Negative Negative Negative   HPV 18 DNA NEG:Negative Negative Negative   OTHER HR HPV NEG:Negative Negative Negative     Reviewed and updated as needed this visit by clinical staff  Tobacco  Allergies  Meds  Problems  Med Hx  Surg Hx  Fam Hx  Soc Hx          Reviewed and updated as needed this visit by Provider  Tobacco  Allergies  Meds  Problems  Med Hx  Surg Hx  Fam Hx         Past Medical History:   Diagnosis Date     MONTEZ (generalized anxiety disorder) 3/28/2016     Gastroesophageal reflux disease      History of infertility 05/10/2017    IVF     Obese       Past Surgical History:   Procedure Laterality Date     ENT SURGERY      wisdom teeth     LAPAROSCOPIC CHOLECYSTECTOMY N/A 9/4/2018    Procedure: LAPAROSCOPIC CHOLECYSTECTOMY;  LAPAROSCOPIC CHOLECYSTECTOMY ;  Surgeon: Kenneth Jo MD;  Location: [facility]     LAPAROSCOPIC GASTRIC SLEEVE N/A 8/7/2017    Procedure: LAPAROSCOPIC GASTRIC SLEEVE;  LAPAROSCOPIC GASTRIC SLEEVE;  Surgeon: Sloan Burnette MD;  Location:  OR     ORTHOPEDIC SURGERY  1991    Left elbow       Review of Systems   Constitutional: Negative for chills and fever.   HENT: Negative for congestion, ear pain, hearing loss and sore throat.    Eyes: Negative for pain and visual disturbance.   Respiratory: Negative for cough and shortness of breath.    Cardiovascular: Negative for  "chest pain, palpitations and peripheral edema.   Gastrointestinal: Negative for abdominal pain, constipation, diarrhea, heartburn, hematochezia and nausea.   Breasts:  Negative for tenderness, breast mass and discharge.   Genitourinary: Negative for dysuria, frequency, genital sores, hematuria, pelvic pain, urgency, vaginal bleeding and vaginal discharge.   Musculoskeletal: Negative for arthralgias, joint swelling and myalgias.   Skin: Negative for rash.   Neurological: Negative for dizziness, weakness, headaches and paresthesias.   Psychiatric/Behavioral: Negative for mood changes. The patient is not nervous/anxious.      CONSTITUTIONAL: NEGATIVE for fever, chills, change in weight  INTEGUMENTARU/SKIN: NEGATIVE for worrisome rashes, moles or lesions  EYES: NEGATIVE for vision changes or irritation  ENT: NEGATIVE for ear, mouth and throat problems  RESP: NEGATIVE for significant cough or SOB  BREAST: NEGATIVE for masses, tenderness or discharge  CV: NEGATIVE for chest pain, palpitations or peripheral edema  GI: NEGATIVE for nausea, abdominal pain, heartburn, or change in bowel habits  : NEGATIVE for unusual urinary or vaginal symptoms. Periods are regular.  MUSCULOSKELETAL: NEGATIVE for significant arthralgias or myalgia  NEURO: NEGATIVE for weakness, dizziness or paresthesias  ENDOCRINE: NEGATIVE for temperature intolerance, skin/hair changes  HEME/ALLERGY/IMMUNE: NEGATIVE for bleeding problems     OBJECTIVE:   /81   Pulse 70   Temp 98.2  F (36.8  C)   Ht 1.689 m (5' 6.5\")   Wt 105.8 kg (233 lb 3.2 oz)   Breastfeeding Yes   BMI 37.08 kg/m    Physical Exam  GENERAL APPEARANCE: healthy, alert and no distress  EYES: Eyes grossly normal to inspection and conjunctivae and sclerae normal  NECK: no adenopathy and trachea midline and normal to palpation  RESP: lungs clear to auscultation - no rales, rhonchi or wheezes  CV: regular rates and rhythm, normal S1 S2, no S3 or S4 and no murmur, click or " "rub  ABDOMEN: soft, non-tender and no rebound or guarding   MS: extremities normal- no gross deformities noted and peripheral pulses normal  SKIN: no suspicious lesions or rashes  NEURO: Normal strength and tone, mentation intact and speech normal  PSYCH: mentation appears normal      Diagnostic Test Results:  Labs reviewed in Epic  No results found for this or any previous visit (from the past 24 hour(s)).    ASSESSMENT/PLAN:   Clarissa was seen today for physical.    Diagnoses and all orders for this visit:    Routine general medical examination at a health care facility  -     Lipid panel reflex to direct LDL Non-fasting  -     Glucose    Generalized anxiety disorder  -     escitalopram (LEXAPRO) 10 MG tablet; Take 1 tablet (10 mg) by mouth daily    The importance of a multi faceted approach in controlling symptoms was reviewed.  The benefits of cognitive behavioral therapy reviewed, benefits of exercise, and stress reduction also discussed.  Do not stop medication suddenly, medication will need to be tapered off.  Slight increased risk of suicide with SSRI group of medications discussed.    -E visit follow up in 6 months     Lipid screening  -     Lipid panel reflex to direct LDL Non-fasting        Patient has been advised of split billing requirements and indicates understanding: Yes  COUNSELING:  Reviewed preventive health counseling, as reflected in patient instructions       Regular exercise       Healthy diet/nutrition       Folic Acid    Estimated body mass index is 37.08 kg/m  as calculated from the following:    Height as of this encounter: 1.689 m (5' 6.5\").    Weight as of this encounter: 105.8 kg (233 lb 3.2 oz).    Weight management plan: Discussed healthy diet and exercise guidelines    She reports that she has never smoked. She has never used smokeless tobacco.      Counseling Resources:  ATP IV Guidelines  Pooled Cohorts Equation Calculator  Breast Cancer Risk Calculator  BRCA-Related Cancer Risk " Assessment: FHS-7 Tool  FRAX Risk Assessment  ICSI Preventive Guidelines  Dietary Guidelines for Americans, 2010  USDA's MyPlate  ASA Prophylaxis  Lung CA Screening    Krista Landaverde MD MPH    [facility]

## 2021-04-20 NOTE — RESULT ENCOUNTER NOTE
Clarissa,     Cholesterol is excellent.  Glucose is normal, you do not have diabetes.     Please do not hesitate to call us at [phone] if you have any questions or concerns.    Thank you,    Krista Landaverde MD MPH

## 2021-04-21 ASSESSMENT — ANXIETY QUESTIONNAIRES: GAD7 TOTAL SCORE: 4

## 2021-09-25 ENCOUNTER — HEALTH MAINTENANCE LETTER (OUTPATIENT)
Age: 36
End: 2021-09-25

## 2021-09-25 DIAGNOSIS — K21.9 GERD WITHOUT ESOPHAGITIS: ICD-10-CM

## 2021-09-27 NOTE — TELEPHONE ENCOUNTER
Prescription approved per [facility] Refill Protocol.  Alisson Nix RN, BSN   [facility]

## 2021-10-19 DIAGNOSIS — F41.1 GENERALIZED ANXIETY DISORDER: ICD-10-CM

## 2021-10-20 RX ORDER — ESCITALOPRAM OXALATE 10 MG/1
TABLET ORAL
Qty: 90 TABLET | Refills: 0 | Status: SHIPPED | OUTPATIENT
Start: 2021-10-20 | End: 2022-01-25

## 2022-01-24 DIAGNOSIS — F41.1 GENERALIZED ANXIETY DISORDER: ICD-10-CM

## 2022-01-25 RX ORDER — ESCITALOPRAM OXALATE 10 MG/1
TABLET ORAL
Qty: 90 TABLET | Refills: 0 | Status: SHIPPED | OUTPATIENT
Start: 2022-01-25 | End: 2022-04-27

## 2022-04-02 ENCOUNTER — MYC REFILL (OUTPATIENT)
Dept: FAMILY MEDICINE | Facility: CLINIC | Age: 37
End: 2022-04-02
Payer: COMMERCIAL

## 2022-04-02 DIAGNOSIS — K21.9 GERD WITHOUT ESOPHAGITIS: ICD-10-CM

## 2022-04-04 NOTE — TELEPHONE ENCOUNTER
Duplicate. Please see refill encounter on 4/2/22.    Vannesa Zheng RN, BSN  [facility]

## 2022-04-28 ASSESSMENT — ENCOUNTER SYMPTOMS
WEAKNESS: 0
FREQUENCY: 0
NERVOUS/ANXIOUS: 0
NAUSEA: 0
DIZZINESS: 0
DYSURIA: 0
MYALGIAS: 0
BREAST MASS: 0
FEVER: 0
HEARTBURN: 0
PARESTHESIAS: 0
ARTHRALGIAS: 0
CONSTIPATION: 0
HEMATOCHEZIA: 0
JOINT SWELLING: 0
ABDOMINAL PAIN: 0
HEADACHES: 0
SORE THROAT: 0
COUGH: 0
PALPITATIONS: 0
DIARRHEA: 0
HEMATURIA: 0
SHORTNESS OF BREATH: 0
CHILLS: 0
EYE PAIN: 0

## 2022-04-29 ENCOUNTER — OFFICE VISIT (OUTPATIENT)
Dept: FAMILY MEDICINE | Facility: CLINIC | Age: 37
End: 2022-04-29
Payer: COMMERCIAL

## 2022-04-29 VITALS
BODY MASS INDEX: 39.6 KG/M2 | DIASTOLIC BLOOD PRESSURE: 82 MMHG | OXYGEN SATURATION: 97 % | HEART RATE: 73 BPM | WEIGHT: 246.4 LBS | HEIGHT: 66 IN | TEMPERATURE: 99.1 F | SYSTOLIC BLOOD PRESSURE: 122 MMHG | RESPIRATION RATE: 18 BRPM

## 2022-04-29 DIAGNOSIS — E66.01 MORBID OBESITY (H): ICD-10-CM

## 2022-04-29 DIAGNOSIS — Z00.00 ROUTINE GENERAL MEDICAL EXAMINATION AT A HEALTH CARE FACILITY: Primary | ICD-10-CM

## 2022-04-29 LAB
CHOLEST SERPL-MCNC: 153 MG/DL
FASTING STATUS PATIENT QL REPORTED: NO
FASTING STATUS PATIENT QL REPORTED: YES
GLUCOSE BLD-MCNC: 92 MG/DL (ref 70–99)
HDLC SERPL-MCNC: 53 MG/DL
LDLC SERPL CALC-MCNC: 82 MG/DL
NONHDLC SERPL-MCNC: 100 MG/DL
TRIGL SERPL-MCNC: 91 MG/DL

## 2022-04-29 PROCEDURE — 36415 COLL VENOUS BLD VENIPUNCTURE: CPT | Performed by: PREVENTIVE MEDICINE

## 2022-04-29 PROCEDURE — 80061 LIPID PANEL: CPT | Performed by: PREVENTIVE MEDICINE

## 2022-04-29 PROCEDURE — 82947 ASSAY GLUCOSE BLOOD QUANT: CPT | Performed by: PREVENTIVE MEDICINE

## 2022-04-29 PROCEDURE — 99395 PREV VISIT EST AGE 18-39: CPT | Performed by: PREVENTIVE MEDICINE

## 2022-04-29 ASSESSMENT — ENCOUNTER SYMPTOMS
CONSTIPATION: 0
ARTHRALGIAS: 0
PARESTHESIAS: 0
WEAKNESS: 0
ABDOMINAL PAIN: 0
COUGH: 0
DYSURIA: 0
HEADACHES: 0
EYE PAIN: 0
HEMATURIA: 0
FREQUENCY: 0
SHORTNESS OF BREATH: 0
CHILLS: 0
NAUSEA: 0
NERVOUS/ANXIOUS: 0
DIARRHEA: 0
HEMATOCHEZIA: 0
DIZZINESS: 0
MYALGIAS: 0
BREAST MASS: 0
PALPITATIONS: 0
JOINT SWELLING: 0
SORE THROAT: 0
HEARTBURN: 0
FEVER: 0

## 2022-04-29 ASSESSMENT — PAIN SCALES - GENERAL: PAINLEVEL: NO PAIN (0)

## 2022-04-29 NOTE — PROGRESS NOTES
SUBJECTIVE:   CC: Clarissa Pedroza is an 36 year old woman who presents for preventive health visit.     Patient has been advised of split billing requirements and indicates understanding: Yes  Healthy Habits:     Getting at least 3 servings of Calcium per day:  Yes    Bi-annual eye exam:  NO    Dental care twice a year:  Yes    Sleep apnea or symptoms of sleep apnea:  None    Diet:  Regular (no restrictions)    Frequency of exercise:  4-5 days/week    Duration of exercise:  30-45 minutes    Taking medications regularly:  Yes    Medication side effects:  None    PHQ-2 Total Score: 0    Additional concerns today:  No      Had stopped Lexapro as planning a pregnancy   Trying to conceive, is on Pre  vitamins     Getting more regular exercise+     Today's PHQ-2 Score:   PHQ-2 (  [employer]) 2022   Q1: Little interest or pleasure in doing things 0   Q2: Feeling down, depressed or hopeless 0   PHQ-2 Score 0   PHQ-2 Total Score (12-17 Years)- Positive if 3 or more points; Administer PHQ-A if positive -   Q1: Little interest or pleasure in doing things -   Q2: Feeling down, depressed or hopeless -   PHQ-2 Score -       Abuse: Current or Past (Physical, Sexual or Emotional) - No  Do you feel safe in your environment? Yes        Social History     Tobacco Use     Smoking status: Never Smoker     Smokeless tobacco: Never Used   Substance Use Topics     Alcohol use: Not Currently     If you drink alcohol do you typically have >3 drinks per day or >7 drinks per week? No    Alcohol Use 2022   Prescreen: >3 drinks/day or >7 drinks/week? Not Applicable   Prescreen: >3 drinks/day or >7 drinks/week? -       Reviewed orders with patient.  Reviewed health maintenance and updated orders accordingly - Yes  Lab work is in process  Labs reviewed in EPIC  BP Readings from Last 3 Encounters:   22 122/82   21 121/81   09/15/20 118/77    Wt Readings from Last 3 Encounters:   22 111.8 kg (246 lb 6.4 oz)    04/20/21 105.8 kg (233 lb 3.2 oz)   09/15/20 95.2 kg (209 lb 12.8 oz)                  Patient Active Problem List   Diagnosis     CARDIOVASCULAR SCREENING; LDL GOAL LESS THAN 160     MONTEZ (generalized anxiety disorder)     History of infertility     Bariatric surgery status     GERD without esophagitis     Postsurgical malabsorption     Obesity (BMI 35.0-39.9) with comorbidity (H)     Past Surgical History:   Procedure Laterality Date     ENT SURGERY      wisdom teeth     LAPAROSCOPIC CHOLECYSTECTOMY N/A 9/4/2018    Procedure: LAPAROSCOPIC CHOLECYSTECTOMY;  LAPAROSCOPIC CHOLECYSTECTOMY ;  Surgeon: Kenneth Jo MD;  Location:  [facility]     LAPAROSCOPIC GASTRIC SLEEVE N/A 8/7/2017    Procedure: LAPAROSCOPIC GASTRIC SLEEVE;  LAPAROSCOPIC GASTRIC SLEEVE;  Surgeon: Sloan Burnette MD;  Location:  [facility]     ORTHOPEDIC SURGERY  1991    Left elbow       Social History     Tobacco Use     Smoking status: Never Smoker     Smokeless tobacco: Never Used   Substance Use Topics     Alcohol use: Not Currently     Family History   Problem Relation Age of Onset     Depression/Anxiety Mother         Anxiety     Thyroid Disease Mother         Hypothyroid     Anxiety Disorder Mother      Thyroid Disease Mother         hypothyroid     Obesity Mother      Other Cancer Maternal Grandmother         Hodgkins Lymphoma     Obesity Maternal Grandmother      Prostate Cancer Paternal Grandfather      Anxiety Disorder Brother      Depression/Anxiety Sister         Anxiety     Anxiety Disorder Sister          Current Outpatient Medications   Medication Sig Dispense Refill     Cyanocobalamin (B-12-SL SL) Place 1,000 mcg under the tongue daily        docusate sodium (COLACE) 100 MG capsule Take 100 mg by mouth 2 times daily       Iron-Vitamin C (VITRON-C PO) Take 1 tablet by mouth every morning       omeprazole (PRILOSEC) 20 MG DR capsule Take 1 capsule (20 mg) by mouth daily 90 capsule 0     Prenatal Multivit-Min-Fe-FA (PRENATAL VITAMINS  PO)        VITAMIN D, CHOLECALCIFEROL, PO Take 2,000 Units by mouth daily       Allergies   Allergen Reactions     No Clinical Screening - See Comments Anaphylaxis     Mice droppings       Breast Cancer Screening:    Breast CA Risk Assessment (FHS-7) 4/17/2021   Do you have a family history of breast, colon, or ovarian cancer? No / Unknown         Patient under 40 years of age: Routine Mammogram Screening not recommended.   Pertinent mammograms are reviewed under the imaging tab.    History of abnormal Pap smear: NO - age 30-65 PAP every 5 years with negative HPV co-testing recommended  PAP / HPV Latest Ref Rng & Units 1/31/2018 3/13/2015   PAP (Historical) - NIL NIL   HPV16 NEG:Negative Negative Negative   HPV18 NEG:Negative Negative Negative   HRHPV NEG:Negative Negative Negative     Reviewed and updated as needed this visit by clinical staff   Tobacco  Allergies  Meds  Problems  Med Hx  Surg Hx  Fam Hx  Soc   Hx          Reviewed and updated as needed this visit by Provider   Tobacco  Allergies  Meds  Problems  Med Hx  Surg Hx  Fam Hx           Past Medical History:   Diagnosis Date     MONTEZ (generalized anxiety disorder) 3/28/2016     Gastroesophageal reflux disease      History of infertility 05/10/2017    IVF     Obese       Past Surgical History:   Procedure Laterality Date     ENT SURGERY      wisdom teeth     LAPAROSCOPIC CHOLECYSTECTOMY N/A 9/4/2018    Procedure: LAPAROSCOPIC CHOLECYSTECTOMY;  LAPAROSCOPIC CHOLECYSTECTOMY ;  Surgeon: Kenneth Jo MD;  [facility]     LAPAROSCOPIC GASTRIC SLEEVE N/A 8/7/2017    Procedure: LAPAROSCOPIC GASTRIC SLEEVE;  LAPAROSCOPIC GASTRIC SLEEVE;  Surgeon: Sloan Burnette MD;  Location:  [facility]     ORTHOPEDIC SURGERY  1991    Left elbow       Review of Systems   Constitutional: Negative for chills and fever.   HENT: Negative for congestion, ear pain, hearing loss and sore throat.    Eyes: Negative for pain and visual disturbance.   Respiratory:  "Negative for cough and shortness of breath.    Cardiovascular: Negative for chest pain, palpitations and peripheral edema.   Gastrointestinal: Negative for abdominal pain, constipation, diarrhea, heartburn, hematochezia and nausea.   Breasts:  Negative for tenderness, breast mass and discharge.   Genitourinary: Negative for dysuria, frequency, genital sores, hematuria, pelvic pain, urgency, vaginal bleeding and vaginal discharge.   Musculoskeletal: Negative for arthralgias, joint swelling and myalgias.   Skin: Negative for rash.   Neurological: Negative for dizziness, weakness, headaches and paresthesias.   Psychiatric/Behavioral: Negative for mood changes. The patient is not nervous/anxious.      OBJECTIVE:   /82 (BP Location: Left arm, Patient Position: Sitting, Cuff Size: Adult Large)   Pulse 73   Temp 99.1  F (37.3  C) (Tympanic)   Resp 18   Ht 1.677 m (5' 6.02\")   Wt 111.8 kg (246 lb 6.4 oz)   SpO2 97%   BMI 39.74 kg/m    Physical Exam  GENERAL APPEARANCE: healthy, alert and no distress  EYES: Eyes grossly normal to inspection and conjunctivae and sclerae normal  HENT: Intact TMs  NECK: no adenopathy and trachea midline and normal to palpation  RESP: lungs clear to auscultation - no rales, rhonchi or wheezes  CV: regular rates and rhythm, normal S1 S2, no S3 or S4 and no murmur, click or rub  ABDOMEN: soft, non-tender and no rebound or guarding   MS: extremities normal- no gross deformities noted and peripheral pulses normal  SKIN: no suspicious lesions or rashes  NEURO: Normal strength and tone, mentation intact and speech normal  PSYCH: mentation appears normal      Diagnostic Test Results:  Labs reviewed in Epic  No results found for this or any previous visit (from the past 24 hour(s)).    ASSESSMENT/PLAN:   Clarissa was seen today for physical.    Diagnoses and all orders for this visit:    Routine general medical examination at a health care facility  -     Preventive guidelines reviewed   -    " " Lipid panel reflex to direct LDL Non-fasting  -     Glucose    Morbid obesity (H)  Wt Readings from Last 2 Encounters:   04/29/22 111.8 kg (246 lb 6.4 oz)   04/20/21 105.8 kg (233 lb 3.2 oz)     -weight has increased from last check but is back to regular exercise now     Other orders  -     REVIEW OF HEALTH MAINTENANCE PROTOCOL ORDERS      COUNSELING:  Reviewed preventive health counseling, as reflected in patient instructions       Regular exercise    Estimated body mass index is 39.74 kg/m  as calculated from the following:    Height as of this encounter: 1.677 m (5' 6.02\").    Weight as of this encounter: 111.8 kg (246 lb 6.4 oz).    Weight management plan: Discussed healthy diet and exercise guidelines    She reports that she has never smoked. She has never used smokeless tobacco.      Counseling Resources:  ATP IV Guidelines  Pooled Cohorts Equation Calculator  Breast Cancer Risk Calculator  BRCA-Related Cancer Risk Assessment: FHS-7 Tool  FRAX Risk Assessment  ICSI Preventive Guidelines  Dietary Guidelines for Americans, 2010  USDA's MyPlate  ASA Prophylaxis  Lung CA Screening    Krista Landaverde MD MPH    [facility]  "

## 2022-04-29 NOTE — RESULT ENCOUNTER NOTE
Clarissa, your test results were within normal limits.  Cholesterol is excellent.  Glucose is normal, you do not have diabetes.     Please do not hesitate to call us at [phone] if you have any questions or concerns.    Thank you,    Krista Ladnaverde MD MPH

## 2022-04-29 NOTE — PATIENT INSTRUCTIONS
At [facility], we strive to deliver an exceptional experience to you, every time we see you. If you receive a survey, please complete it as we do value your feedback.  If you have MyChart, you can expect to receive results automatically within 24 hours of their completion.  Your provider will send a note interpreting your results as well.   If you do not have MyChart, you should receive your results in about a week by mail.    Your care team:                            Family Medicine Internal Medicine   MD Ankur Hogue MD Shantel Branch-Fleming, MD Srinivasa Vaka, MD Katya Belousova, CARI Pineda CNP, MD ([facility]   Rusty Donovan, MD Elif Musa MD Amelia Massimini APRN CNP Kim Thein, APRN CNP Bethany Templen, MD             Clinic hours: Monday - Thursday 7 am-6 pm; Fridays 7 am-5 pm.   Urgent care: Monday - Friday 10 am- 8 pm; Saturday and Sunday 9 am-5 pm.    Clinic: [phone]       [facility]: Monday - Thursday 8 am - 7 pm; Friday 8 am - 6 pm  [facility]: [phone]

## 2022-12-15 ENCOUNTER — E-VISIT (OUTPATIENT)
Dept: FAMILY MEDICINE | Facility: CLINIC | Age: 37
End: 2022-12-15
Payer: COMMERCIAL

## 2022-12-15 DIAGNOSIS — E66.01 MORBID OBESITY (H): Primary | ICD-10-CM

## 2022-12-15 PROCEDURE — 99422 OL DIG E/M SVC 11-20 MIN: CPT | Performed by: PREVENTIVE MEDICINE

## 2022-12-16 RX ORDER — SEMAGLUTIDE 1.34 MG/ML
0.25 INJECTION, SOLUTION SUBCUTANEOUS
Qty: 1.5 ML | Refills: 0 | Status: SHIPPED | OUTPATIENT
Start: 2022-12-16 | End: 2023-01-07

## 2022-12-16 RX ORDER — PEN NEEDLE, DIABETIC 30 GX3/16"
1 NEEDLE, DISPOSABLE MISCELLANEOUS
Qty: 30 EACH | Refills: 1 | Status: SHIPPED | OUTPATIENT
Start: 2022-12-16

## 2022-12-16 RX ORDER — BLOOD PRESSURE TEST KIT
1 KIT MISCELLANEOUS
Qty: 100 EACH | Refills: 1 | Status: SHIPPED | OUTPATIENT
Start: 2022-12-16

## 2022-12-16 NOTE — PATIENT INSTRUCTIONS
Thank you for choosing us for your care. I have placed an order for a prescription so that you can start treatment. View your full visit summary for details by clicking on the link below. Your pharmacist will able to address any questions you may have about the medication.     If you're not feeling better within 5-7 days, please schedule an appointment.  You can schedule an appointment right here in [facility], or call [phone]  If the visit is for the same symptoms as your eVisit, we'll refund the cost of your eVisit if seen within seven days.

## 2022-12-24 DIAGNOSIS — F41.1 GENERALIZED ANXIETY DISORDER: ICD-10-CM

## 2022-12-26 RX ORDER — ESCITALOPRAM OXALATE 10 MG/1
TABLET ORAL
Qty: 90 TABLET | Refills: 0 | OUTPATIENT
Start: 2022-12-26

## 2023-01-03 RX ORDER — ESCITALOPRAM OXALATE 10 MG/1
10 TABLET ORAL DAILY
Qty: 90 TABLET | Refills: 0 | Status: SHIPPED | OUTPATIENT
Start: 2023-01-03 | End: 2023-03-30

## 2023-02-22 ENCOUNTER — OFFICE VISIT (OUTPATIENT)
Dept: OPTOMETRY | Facility: CLINIC | Age: 38
End: 2023-02-22
Payer: COMMERCIAL

## 2023-02-22 DIAGNOSIS — H52.13 MYOPIA OF BOTH EYES: ICD-10-CM

## 2023-02-22 DIAGNOSIS — H52.223 REGULAR ASTIGMATISM OF BOTH EYES: ICD-10-CM

## 2023-02-22 DIAGNOSIS — Z01.00 EXAMINATION OF EYES AND VISION: Primary | ICD-10-CM

## 2023-02-22 PROCEDURE — 92004 COMPRE OPH EXAM NEW PT 1/>: CPT | Performed by: OPTOMETRIST

## 2023-02-22 PROCEDURE — 92015 DETERMINE REFRACTIVE STATE: CPT | Performed by: OPTOMETRIST

## 2023-02-22 ASSESSMENT — CONF VISUAL FIELD
OS_SUPERIOR_TEMPORAL_RESTRICTION: 0
OS_SUPERIOR_NASAL_RESTRICTION: 0
OS_NORMAL: 1
OD_NORMAL: 1
OD_SUPERIOR_NASAL_RESTRICTION: 0
OD_INFERIOR_NASAL_RESTRICTION: 0
OD_INFERIOR_TEMPORAL_RESTRICTION: 0
OS_INFERIOR_TEMPORAL_RESTRICTION: 0
OS_INFERIOR_NASAL_RESTRICTION: 0
OD_SUPERIOR_TEMPORAL_RESTRICTION: 0

## 2023-02-22 ASSESSMENT — REFRACTION_WEARINGRX
OS_CYLINDER: +0.25
OD_AXIS: 030
OS_AXIS: 165
OS_SPHERE: -0.75
OD_SPHERE: -1.00
OD_CYLINDER: +0.50

## 2023-02-22 ASSESSMENT — EXTERNAL EXAM - RIGHT EYE: OD_EXAM: NORMAL

## 2023-02-22 ASSESSMENT — VISUAL ACUITY
OD_SC: 20/20
OS_SC: 20/20
CORRECTION_TYPE: GLASSES
OS_CC: 20/20
OD_CC: 20/20
METHOD: SNELLEN - LINEAR
OD_CC+: -1

## 2023-02-22 ASSESSMENT — KERATOMETRY
OS_K1POWER_DIOPTERS: 41.50
OD_K1POWER_DIOPTERS: 41.50
OS_AXISANGLE2_DEGREES: 65
OS_AXISANGLE_DEGREES: 155
OD_AXISANGLE_DEGREES: 16
OD_K2POWER_DIOPTERS: 41.75
OD_AXISANGLE2_DEGREES: 106
OS_K2POWER_DIOPTERS: 41.75

## 2023-02-22 ASSESSMENT — REFRACTION_MANIFEST
OD_AXIS: 030
OD_SPHERE: -1.00
OS_CYLINDER: +0.25
OS_AXIS: 165
OS_SPHERE: -0.75
METHOD_AUTOREFRACTION: 1
OD_CYLINDER: +0.50

## 2023-02-22 ASSESSMENT — SLIT LAMP EXAM - LIDS
COMMENTS: NORMAL
COMMENTS: NORMAL

## 2023-02-22 ASSESSMENT — CUP TO DISC RATIO
OS_RATIO: 0.25
OD_RATIO: 0.2

## 2023-02-22 ASSESSMENT — EXTERNAL EXAM - LEFT EYE: OS_EXAM: NORMAL

## 2023-02-22 ASSESSMENT — TONOMETRY
IOP_METHOD: TONOPEN
OD_IOP_MMHG: 16
OS_IOP_MMHG: 15

## 2023-02-22 NOTE — PROGRESS NOTES
Chief Complaint   Patient presents with     Annual Eye Exam         Last Eye Exam: 11/27/2019  Dilated Previously: Yes, side effects of dilation explained today    What are you currently using to see?  glasses       Distance Vision Acuity: Satisfied with vision    Near Vision Acuity: Satisfied with vision while reading and using computer unaided    Eye Comfort: good  Do you use eye drops? 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       OBJECTIVE: See Ophthalmology exam    ASSESSMENT:    ICD-10-CM    1. Examination of eyes and vision  Z01.00 REFRACTION      2. Myopia of both eyes  H52.13 EYE EXAM (SIMPLE-NONBILLABLE)      3. Regular astigmatism of both eyes  H52.223 EYE EXAM (SIMPLE-NONBILLABLE)          PLAN:     Patient Instructions   Eyeglass prescription given.    Return in 1 year for a complete eye exam or sooner if needed.    Omer Mahoney, OD

## 2023-02-22 NOTE — LETTER
2/22/2023         RE: Clarissa Pedroza  [address] [place] [id number]        Dear Colleague,    Thank you for referring your patient, Clarissa Pedroza, to the [facility]. Please see a copy of my visit note below.    Chief Complaint   Patient presents with     Annual Eye Exam         Last Eye Exam: 11/27/2019  Dilated Previously: Yes, side effects of dilation explained today    What are you currently using to see?  glasses       Distance Vision Acuity: Satisfied with vision    Near Vision Acuity: Satisfied with vision while reading and using computer unaided    Eye Comfort: good  Do you use eye drops? 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       OBJECTIVE: See Ophthalmology exam    ASSESSMENT:    ICD-10-CM    1. Examination of eyes and vision  Z01.00 REFRACTION      2. Myopia of both eyes  H52.13 EYE EXAM (SIMPLE-NONBILLABLE)      3. Regular astigmatism of both eyes  H52.223 EYE EXAM (SIMPLE-NONBILLABLE)          PLAN:     Patient Instructions   Eyeglass prescription given.    Return in 1 year for a complete eye exam or sooner if needed.    Omer Mahoney, BILL           Again, thank you for allowing me to participate in the care of your patient.        Sincerely,        Omer Mahoney, OD

## 2023-02-22 NOTE — PATIENT INSTRUCTIONS
Eyeglass prescription given.    Return in 1 year for a complete eye exam or sooner if needed.    Omer Mahoney OD    The affects of the dilating drops last for 4- 6 hours.  You will be more sensitive to light and vision will be blurry up close.  Do not drive if you do not feel comfortable.  Mydriatic sunglasses were given if needed.      Optometry Providers       Clinic Locations                                 Telephone Number   Dr. Britney Vraela    [place]   [facility]  [place] [phone]     [place] [facility] Hours:                [place] [facility] Hours:       [place] [facility] Hours:   [address] [facility] [address]   [address] [facility]     [address] [place] [address]   [place] [address]    [place] [address]  Phone: [phone]                    Phone: [phone]     Phone: [phone]                      Monday 8:00-6:00                          Monday 8:00-6:00                          Monday 8:00-6:00              Tuesday 8:00-6:00                          Tuesday 8:00-6:00                          Tuesday 8:00-6:00              Wednesday 8:00-6:00                  Wednesday 8:00-6:00                   Wednesday 8:00-6:00      Thursday 8:00-6:00                        Thursday 8:00-6:00                         Thursday 8:00-6:00            Friday 8:00-5:00                              Friday 8:00-5:00                              Friday 8:00-5:00    [facility] Hours:   [address] [facility] [place] [address]  [phone]    Monday 9:00-6:00  Tuesday 9:00-6:00  Wednesday 9:00-6:00  Thursday 9:00-6:00  Friday 9:00-5:00  Please log on to [email or web address] to order your contact lenses.  The link is found on the [facility] page.  As always, Thank you for trusting us with your health care needs!       Detail Level: Zone

## 2023-02-27 ENCOUNTER — E-VISIT (OUTPATIENT)
Dept: FAMILY MEDICINE | Facility: CLINIC | Age: 38
End: 2023-02-27
Payer: COMMERCIAL

## 2023-02-27 DIAGNOSIS — E66.01 MORBID OBESITY (H): Primary | ICD-10-CM

## 2023-02-27 PROCEDURE — 99421 OL DIG E/M SVC 5-10 MIN: CPT | Performed by: PREVENTIVE MEDICINE

## 2023-02-28 RX ORDER — SEMAGLUTIDE 1.34 MG/ML
1 INJECTION, SOLUTION SUBCUTANEOUS
Qty: 3 ML | Refills: 1 | Status: SHIPPED | OUTPATIENT
Start: 2023-02-28 | End: 2023-03-28

## 2023-02-28 NOTE — PATIENT INSTRUCTIONS
Thank you for choosing us for your care. I have placed an order for a prescription so that you can start treatment. View your full visit summary for details by clicking on the link below. Your pharmacist will able to address any questions you may have about the medication.     If you're not feeling better within 5-7 days, please schedule an appointment.  You can schedule an appointment right here in [facility], or call [phone]  If the visit is for the same symptoms as your eVisit, we'll refund the cost of your eVisit if seen within seven days.

## 2023-03-27 ENCOUNTER — E-VISIT (OUTPATIENT)
Dept: FAMILY MEDICINE | Facility: CLINIC | Age: 38
End: 2023-03-27
Payer: COMMERCIAL

## 2023-03-27 ENCOUNTER — TELEPHONE (OUTPATIENT)
Dept: FAMILY MEDICINE | Facility: CLINIC | Age: 38
End: 2023-03-27

## 2023-03-27 DIAGNOSIS — E66.01 MORBID OBESITY (H): Primary | ICD-10-CM

## 2023-03-27 PROCEDURE — 99421 OL DIG E/M SVC 5-10 MIN: CPT | Performed by: PREVENTIVE MEDICINE

## 2023-03-27 NOTE — PATIENT INSTRUCTIONS
Thank you for choosing us for your care. I have placed an order for a prescription so that you can start treatment. View your full visit summary for details by clicking on the link below. Your pharmacist will able to address any questions you may have about the medication.     If you're not feeling better within 5-7 days, please schedule an appointment.  You can schedule an appointment right here in [facility], or call [phone]  If the visit is for the same symptoms as your eVisit, we'll refund the cost of your eVisit if seen within seven days.

## 2023-03-27 NOTE — TELEPHONE ENCOUNTER
Pharmacy calling, they received a prescription for Ozempic with directions to give 1.7mg  States does not come in this dose and cannot dispense using this dose    To provider to advise and send correct dose      Fany GRIFFIN, RN

## 2023-04-26 ASSESSMENT — ENCOUNTER SYMPTOMS
MYALGIAS: 0
COUGH: 0
HEMATOCHEZIA: 0
DYSURIA: 0
EYE PAIN: 0
DIARRHEA: 1
HEARTBURN: 0
SORE THROAT: 0
JOINT SWELLING: 0
HEMATURIA: 0
ARTHRALGIAS: 0
FEVER: 0
WEAKNESS: 0
PARESTHESIAS: 0
FREQUENCY: 0
PALPITATIONS: 0
NERVOUS/ANXIOUS: 0
BREAST MASS: 0
NAUSEA: 0
CONSTIPATION: 0
ABDOMINAL PAIN: 0
SHORTNESS OF BREATH: 0
DIZZINESS: 0
CHILLS: 0
HEADACHES: 0

## 2023-05-03 ENCOUNTER — OFFICE VISIT (OUTPATIENT)
Dept: FAMILY MEDICINE | Facility: CLINIC | Age: 38
End: 2023-05-03
Payer: COMMERCIAL

## 2023-05-03 VITALS
TEMPERATURE: 98.8 F | RESPIRATION RATE: 17 BRPM | HEIGHT: 66 IN | OXYGEN SATURATION: 98 % | DIASTOLIC BLOOD PRESSURE: 77 MMHG | WEIGHT: 224.8 LBS | BODY MASS INDEX: 36.13 KG/M2 | HEART RATE: 81 BPM | SYSTOLIC BLOOD PRESSURE: 109 MMHG

## 2023-05-03 DIAGNOSIS — Z13.6 CARDIOVASCULAR SCREENING; LDL GOAL LESS THAN 160: ICD-10-CM

## 2023-05-03 DIAGNOSIS — Z00.00 ROUTINE GENERAL MEDICAL EXAMINATION AT A HEALTH CARE FACILITY: Primary | ICD-10-CM

## 2023-05-03 DIAGNOSIS — Z12.4 CERVICAL CANCER SCREENING: ICD-10-CM

## 2023-05-03 DIAGNOSIS — E66.01 MORBID OBESITY (H): ICD-10-CM

## 2023-05-03 DIAGNOSIS — Z98.84 BARIATRIC SURGERY STATUS: ICD-10-CM

## 2023-05-03 LAB
ERYTHROCYTE [DISTWIDTH] IN BLOOD BY AUTOMATED COUNT: 11.3 % (ref 10–15)
FOLATE SERPL-MCNC: 37.8 NG/ML (ref 4.6–34.8)
HCT VFR BLD AUTO: 41.7 % (ref 35–47)
HGB BLD-MCNC: 14.7 G/DL (ref 11.7–15.7)
MCH RBC QN AUTO: 33.1 PG (ref 26.5–33)
MCHC RBC AUTO-ENTMCNC: 35.3 G/DL (ref 31.5–36.5)
MCV RBC AUTO: 94 FL (ref 78–100)
PLATELET # BLD AUTO: 237 10E3/UL (ref 150–450)
RBC # BLD AUTO: 4.44 10E6/UL (ref 3.8–5.2)
WBC # BLD AUTO: 9.4 10E3/UL (ref 4–11)

## 2023-05-03 PROCEDURE — 99395 PREV VISIT EST AGE 18-39: CPT | Performed by: NURSE PRACTITIONER

## 2023-05-03 PROCEDURE — 80048 BASIC METABOLIC PNL TOTAL CA: CPT | Performed by: NURSE PRACTITIONER

## 2023-05-03 PROCEDURE — 36415 COLL VENOUS BLD VENIPUNCTURE: CPT | Performed by: NURSE PRACTITIONER

## 2023-05-03 PROCEDURE — 99214 OFFICE O/P EST MOD 30 MIN: CPT | Mod: 25 | Performed by: NURSE PRACTITIONER

## 2023-05-03 PROCEDURE — 84252 ASSAY OF VITAMIN B-2: CPT | Mod: 90 | Performed by: NURSE PRACTITIONER

## 2023-05-03 PROCEDURE — 80061 LIPID PANEL: CPT | Performed by: NURSE PRACTITIONER

## 2023-05-03 PROCEDURE — 99000 SPECIMEN HANDLING OFFICE-LAB: CPT | Performed by: NURSE PRACTITIONER

## 2023-05-03 PROCEDURE — 85027 COMPLETE CBC AUTOMATED: CPT | Performed by: NURSE PRACTITIONER

## 2023-05-03 PROCEDURE — 87624 HPV HI-RISK TYP POOLED RSLT: CPT | Performed by: NURSE PRACTITIONER

## 2023-05-03 PROCEDURE — 82728 ASSAY OF FERRITIN: CPT | Performed by: NURSE PRACTITIONER

## 2023-05-03 PROCEDURE — 82306 VITAMIN D 25 HYDROXY: CPT | Performed by: NURSE PRACTITIONER

## 2023-05-03 PROCEDURE — G0145 SCR C/V CYTO,THINLAYER,RESCR: HCPCS | Performed by: NURSE PRACTITIONER

## 2023-05-03 PROCEDURE — 82746 ASSAY OF FOLIC ACID SERUM: CPT | Performed by: NURSE PRACTITIONER

## 2023-05-03 ASSESSMENT — ENCOUNTER SYMPTOMS
HEMATURIA: 0
JOINT SWELLING: 0
PARESTHESIAS: 0
HEADACHES: 0
BREAST MASS: 0
ABDOMINAL PAIN: 0
HEARTBURN: 0
DIARRHEA: 1
SORE THROAT: 0
HEMATOCHEZIA: 0
CONSTIPATION: 0
WEAKNESS: 0
DIZZINESS: 0
NERVOUS/ANXIOUS: 0
COUGH: 0
NAUSEA: 0
EYE PAIN: 0
FEVER: 0
SHORTNESS OF BREATH: 0
PALPITATIONS: 0
DYSURIA: 0
MYALGIAS: 0
ARTHRALGIAS: 0
FREQUENCY: 0
CHILLS: 0

## 2023-05-03 ASSESSMENT — PAIN SCALES - GENERAL: PAINLEVEL: NO PAIN (0)

## 2023-05-03 NOTE — PROGRESS NOTES
SUBJECTIVE:   CC: Clarissa is an 37 year old who presents for preventive health visit.       5/3/2023     1:41 PM   Additional Questions   Roomed by Bonnie     Patient has been advised of split billing requirements and indicates understanding: Yes  Healthy Habits:     Getting at least 3 servings of Calcium per day:  NO    Bi-annual eye exam:  Yes    Dental care twice a year:  Yes    Sleep apnea or symptoms of sleep apnea:  None    Diet:  Regular (no restrictions)    Frequency of exercise:  4-5 days/week    Duration of exercise:  30-45 minutes    Taking medications regularly:  Yes    Medication side effects:  None    PHQ-2 Total Score: 0    Additional concerns today:  No        Today's PHQ-2 Score:       4/26/2023     9:06 AM   PHQ-2 ( 1999 [employer])   Q1: Little interest or pleasure in doing things 0   Q2: Feeling down, depressed or hopeless 0   PHQ-2 Score 0   Q1: Little interest or pleasure in doing things Not at all    Not at all   Q2: Feeling down, depressed or hopeless Not at all    Not at all   PHQ-2 Score 0    0           Social History     Tobacco Use     Smoking status: Never     Smokeless tobacco: Never   Vaping Use     Vaping status: Never Used   Substance Use Topics     Alcohol use: Not Currently             5/3/2023     1:44 PM   Alcohol Use   Prescreen: >3 drinks/day or >7 drinks/week? Not Applicable     Reviewed orders with patient.  Reviewed health maintenance and updated orders accordingly - Yes  Labs reviewed in EPIC  BP Readings from Last 3 Encounters:   05/03/23 109/77   04/29/22 122/82   04/20/21 121/81    Wt Readings from Last 3 Encounters:   05/03/23 102 kg (224 lb 12.8 oz)   04/29/22 111.8 kg (246 lb 6.4 oz)   04/20/21 105.8 kg (233 lb 3.2 oz)                  Patient Active Problem List   Diagnosis     CARDIOVASCULAR SCREENING; LDL GOAL LESS THAN 160     MONTEZ (generalized anxiety disorder)     History of infertility     Bariatric surgery status     GERD without esophagitis     Postsurgical  malabsorption     Obesity (BMI 35.0-39.9) with comorbidity (H)     Past Surgical History:   Procedure Laterality Date     ENT SURGERY      wisdom teeth     LAPAROSCOPIC CHOLECYSTECTOMY N/A 2018    Procedure: LAPAROSCOPIC CHOLECYSTECTOMY;  LAPAROSCOPIC CHOLECYSTECTOMY ;  Surgeon: Kenneth Jo MD;  Location:  [facility]     LAPAROSCOPIC GASTRIC SLEEVE N/A 2017    Procedure: LAPAROSCOPIC GASTRIC SLEEVE;  LAPAROSCOPIC GASTRIC SLEEVE;  Surgeon: Sloan Burnette MD;  [facility]:  [facility]     ORTHOPEDIC SURGERY  1991    Left elbow       Social History     Tobacco Use     Smoking status: Never     Smokeless tobacco: Never   Vaping Use     Vaping status: Never Used   Substance Use Topics     Alcohol use: Not Currently     Family History   Problem Relation Age of Onset     Depression/Anxiety Mother         Anxiety     Thyroid Disease Mother         Hypothyroid     Anxiety Disorder Mother      Thyroid Disease Mother         hypothyroid     Obesity Mother      Other Cancer Maternal Grandmother         Hodgkins Lymphoma     Obesity Maternal Grandmother      Prostate Cancer Paternal Grandfather      Anxiety Disorder Brother      Depression/Anxiety Sister         Anxiety     Anxiety Disorder Sister            Breast Cancer Screenin/17/2021     7:47 AM   Breast CA Risk Assessment (FHS-7)   Do you have a family history of breast, colon, or ovarian cancer? No / Unknown       click delete button to remove this line now  Patient under 40 years of age: Routine Mammogram Screening not recommended.   Pertinent mammograms are reviewed under the imaging tab.    History of abnormal Pap smear: NO - age 30-65 PAP every 5 years with negative HPV co-testing recommended      Latest Ref Rng & Units 2018    11:30 AM 2018    11:24 AM 3/13/2015    12:05 PM   PAP / HPV   PAP (Historical)   NIL      HPV 16 DNA NEG^Negative Negative    Negative     HPV 18 DNA NEG^Negative Negative    Negative     Other HR HPV NEG^Negative  Negative    Negative       Reviewed and updated as needed this visit by clinical staff   Tobacco  Allergies  Meds              Reviewed and updated as needed this visit by Provider                 Past Medical History:   Diagnosis Date     MONTEZ (generalized anxiety disorder) 3/28/2016     Gastroesophageal reflux disease      History of infertility 05/10/2017    IVF     Obese       Past Surgical History:   Procedure Laterality Date     ENT SURGERY      wisdom teeth     LAPAROSCOPIC CHOLECYSTECTOMY N/A 9/4/2018    Procedure: LAPAROSCOPIC CHOLECYSTECTOMY;  LAPAROSCOPIC CHOLECYSTECTOMY ;  Surgeon: Kenneth Jo MD;  Location: [facility]     LAPAROSCOPIC GASTRIC SLEEVE N/A 8/7/2017    Procedure: LAPAROSCOPIC GASTRIC SLEEVE;  LAPAROSCOPIC GASTRIC SLEEVE;  Surgeon: Sloan Burnette MD;  Location:  [facility]     ORTHOPEDIC SURGERY  1991    Left elbow       Review of Systems   Constitutional: Negative for chills and fever.   HENT: Negative for congestion, ear pain, hearing loss and sore throat.    Eyes: Negative for pain and visual disturbance.   Respiratory: Negative for cough and shortness of breath.    Cardiovascular: Negative for chest pain, palpitations and peripheral edema.   Gastrointestinal: Positive for diarrhea. Negative for abdominal pain, constipation, heartburn, hematochezia and nausea.   Breasts:  Negative for tenderness, breast mass and discharge.   Genitourinary: Negative for dysuria, frequency, genital sores, hematuria, pelvic pain, urgency, vaginal bleeding and vaginal discharge.   Musculoskeletal: Negative for arthralgias, joint swelling and myalgias.   Skin: Negative for rash.   Neurological: Negative for dizziness, weakness, headaches and paresthesias.   Psychiatric/Behavioral: Negative for mood changes. The patient is not nervous/anxious.         OBJECTIVE:   /77 (BP Location: Left arm, Patient Position: Sitting, Cuff Size: Adult Large)   Pulse 81   Temp 98.8  F (37.1  C) (Oral)   Resp 17    " 1.682 m (5' 6.22\")   Wt 102 kg (224 lb 12.8 oz)   LMP 04/27/2023   SpO2 98%   Breastfeeding No   BMI 36.04 kg/m    Physical Exam  GENERAL: healthy, alert and no distress  EYES: Eyes grossly normal to inspection, PERRL and conjunctivae and sclerae normal  HENT: ear canals and TM's normal, nose and mouth without ulcers or lesions  NECK: no adenopathy, no asymmetry, masses, or scars and thyroid normal to palpation  RESP: lungs clear to auscultation - no rales, rhonchi or wheezes  BREAST: normal without masses, tenderness or nipple discharge and no palpable axillary masses or adenopathy  CV: regular rate and rhythm, normal S1 S2, no S3 or S4, no murmur, click or rub, no peripheral edema and peripheral pulses strong  ABDOMEN: soft, nontender, no hepatosplenomegaly, no masses and bowel sounds normal   (female): normal female external genitalia, normal urethral meatus, vaginal mucosa pink, moist, well rugated, and normal cervix/adnexa/uterus without masses or discharge, pap/HIV obtained  MS: no gross musculoskeletal defects noted, no edema  SKIN: no suspicious lesions or rashes  NEURO: Normal strength and tone, mentation intact and speech normal  PSYCH: mentation appears normal, affect normal/bright  LYMPH: no cervical, supraclavicular, axillary, or inguinal adenopathy    Diagnostic Test Results:  Labs reviewed in Epic  Results for orders placed or performed in visit on 05/03/23   Lipid panel reflex to direct LDL Non-fasting     Status: Abnormal   Result Value Ref Range    Cholesterol 161 <200 mg/dL    Triglycerides 112 <150 mg/dL    Direct Measure HDL 46 (L) >=50 mg/dL    LDL Cholesterol Calculated 93 <=100 mg/dL    Non HDL Cholesterol 115 <130 mg/dL    Patient Fasting > 8hrs? No     Narrative    Cholesterol  Desirable:  <200 mg/dL    Triglycerides  Normal:  Less than 150 mg/dL  Borderline High:  150-199 mg/dL  High:  200-499 mg/dL  Very High:  Greater than or equal to 500 mg/dL    Direct Measure HDL  Female:  " Greater than or equal to 50 mg/dL   Male:  Greater than or equal to 40 mg/dL    LDL Cholesterol  Desirable:  <100mg/dL  Above Desirable:  100-129 mg/dL   Borderline High:  130-159 mg/dL   High:  160-189 mg/dL   Very High:  >= 190 mg/dL    Non HDL Cholesterol  Desirable:  130 mg/dL  Above Desirable:  130-159 mg/dL  Borderline High:  160-189 mg/dL  High:  190-219 mg/dL  Very High:  Greater than or equal to 220 mg/dL   Vitamin B2     Status: None   Result Value Ref Range    Vitamin B2 14 1 - 19 mcg/L   Folate     Status: Abnormal   Result Value Ref Range    Folic Acid 37.8 (H) 4.6 - 34.8 ng/mL   Ferritin     Status: Normal   Result Value Ref Range    Ferritin 97 12 - 150 ng/mL   CBC with platelets     Status: Abnormal   Result Value Ref Range    WBC Count 9.4 4.0 - 11.0 10e3/uL    RBC Count 4.44 3.80 - 5.20 10e6/uL    Hemoglobin 14.7 11.7 - 15.7 g/dL    Hematocrit 41.7 35.0 - 47.0 %    MCV 94 78 - 100 fL    MCH 33.1 (H) 26.5 - 33.0 pg    MCHC 35.3 31.5 - 36.5 g/dL    RDW 11.3 10.0 - 15.0 %    Platelet Count 237 150 - 450 10e3/uL   Vitamin D Deficiency     Status: Normal   Result Value Ref Range    Vitamin D, Total (25-Hydroxy) 72 20 - 75 ug/L    Narrative    Season, race, dietary intake, and treatment affect the concentration of 25-hydroxy-Vitamin D. Values may decrease during winter months and increase during summer months. Values 20-29 ug/L may indicate Vitamin D insufficiency and values <20 ug/L may indicate Vitamin D deficiency.    Vitamin D determination is routinely performed by an immunoassay specific for 25 hydroxyvitamin D3.  If an individual is on vitamin D2(ergocalciferol) supplementation, please specify 25 OH vitamin D2 and D3 level determination by LCMSMS test VITD23.     Basic metabolic panel  (Ca, Cl, CO2, Creat, Gluc, K, Na, BUN)     Status: Normal   Result Value Ref Range    Sodium 137 133 - 144 mmol/L    Potassium 4.3 3.4 - 5.3 mmol/L    Chloride 104 94 - 109 mmol/L    Carbon Dioxide (CO2) 28 20 - 32  mmol/L    Anion Gap 5 3 - 14 mmol/L    Urea Nitrogen 17 7 - 30 mg/dL    Creatinine 0.83 0.52 - 1.04 mg/dL    Calcium 9.5 8.5 - 10.1 mg/dL    Glucose 89 70 - 99 mg/dL    GFR Estimate >90 >60 mL/min/1.73m2   HPV High Risk Types DNA Cervical     Status: None   Result Value Ref Range    Other HR HPV Negative Negative    HPV16 DNA Negative Negative    HPV18 DNA Negative Negative    FINAL DIAGNOSIS       This patient's sample is negative for HPV DNA.        This test was developed and its performance characteristics determined by the [facility]. It has not been cleared or approved by the FDA. The laboratory is regulated under CLIA as qualified to perform high-complexity testing. This test is used for clinical purposes. It should not be regarded as investigational or for research.    METHODOLOGY: The Roche Jen 4800 system uses automated extraction, simultaneous amplification of HPV (L1 region) and beta-globin, followed by real time detection of fluorescent labeled HPV and beta globin using specific oligonucleotide probes. The test specifically identifies types HPV 16 DNA and HPV 18 DNA while concurrently detecting the rest of the high risk types (31, 33, 35, 39, 45, 51, 52, 56, 58, 59, 66 or 68).    COMMENTS: This test is not intended for use as a screening device for woman under age 30 with normal cervical cytology. Results should be correlated with cytologic and histologic findings. Close clinical followup is recommended.       Pap Screen with HPV - recommended age 30 - 65 years     Status: None   Result Value Ref Range    Interpretation        Negative for Intraepithelial Lesion or Malignancy (NILM)    Comment         Papanicolaou Test Limitations:  Cervical cytology is a screening test with limited sensitivity, and regular screening is critical for cancer prevention.  Pap tests are primarily effective for the diagnosis/prevention of squamous cell  "carcinoma, not adenocarcinoma or other cancers.        Specimen Adequacy       Satisfactory for evaluation, endocervical/transformation zone component present    Clinical Information       none      LMP/Menopause Date       4/27/2023      Reflex Testing Yes regardless of result     Previous Abnormal?       No      Performing Labs       The technical component of this testing was completed at [facility]         ASSESSMENT/PLAN:   (Z00.00) Routine general medical examination at a health care facility  (primary encounter diagnosis)  Comment:   Plan:     (Z12.4) Cervical cancer screening  Comment:   Plan: Pap Screen with HPV - recommended age 30 - 65         years, HPV Hold (Lab Only), HPV High Risk Types        DNA Cervical            (E66.01) Morbid obesity (H)  Comment: Benefits of weight loss reviewed in detail, encouraged her to cut back on the carbohydrates in the diet, consume more fruits and vegetables, drink plenty of water, avoid fruit juices, sodas, get 150 min moderate exercise/week.  Recheck weight in 6 months.  Plan: CANCELED: Glucose            (Z98.84) Bariatric surgery status  Comment: checking labs, supplement as indicated  Plan: Vitamin B2, Folate, Ferritin, CBC with         platelets, Vitamin D Deficiency, Basic         metabolic panel  (Ca, Cl, CO2, Creat, Gluc, K,         Na, BUN)            (Z13.6) CARDIOVASCULAR SCREENING; LDL GOAL LESS THAN 160  Comment:   Plan: Lipid panel reflex to direct LDL Non-fasting              Patient has been advised of split billing requirements and indicates understanding: Yes      COUNSELING:  Reviewed preventive health counseling, as reflected in patient instructions      BMI:   Estimated body mass index is 36.04 kg/m  as calculated from the following:    Height as of this encounter: 1.682 m (5' 6.22\").    Weight as of this encounter: 102 kg (224 lb 12.8 oz).   Weight management plan: Discussed healthy " diet and exercise guidelines      She reports that she has never smoked. She has never used smokeless tobacco.      CARI Brunson CNP  [facility]

## 2023-05-04 LAB
ANION GAP SERPL CALCULATED.3IONS-SCNC: 5 MMOL/L (ref 3–14)
BUN SERPL-MCNC: 17 MG/DL (ref 7–30)
CALCIUM SERPL-MCNC: 9.5 MG/DL (ref 8.5–10.1)
CHLORIDE BLD-SCNC: 104 MMOL/L (ref 94–109)
CHOLEST SERPL-MCNC: 161 MG/DL
CO2 SERPL-SCNC: 28 MMOL/L (ref 20–32)
CREAT SERPL-MCNC: 0.83 MG/DL (ref 0.52–1.04)
DEPRECATED CALCIDIOL+CALCIFEROL SERPL-MC: 72 UG/L (ref 20–75)
FASTING STATUS PATIENT QL REPORTED: NO
FERRITIN SERPL-MCNC: 97 NG/ML (ref 12–150)
GFR SERPL CREATININE-BSD FRML MDRD: >90 ML/MIN/1.73M2
GLUCOSE BLD-MCNC: 89 MG/DL (ref 70–99)
HDLC SERPL-MCNC: 46 MG/DL
LDLC SERPL CALC-MCNC: 93 MG/DL
NONHDLC SERPL-MCNC: 115 MG/DL
POTASSIUM BLD-SCNC: 4.3 MMOL/L (ref 3.4–5.3)
SODIUM SERPL-SCNC: 137 MMOL/L (ref 133–144)
TRIGL SERPL-MCNC: 112 MG/DL

## 2023-05-06 LAB — VIT B2 SERPL-MCNC: 14 MCG/L (ref 1–19)

## 2023-05-08 LAB
BKR LAB AP GYN ADEQUACY: NORMAL
BKR LAB AP GYN INTERPRETATION: NORMAL
BKR LAB AP HPV REFLEX: NORMAL
BKR LAB AP LMP: NORMAL
BKR LAB AP PREVIOUS ABNORMAL: NORMAL
PATH REPORT.COMMENTS IMP SPEC: NORMAL
PATH REPORT.COMMENTS IMP SPEC: NORMAL
PATH REPORT.RELEVANT HX SPEC: NORMAL

## 2023-05-09 LAB
HUMAN PAPILLOMA VIRUS 16 DNA: NEGATIVE
HUMAN PAPILLOMA VIRUS 18 DNA: NEGATIVE
HUMAN PAPILLOMA VIRUS FINAL DIAGNOSIS: NORMAL
HUMAN PAPILLOMA VIRUS OTHER HR: NEGATIVE

## 2023-07-09 ENCOUNTER — OFFICE VISIT (OUTPATIENT)
Dept: URGENT CARE | Facility: URGENT CARE | Age: 38
End: 2023-07-09
Payer: COMMERCIAL

## 2023-07-09 VITALS
HEART RATE: 86 BPM | OXYGEN SATURATION: 98 % | DIASTOLIC BLOOD PRESSURE: 75 MMHG | WEIGHT: 213.6 LBS | BODY MASS INDEX: 34.25 KG/M2 | SYSTOLIC BLOOD PRESSURE: 112 MMHG | TEMPERATURE: 99.2 F

## 2023-07-09 DIAGNOSIS — J02.9 SORE THROAT: ICD-10-CM

## 2023-07-09 DIAGNOSIS — J02.0 STREP THROAT: Primary | ICD-10-CM

## 2023-07-09 LAB — DEPRECATED S PYO AG THROAT QL EIA: POSITIVE

## 2023-07-09 PROCEDURE — 87880 STREP A ASSAY W/OPTIC: CPT | Performed by: STUDENT IN AN ORGANIZED HEALTH CARE EDUCATION/TRAINING PROGRAM

## 2023-07-09 PROCEDURE — 99213 OFFICE O/P EST LOW 20 MIN: CPT | Performed by: STUDENT IN AN ORGANIZED HEALTH CARE EDUCATION/TRAINING PROGRAM

## 2023-07-09 RX ORDER — PENICILLIN V POTASSIUM 500 MG/1
500 TABLET, FILM COATED ORAL 2 TIMES DAILY
Qty: 20 TABLET | Refills: 0 | Status: SHIPPED | OUTPATIENT
Start: 2023-07-09 | End: 2023-07-19

## 2023-07-09 NOTE — LETTER
July 9, 2023      Clarissa Pedroza  [address] [place] [id number]        To Whom It May Concern:    Clarissa Pedroza  was seen on 7/9/23 for strep throat. Advised she needs to be on antibiotic for 24 hours before returning to work. Please excuse her from work on 7/10/23.        Sincerely,        CARI Licea CNP

## 2023-07-09 NOTE — PROGRESS NOTES
Assessment & Plan     Strep throat  Work letter to excuse for tomorrow. On antibiotic 24 hours before return to work. Start penicillin today and finish entire course.   - penicillin V (VEETID) 500 MG tablet  Dispense: 20 tablet; Refill: 0    Sore throat  - Streptococcus A Rapid Screen w/Reflex to PCR - Clinic Collect       No follow-ups on file.    CARI Licea [facility] LIANET Romo is a 37 year old female who presents to clinic today for the following health issues:  Chief Complaint   Patient presents with     Pharyngitis     [T  was dx with tonsillitis and he  was treated with antibiotics, pt is c/o of throat pain started Friday would like strep test and tonsillitis      HPI      Review of Systems  Constitutional, HEENT, cardiovascular, pulmonary, gi and gu systems are negative, except as otherwise noted.      Objective    /75 (BP Location: Left arm, Patient Position: Sitting, Cuff Size: Adult Large)   Pulse 86   Temp 99.2  F (37.3  C) (Tympanic)   Wt 96.9 kg (213 lb 9.6 oz)   SpO2 98%   BMI 34.25 kg/m    Physical Exam   GENERAL APPEARANCE: alert and no distress  EYES: Eyes grossly normal to inspection, PERRL and conjunctivae and sclerae normal  HENT: posterior pharynx erythematous, exudates on tonsils  NECK: no adenopathy and no asymmetry, masses, or scars  SKIN: no suspicious lesions or rashes  PSYCH: mentation appears normal and affect normal/bright    Results for orders placed or performed in visit on 07/09/23 (from the past 24 hour(s))   Streptococcus A Rapid Screen w/Reflex to PCR - Clinic Collect    Specimen: Throat; Swab   Result Value Ref Range    Group A Strep antigen Positive (A) Negative

## 2024-04-03 ENCOUNTER — PATIENT OUTREACH (OUTPATIENT)
Dept: CARE COORDINATION | Facility: CLINIC | Age: 39
End: 2024-04-03
Payer: COMMERCIAL

## 2024-04-17 ENCOUNTER — PATIENT OUTREACH (OUTPATIENT)
Dept: CARE COORDINATION | Facility: CLINIC | Age: 39
End: 2024-04-17
Payer: COMMERCIAL

## 2025-01-05 ENCOUNTER — HEALTH MAINTENANCE LETTER (OUTPATIENT)
Age: 40
End: 2025-01-05

## 2025-03-30 ENCOUNTER — HEALTH MAINTENANCE LETTER (OUTPATIENT)
Age: 40
End: 2025-03-30

## (undated) DEVICE — ENDO CANNULA 05MM VERSAONE UNIVERSAL UNVCA5STF

## (undated) DEVICE — ENDO TROCAR OPTICAL 12MM VERSAPORT PLUS W/FIX CAN ONB12STF

## (undated) DEVICE — SU VICRYL 2-0 SH 27" J317H

## (undated) DEVICE — PACK LAP CHOLE SLC15LCFSD

## (undated) DEVICE — ESU CORD MONOPOLAR 10'  E0510

## (undated) DEVICE — PREP CHLORAPREP 26ML TINTED ORANGE  260815

## (undated) DEVICE — SU VICRYL 0 CTX 36" J370H

## (undated) DEVICE — ENDO TROCAR FIRST ENTRY KII FIOS Z-THRD 05X150MM CTF01

## (undated) DEVICE — GOWN IMPERVIOUS SPECIALTY XLG/XLONG 32474

## (undated) DEVICE — ENDO TROCAR OPTICAL 05MM VERSAPORT PLUS W/FIX CAN ONB5STF

## (undated) DEVICE — SOL WATER IRRIG 1000ML BOTTLE 2F7114

## (undated) DEVICE — STPL ENDO HANDLE GIA ULTRA UNIVERSAL STD EGIAUSTND

## (undated) DEVICE — SUCTION IRR STRYKERFLOW II W/TIP 250-070-520

## (undated) DEVICE — SU VICRYL 4-0 PS-2 18" UND J496H

## (undated) DEVICE — SUCTION CANISTER MEDIVAC LINER 3000ML W/LID 65651-530

## (undated) DEVICE — LINEN TOWEL PACK X5 5464

## (undated) DEVICE — GLOVE GAMMEX DERMAPRENE ULTRA SZ 8.5 LF 8517

## (undated) DEVICE — GLOVE PROTEXIS W/NEU-THERA 7.5  2D73TE75

## (undated) DEVICE — DEVICE SUTURE GRASPER TROCAR CLOSURE 14GA PMITCSG

## (undated) DEVICE — ENDO TROCAR SLEEVE KII Z-THREADED 05X100MM CTS02

## (undated) DEVICE — ENDO TROCAR FIRST ENTRY KII FIOS Z-THRD 11X100MM CTF33

## (undated) DEVICE — ESU GROUND PAD UNIVERSAL W/O CORD

## (undated) DEVICE — SU VICRYL 0 UR-6 27" J603H

## (undated) DEVICE — ESU HOLDER LAP INST DISP PURPLE LONG 330MM H-PRO-330

## (undated) DEVICE — SOL NACL 0.9% INJ 1000ML BAG 2B1324X

## (undated) DEVICE — STPL ENDO RELOAD ARTICULATING 60MM MED EGIATRS60AMT

## (undated) DEVICE — ENDO TROCAR OPTICAL ACCESS KII Z-THRD 15X100MM C0R37

## (undated) DEVICE — APPLICATOR COTTON TIP 6"X2 STERILE LF 6012

## (undated) DEVICE — ESU LIGASURE LAPAROSCOPIC BLUNT TIP SEALER 5MMX37CM LF1637

## (undated) DEVICE — CATH TRAY FOLEY 16FR DRAINAGE BAG STATLOCK 899916

## (undated) DEVICE — ENDO POUCH UNIV RETRIEVAL SYSTEM INZII 10MM CD001

## (undated) DEVICE — SYR 01ML 27GA 0.5" NDL TBC 309623

## (undated) DEVICE — DRSG STERI STRIP 1/2X4" R1547

## (undated) DEVICE — STPL ENDO RELOAD ARTICULATING 60MM X-TRA THICK EGIATRS60AXT

## (undated) DEVICE — SU VICRYL 0 TIE 12X18" J906G

## (undated) DEVICE — SURGICEL HEMOSTAT 3X4" NUKNIT 1943

## (undated) DEVICE — DRSG BANDAID 3/4X3"

## (undated) DEVICE — ENDO TROCAR FIRST ENTRY KII FIOS Z-THRD 05X100MM CTF03

## (undated) DEVICE — CLIP APPLIER ENDO 5MM M/L LIGAMAX EL5ML

## (undated) DEVICE — BNDG ABDOMINAL BINDER 9X62-84" 79-89210

## (undated) DEVICE — GLOVE PROTEXIS BLUE W/NEU-THERA 7.5  2D73EB75

## (undated) RX ORDER — NEOSTIGMINE METHYLSULFATE 1 MG/ML
VIAL (ML) INJECTION
Status: DISPENSED
Start: 2018-09-04

## (undated) RX ORDER — ONDANSETRON 2 MG/ML
INJECTION INTRAMUSCULAR; INTRAVENOUS
Status: DISPENSED
Start: 2018-09-04

## (undated) RX ORDER — FENTANYL CITRATE 50 UG/ML
INJECTION, SOLUTION INTRAMUSCULAR; INTRAVENOUS
Status: DISPENSED
Start: 2017-08-07

## (undated) RX ORDER — ONDANSETRON 2 MG/ML
INJECTION INTRAMUSCULAR; INTRAVENOUS
Status: DISPENSED
Start: 2017-08-07

## (undated) RX ORDER — HEPARIN SODIUM 5000 [USP'U]/.5ML
INJECTION, SOLUTION INTRAVENOUS; SUBCUTANEOUS
Status: DISPENSED
Start: 2017-08-07

## (undated) RX ORDER — CEFAZOLIN SODIUM 1 G/3ML
INJECTION, POWDER, FOR SOLUTION INTRAMUSCULAR; INTRAVENOUS
Status: DISPENSED
Start: 2017-08-07

## (undated) RX ORDER — FENTANYL CITRATE 50 UG/ML
INJECTION, SOLUTION INTRAMUSCULAR; INTRAVENOUS
Status: DISPENSED
Start: 2018-09-04

## (undated) RX ORDER — LIDOCAINE HYDROCHLORIDE 20 MG/ML
INJECTION, SOLUTION EPIDURAL; INFILTRATION; INTRACAUDAL; PERINEURAL
Status: DISPENSED
Start: 2018-09-04

## (undated) RX ORDER — VECURONIUM BROMIDE 1 MG/ML
INJECTION, POWDER, LYOPHILIZED, FOR SOLUTION INTRAVENOUS
Status: DISPENSED
Start: 2018-09-04

## (undated) RX ORDER — PROPOFOL 10 MG/ML
INJECTION, EMULSION INTRAVENOUS
Status: DISPENSED
Start: 2018-09-04

## (undated) RX ORDER — CEFAZOLIN SODIUM 1 G/50ML
SOLUTION INTRAVENOUS
Status: DISPENSED
Start: 2017-08-07

## (undated) RX ORDER — BUPIVACAINE HYDROCHLORIDE 2.5 MG/ML
INJECTION, SOLUTION EPIDURAL; INFILTRATION; INTRACAUDAL
Status: DISPENSED
Start: 2017-08-07

## (undated) RX ORDER — HYDROMORPHONE HYDROCHLORIDE 1 MG/ML
INJECTION, SOLUTION INTRAMUSCULAR; INTRAVENOUS; SUBCUTANEOUS
Status: DISPENSED
Start: 2017-08-07

## (undated) RX ORDER — GLYCOPYRROLATE 0.2 MG/ML
INJECTION, SOLUTION INTRAMUSCULAR; INTRAVENOUS
Status: DISPENSED
Start: 2018-09-04

## (undated) RX ORDER — DEXAMETHASONE SODIUM PHOSPHATE 4 MG/ML
INJECTION, SOLUTION INTRA-ARTICULAR; INTRALESIONAL; INTRAMUSCULAR; INTRAVENOUS; SOFT TISSUE
Status: DISPENSED
Start: 2017-08-07

## (undated) RX ORDER — KETOROLAC TROMETHAMINE 30 MG/ML
INJECTION, SOLUTION INTRAMUSCULAR; INTRAVENOUS
Status: DISPENSED
Start: 2017-08-07

## (undated) RX ORDER — HYDROMORPHONE HYDROCHLORIDE 1 MG/ML
INJECTION, SOLUTION INTRAMUSCULAR; INTRAVENOUS; SUBCUTANEOUS
Status: DISPENSED
Start: 2018-09-04

## (undated) RX ORDER — OXYCODONE HYDROCHLORIDE 5 MG/1
TABLET ORAL
Status: DISPENSED
Start: 2018-09-04

## (undated) RX ORDER — DEXAMETHASONE SODIUM PHOSPHATE 4 MG/ML
INJECTION, SOLUTION INTRA-ARTICULAR; INTRALESIONAL; INTRAMUSCULAR; INTRAVENOUS; SOFT TISSUE
Status: DISPENSED
Start: 2018-09-04

## (undated) RX ORDER — CEFAZOLIN SODIUM 2 G/100ML
INJECTION, SOLUTION INTRAVENOUS
Status: DISPENSED
Start: 2018-09-04

## (undated) RX ORDER — EPINEPHRINE 1 MG/ML
INJECTION INTRAMUSCULAR; INTRAVENOUS; SUBCUTANEOUS
Status: DISPENSED
Start: 2017-08-07